# Patient Record
Sex: MALE | Race: WHITE | NOT HISPANIC OR LATINO | URBAN - METROPOLITAN AREA
[De-identification: names, ages, dates, MRNs, and addresses within clinical notes are randomized per-mention and may not be internally consistent; named-entity substitution may affect disease eponyms.]

---

## 2018-09-12 ENCOUNTER — EMERGENCY (EMERGENCY)
Facility: HOSPITAL | Age: 63
LOS: 1 days | Discharge: HOME | End: 2018-09-12
Attending: EMERGENCY MEDICINE
Payer: MEDICARE

## 2018-09-12 VITALS
WEIGHT: 179.9 LBS | HEART RATE: 110 BPM | RESPIRATION RATE: 18 BRPM | HEIGHT: 66 IN | SYSTOLIC BLOOD PRESSURE: 149 MMHG | OXYGEN SATURATION: 96 % | DIASTOLIC BLOOD PRESSURE: 81 MMHG | TEMPERATURE: 99 F

## 2018-09-12 LAB
ANION GAP SERPL CALC-SCNC: 14 MMOL/L — SIGNIFICANT CHANGE UP (ref 5–17)
BUN SERPL-MCNC: 18 MG/DL — SIGNIFICANT CHANGE UP (ref 8–20)
CALCIUM SERPL-MCNC: 9 MG/DL — SIGNIFICANT CHANGE UP (ref 8.6–10.2)
CHLORIDE SERPL-SCNC: 94 MMOL/L — LOW (ref 98–107)
CO2 SERPL-SCNC: 23 MMOL/L — SIGNIFICANT CHANGE UP (ref 22–29)
CREAT SERPL-MCNC: 0.88 MG/DL — SIGNIFICANT CHANGE UP (ref 0.5–1.3)
GLUCOSE SERPL-MCNC: 123 MG/DL — HIGH (ref 70–115)
HCT VFR BLD CALC: 41.9 % — LOW (ref 42–52)
HGB BLD-MCNC: 14.1 G/DL — SIGNIFICANT CHANGE UP (ref 14–18)
MCHC RBC-ENTMCNC: 31.4 PG — HIGH (ref 27–31)
MCHC RBC-ENTMCNC: 33.7 G/DL — SIGNIFICANT CHANGE UP (ref 32–36)
MCV RBC AUTO: 93.3 FL — SIGNIFICANT CHANGE UP (ref 80–94)
PLATELET # BLD AUTO: 393 K/UL — SIGNIFICANT CHANGE UP (ref 150–400)
POTASSIUM SERPL-MCNC: 4.4 MMOL/L — SIGNIFICANT CHANGE UP (ref 3.5–5.3)
POTASSIUM SERPL-SCNC: 4.4 MMOL/L — SIGNIFICANT CHANGE UP (ref 3.5–5.3)
RBC # BLD: 4.49 M/UL — LOW (ref 4.6–6.2)
RBC # FLD: 13 % — SIGNIFICANT CHANGE UP (ref 11–15.6)
SODIUM SERPL-SCNC: 131 MMOL/L — LOW (ref 135–145)
VALPROATE SERPL-MCNC: 26.4 UG/ML — LOW (ref 50–100)
WBC # BLD: 11.6 K/UL — HIGH (ref 4.8–10.8)
WBC # FLD AUTO: 11.6 K/UL — HIGH (ref 4.8–10.8)

## 2018-09-12 PROCEDURE — 99291 CRITICAL CARE FIRST HOUR: CPT

## 2018-09-12 RX ORDER — HALOPERIDOL DECANOATE 100 MG/ML
5 INJECTION INTRAMUSCULAR ONCE
Qty: 0 | Refills: 0 | Status: COMPLETED | OUTPATIENT
Start: 2018-09-12 | End: 2018-09-12

## 2018-09-12 RX ORDER — SODIUM CHLORIDE 9 MG/ML
3 INJECTION INTRAMUSCULAR; INTRAVENOUS; SUBCUTANEOUS ONCE
Qty: 0 | Refills: 0 | Status: COMPLETED | OUTPATIENT
Start: 2018-09-12 | End: 2018-09-12

## 2018-09-12 RX ADMIN — HALOPERIDOL DECANOATE 5 MILLIGRAM(S): 100 INJECTION INTRAMUSCULAR at 21:38

## 2018-09-12 RX ADMIN — SODIUM CHLORIDE 3 MILLILITER(S): 9 INJECTION INTRAMUSCULAR; INTRAVENOUS; SUBCUTANEOUS at 22:14

## 2018-09-12 RX ADMIN — Medication 2 MILLIGRAM(S): at 21:38
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
## 2018-09-12 NOTE — ED PROVIDER NOTE - CONSTITUTIONAL, MLM
normal... Well appearing, well nourished, awake, alert, oriented to person, place, time/situation and in no apparent distress. AGITATED EXTREME
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
## 2018-09-12 NOTE — ED PROVIDER NOTE - CRITICAL CARE INDICATION, MLM
patient was critically ill... Patient was critically ill with a high probability of imminent or life threatening deterioration. AGITATED PT WITH EMS AND PD STAT IV IV MED LABS MONITOR AND PULSE OX

## 2018-09-12 NOTE — ED PROVIDER NOTE - CRITICAL CARE PROVIDED
documentation/interpretation of diagnostic studies/consultation with other physicians/35 MINUTES/direct patient care (not related to procedure)
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
## 2018-09-12 NOTE — ED ADULT NURSE NOTE - INTERVENTIONS DEFINITIONS
Stretcher in lowest position, wheels locked, appropriate side rails in place/Non-slip footwear when patient is off stretcher/Physically safe environment: no spills, clutter or unnecessary equipment/Monitor for mental status changes and reorient to person, place, and time/Provide visual cue, wrist band, yellow gown, etc.

## 2018-09-12 NOTE — ED ADULT NURSE NOTE - OBJECTIVE STATEMENT
Pt found in yellow gown, sedated upon assessment.  Pt responds to painful stimuli.  Received phone call from facility saying they wanted pt tranported to [facility] for psych eval following aggressive behavior but swelling was noted to left ankle and EMS said pt would have to be cleared medically first.  Facility still wants psych eval.

## 2018-09-13 VITALS
RESPIRATION RATE: 18 BRPM | SYSTOLIC BLOOD PRESSURE: 130 MMHG | DIASTOLIC BLOOD PRESSURE: 78 MMHG | TEMPERATURE: 98 F | HEART RATE: 80 BPM | OXYGEN SATURATION: 97 %

## 2018-09-13 LAB
AMPHET UR-MCNC: NEGATIVE — SIGNIFICANT CHANGE UP
BARBITURATES UR SCN-MCNC: NEGATIVE — SIGNIFICANT CHANGE UP
BENZODIAZ UR-MCNC: NEGATIVE — SIGNIFICANT CHANGE UP
COCAINE METAB.OTHER UR-MCNC: NEGATIVE — SIGNIFICANT CHANGE UP
METHADONE UR-MCNC: NEGATIVE — SIGNIFICANT CHANGE UP
OPIATES UR-MCNC: NEGATIVE — SIGNIFICANT CHANGE UP
PCP SPEC-MCNC: SIGNIFICANT CHANGE UP
PCP UR-MCNC: NEGATIVE — SIGNIFICANT CHANGE UP
THC UR QL: NEGATIVE — SIGNIFICANT CHANGE UP

## 2018-09-13 PROCEDURE — 85027 COMPLETE CBC AUTOMATED: CPT

## 2018-09-13 PROCEDURE — 80164 ASSAY DIPROPYLACETIC ACD TOT: CPT

## 2018-09-13 PROCEDURE — 80048 BASIC METABOLIC PNL TOTAL CA: CPT

## 2018-09-13 PROCEDURE — 70450 CT HEAD/BRAIN W/O DYE: CPT | Mod: 26

## 2018-09-13 PROCEDURE — 96374 THER/PROPH/DIAG INJ IV PUSH: CPT

## 2018-09-13 PROCEDURE — 99285 EMERGENCY DEPT VISIT HI MDM: CPT | Mod: 25

## 2018-09-13 PROCEDURE — 36415 COLL VENOUS BLD VENIPUNCTURE: CPT

## 2018-09-13 PROCEDURE — 96375 TX/PRO/DX INJ NEW DRUG ADDON: CPT

## 2018-09-13 PROCEDURE — 70450 CT HEAD/BRAIN W/O DYE: CPT

## 2018-09-13 PROCEDURE — 80307 DRUG TEST PRSMV CHEM ANLYZR: CPT

## 2018-09-13 RX ORDER — SODIUM CHLORIDE 9 MG/ML
999 INJECTION INTRAMUSCULAR; INTRAVENOUS; SUBCUTANEOUS ONCE
Qty: 0 | Refills: 0 | Status: COMPLETED | OUTPATIENT
Start: 2018-09-13 | End: 2018-09-13

## 2018-09-13 RX ADMIN — SODIUM CHLORIDE 999 MILLILITER(S): 9 INJECTION INTRAMUSCULAR; INTRAVENOUS; SUBCUTANEOUS at 05:31

## 2018-09-13 NOTE — ED ADULT NURSE REASSESSMENT NOTE - NS ED NURSE REASSESS COMMENT FT1
Pt reassessed.  Pt lethargic from sedation.  currently responds to verbal stimuli.  Denies physical complaints will continue to monitor.

## 2019-04-04 ENCOUNTER — EMERGENCY (EMERGENCY)
Facility: HOSPITAL | Age: 64
LOS: 1 days | Discharge: OTHER INSTITUTION | End: 2019-04-04
Attending: EMERGENCY MEDICINE
Payer: MEDICARE

## 2019-04-04 VITALS
SYSTOLIC BLOOD PRESSURE: 147 MMHG | DIASTOLIC BLOOD PRESSURE: 86 MMHG | OXYGEN SATURATION: 98 % | RESPIRATION RATE: 18 BRPM | WEIGHT: 145.06 LBS | HEIGHT: 64 IN | HEART RATE: 85 BPM | TEMPERATURE: 99 F

## 2019-04-04 PROCEDURE — 99284 EMERGENCY DEPT VISIT MOD MDM: CPT | Mod: 25

## 2019-04-04 RX ORDER — OLANZAPINE 15 MG/1
10 TABLET, FILM COATED ORAL ONCE
Qty: 0 | Refills: 0 | Status: COMPLETED | OUTPATIENT
Start: 2019-04-04 | End: 2019-04-04

## 2019-04-04 RX ADMIN — OLANZAPINE 10 MILLIGRAM(S): 15 TABLET, FILM COATED ORAL at 22:23

## 2019-04-04 RX ADMIN — Medication 2 MILLIGRAM(S): at 23:11
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
## 2019-04-04 NOTE — ED ADULT NURSE NOTE - NSIMPLEMENTINTERV_GEN_ALL_ED
Implemented All Fall with Harm Risk Interventions:  Wheeler to call system. Call bell, personal items and telephone within reach. Instruct patient to call for assistance. Room bathroom lighting operational. Non-slip footwear when patient is off stretcher. Physically safe environment: no spills, clutter or unnecessary equipment. Stretcher in lowest position, wheels locked, appropriate side rails in place. Provide visual cue, wrist band, yellow gown, etc. Monitor gait and stability. Monitor for mental status changes and reorient to person, place, and time. Review medications for side effects contributing to fall risk. Reinforce activity limits and safety measures with patient and family. Provide visual clues: red socks.

## 2019-04-04 NOTE — ED PROVIDER NOTE - PHYSICAL EXAMINATION
Constitutional : Appears comfortably, talking in full sentences  Head :NC AT , no swelling  Eyes :eomi, no swelling  Mouth :mm moist,  Neck : supple, trachea in midline  Chest :Sky air entry, symm chest expansion, no distress  Heart :S1 S2 distant  Abdomen :abd soft, non tender  Musc/Skel :ext no swelling, no deformity, no spine tenderness, distal pulses present  Neuro  :AAO 3 no focal deficits Constitutional : Appears comfortably, talking in full sentences  Head :NC AT , no swelling  Eyes :eomi, no swelling  Mouth :mm moist,  Neck : supple, trachea in midline  Chest :Sky air entry, symm chest expansion, no distress  Heart :S1 S2 distant  Abdomen :abd soft, non tender  Musc/Skel :ext no swelling, no deformity, no spine tenderness, distal pulses present  Neuro  :AAO 3 no focal deficits    AAO*3, follows commands  motor sky upper and lower ext 5/5  sensory symm and intact  CN 2-12 grossly intact  no ataxia, no nystagmus  finger to nose, symm sky, no pronator drift Constitutional : Appears agitated, is talking in tangental, racing thoughts, difficulty concentrating on conversation,   Head :NC AT , no swelling  Eyes :eomi, no swelling  Mouth :mm moist,  Neck : supple, trachea in midline  Chest :Sky air entry, symm chest expansion, no distress  Heart :S1 S2 distant  Abdomen :abd soft, non tender  Musc/Skel :ext no swelling, no deformity, no spine tenderness, distal pulses present  LLE is larger than right (changes appear chronic)  Neuro  :AAO x 2 no focal deficits, cranial nerves grossly intact, Difficult to assess patient  Level of Consciousness: alert, does not follow commands, CONFUSED  Mood: MANIC, ANXIOUS,   Affect: EXAGGERATED,   Speech: LOUD. PRESSURED, , RAPID,   Risk of Harm: no verbalization of thoughts of harm, no EVIDENCE OF CUTTING, no VERBALIZING WISH TO HARM OTHERS,   Psychiatric Memory: short term memory questionable, SHORT TERM DEFICIT, LONG TERM intact   Behavior: has PSYCHOMOTOR AGITATION, decreased EYE CONTACT  Abnormal Movements: TREMOR, DYSTONIA,   Perception: unable to access for HALLUCINATIONS, DEREALIZATION, DEPERSONALIZATION, TIME DISTORTION

## 2019-04-04 NOTE — ED PROVIDER NOTE - UNABLE TO OBTAIN
HPI limited due to AMS Severe Illness/Injury ROS limited due to AMS HPI limited due to AMS and agitation ROS limited due to AMS and agitation
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
## 2019-04-04 NOTE — ED PROVIDER NOTE - OBJECTIVE STATEMENT
hx: EMS    62 y/o M BIB EMS, presenting with AMS. Patient has not been taking his medication and he is considered for DM according to EMS and out-patient papers. He comes from an out-patient center of [place]. He is talking gibberish and getting agitated when asked about his medication. Pt showed increased agitation at care facility and the crisis hotline was contacted. Patient complains that they took his personal property. hx: EMS    62 y/o M BIB EMS, presenting with AMS. Patient has not been taking his medication and he is considered for DM according to EMS and out-patient papers. He comes from an out-patient center of [place]. He is talking gibberish and getting agitated when asked about his medication. Pt showed increased agitation at care facility and the crisis hotline was contacted. Patient complains that they took his personal property. appears paranoid

## 2019-04-04 NOTE — ED ADULT NURSE NOTE - OBJECTIVE STATEMENT
received pt in yellow gown with belongings secured in . 62yo male sent in from out patient [facility] for not taking meds and aggressive behaviors. pt refusing any rn assessment, medicated as rxd for pt and staff safety. dr gueavra called to bedside for eval upon arrival. pt confused, mumbling, aggressive and un-redirectable. resp spontaneous, even and unlabored

## 2019-04-04 NOTE — ED PROVIDER NOTE - CLINICAL SUMMARY MEDICAL DECISION MAKING FREE TEXT BOX
64 y/o M BIB EMS, with hx of schizophrenia, brought in from outpatient psych facility brought in for acute agitation.  Plan to check labs, EKG, sedation and psych consult needed.

## 2019-04-05 ENCOUNTER — INPATIENT (INPATIENT)
Facility: HOSPITAL | Age: 64
LOS: 75 days | Discharge: PSYCHIATRIC OR SUBSTANCE USE FACILITY | DRG: 885 | End: 2019-06-20
Attending: PSYCHIATRY & NEUROLOGY | Admitting: PSYCHIATRY & NEUROLOGY
Payer: MEDICARE

## 2019-04-05 VITALS
OXYGEN SATURATION: 98 % | HEART RATE: 83 BPM | DIASTOLIC BLOOD PRESSURE: 85 MMHG | TEMPERATURE: 98 F | SYSTOLIC BLOOD PRESSURE: 130 MMHG | RESPIRATION RATE: 16 BRPM

## 2019-04-05 DIAGNOSIS — F20.9 SCHIZOPHRENIA, UNSPECIFIED: ICD-10-CM

## 2019-04-05 LAB
ALBUMIN SERPL ELPH-MCNC: 3.9 G/DL — SIGNIFICANT CHANGE UP (ref 3.3–5.2)
ALP SERPL-CCNC: 82 U/L — SIGNIFICANT CHANGE UP (ref 40–120)
ALT FLD-CCNC: 35 U/L — SIGNIFICANT CHANGE UP
ANION GAP SERPL CALC-SCNC: 12 MMOL/L — SIGNIFICANT CHANGE UP (ref 5–17)
AST SERPL-CCNC: 30 U/L — SIGNIFICANT CHANGE UP
BASOPHILS # BLD AUTO: 0 K/UL — SIGNIFICANT CHANGE UP (ref 0–0.2)
BASOPHILS NFR BLD AUTO: 0.4 % — SIGNIFICANT CHANGE UP (ref 0–2)
BILIRUB SERPL-MCNC: 0.4 MG/DL — SIGNIFICANT CHANGE UP (ref 0.4–2)
BUN SERPL-MCNC: 15 MG/DL — SIGNIFICANT CHANGE UP (ref 8–20)
CALCIUM SERPL-MCNC: 9.3 MG/DL — SIGNIFICANT CHANGE UP (ref 8.6–10.2)
CHLORIDE SERPL-SCNC: 100 MMOL/L — SIGNIFICANT CHANGE UP (ref 98–107)
CO2 SERPL-SCNC: 22 MMOL/L — SIGNIFICANT CHANGE UP (ref 22–29)
CREAT SERPL-MCNC: 0.46 MG/DL — LOW (ref 0.5–1.3)
EOSINOPHIL # BLD AUTO: 0.1 K/UL — SIGNIFICANT CHANGE UP (ref 0–0.5)
EOSINOPHIL NFR BLD AUTO: 0.4 % — SIGNIFICANT CHANGE UP (ref 0–6)
ETHANOL SERPL-MCNC: <10 MG/DL — SIGNIFICANT CHANGE UP
GLUCOSE SERPL-MCNC: 128 MG/DL — HIGH (ref 70–115)
HCT VFR BLD CALC: 46.1 % — SIGNIFICANT CHANGE UP (ref 42–52)
HGB BLD-MCNC: 15.7 G/DL — SIGNIFICANT CHANGE UP (ref 14–18)
LYMPHOCYTES # BLD AUTO: 1.2 K/UL — SIGNIFICANT CHANGE UP (ref 1–4.8)
LYMPHOCYTES # BLD AUTO: 8.8 % — LOW (ref 20–55)
MAGNESIUM SERPL-MCNC: 1.8 MG/DL — SIGNIFICANT CHANGE UP (ref 1.6–2.6)
MCHC RBC-ENTMCNC: 31.7 PG — HIGH (ref 27–31)
MCHC RBC-ENTMCNC: 34.1 G/DL — SIGNIFICANT CHANGE UP (ref 32–36)
MCV RBC AUTO: 93.1 FL — SIGNIFICANT CHANGE UP (ref 80–94)
MONOCYTES # BLD AUTO: 0.7 K/UL — SIGNIFICANT CHANGE UP (ref 0–0.8)
MONOCYTES NFR BLD AUTO: 5 % — SIGNIFICANT CHANGE UP (ref 3–10)
NEUTROPHILS # BLD AUTO: 11.7 K/UL — HIGH (ref 1.8–8)
NEUTROPHILS NFR BLD AUTO: 85.2 % — HIGH (ref 37–73)
PLAT MORPH BLD: NORMAL — SIGNIFICANT CHANGE UP
PLATELET # BLD AUTO: 263 K/UL — SIGNIFICANT CHANGE UP (ref 150–400)
POTASSIUM SERPL-MCNC: 4.6 MMOL/L — SIGNIFICANT CHANGE UP (ref 3.5–5.3)
POTASSIUM SERPL-SCNC: 4.6 MMOL/L — SIGNIFICANT CHANGE UP (ref 3.5–5.3)
PROT SERPL-MCNC: 6.4 G/DL — LOW (ref 6.6–8.7)
RBC # BLD: 4.95 M/UL — SIGNIFICANT CHANGE UP (ref 4.6–6.2)
RBC # FLD: 12.7 % — SIGNIFICANT CHANGE UP (ref 11–15.6)
RBC BLD AUTO: NORMAL — SIGNIFICANT CHANGE UP
SODIUM SERPL-SCNC: 134 MMOL/L — LOW (ref 135–145)
TSH SERPL-MCNC: 0.8 UIU/ML — SIGNIFICANT CHANGE UP (ref 0.27–4.2)
WBC # BLD: 13.7 K/UL — HIGH (ref 4.8–10.8)
WBC # FLD AUTO: 13.7 K/UL — HIGH (ref 4.8–10.8)

## 2019-04-05 PROCEDURE — 99233 SBSQ HOSP IP/OBS HIGH 50: CPT

## 2019-04-05 PROCEDURE — 96374 THER/PROPH/DIAG INJ IV PUSH: CPT

## 2019-04-05 PROCEDURE — 82962 GLUCOSE BLOOD TEST: CPT

## 2019-04-05 PROCEDURE — 80307 DRUG TEST PRSMV CHEM ANLYZR: CPT

## 2019-04-05 PROCEDURE — 99284 EMERGENCY DEPT VISIT MOD MDM: CPT | Mod: 25

## 2019-04-05 PROCEDURE — 96372 THER/PROPH/DIAG INJ SC/IM: CPT | Mod: XU

## 2019-04-05 PROCEDURE — 93010 ELECTROCARDIOGRAM REPORT: CPT

## 2019-04-05 PROCEDURE — 96376 TX/PRO/DX INJ SAME DRUG ADON: CPT

## 2019-04-05 PROCEDURE — 96361 HYDRATE IV INFUSION ADD-ON: CPT

## 2019-04-05 PROCEDURE — 36415 COLL VENOUS BLD VENIPUNCTURE: CPT

## 2019-04-05 PROCEDURE — 83735 ASSAY OF MAGNESIUM: CPT

## 2019-04-05 PROCEDURE — 93005 ELECTROCARDIOGRAM TRACING: CPT

## 2019-04-05 PROCEDURE — 85027 COMPLETE CBC AUTOMATED: CPT

## 2019-04-05 PROCEDURE — 90792 PSYCH DIAG EVAL W/MED SRVCS: CPT | Mod: GT

## 2019-04-05 PROCEDURE — 80053 COMPREHEN METABOLIC PANEL: CPT

## 2019-04-05 PROCEDURE — 84443 ASSAY THYROID STIM HORMONE: CPT

## 2019-04-05 RX ORDER — OLANZAPINE 15 MG/1
5 TABLET, FILM COATED ORAL EVERY 12 HOURS
Qty: 0 | Refills: 0 | Status: DISCONTINUED | OUTPATIENT
Start: 2019-04-05 | End: 2019-04-09

## 2019-04-05 RX ORDER — HALOPERIDOL DECANOATE 100 MG/ML
5 INJECTION INTRAMUSCULAR EVERY 6 HOURS
Qty: 0 | Refills: 0 | Status: COMPLETED | OUTPATIENT
Start: 2019-04-05 | End: 2020-03-03

## 2019-04-05 RX ORDER — BENZTROPINE MESYLATE 1 MG
0.5 TABLET ORAL
Qty: 0 | Refills: 0 | Status: DISCONTINUED | OUTPATIENT
Start: 2019-04-05 | End: 2019-04-06

## 2019-04-05 RX ORDER — QUETIAPINE FUMARATE 200 MG/1
50 TABLET, FILM COATED ORAL AT BEDTIME
Qty: 0 | Refills: 0 | Status: DISCONTINUED | OUTPATIENT
Start: 2019-04-05 | End: 2019-04-06

## 2019-04-05 RX ORDER — HALOPERIDOL DECANOATE 100 MG/ML
5 INJECTION INTRAMUSCULAR EVERY 6 HOURS
Qty: 0 | Refills: 0 | Status: DISCONTINUED | OUTPATIENT
Start: 2019-04-05 | End: 2019-06-20

## 2019-04-05 RX ORDER — SODIUM CHLORIDE 9 MG/ML
999 INJECTION INTRAMUSCULAR; INTRAVENOUS; SUBCUTANEOUS ONCE
Qty: 0 | Refills: 0 | Status: COMPLETED | OUTPATIENT
Start: 2019-04-05 | End: 2019-04-05

## 2019-04-05 RX ADMIN — QUETIAPINE FUMARATE 50 MILLIGRAM(S): 200 TABLET, FILM COATED ORAL at 21:05

## 2019-04-05 RX ADMIN — SODIUM CHLORIDE 249.75 MILLILITER(S): 9 INJECTION INTRAMUSCULAR; INTRAVENOUS; SUBCUTANEOUS at 07:53

## 2019-04-05 RX ADMIN — Medication 2 MILLIGRAM(S): at 06:45

## 2019-04-05 RX ADMIN — Medication 2 MILLIGRAM(S): at 21:05

## 2019-04-05 RX ADMIN — Medication 0.5 MILLIGRAM(S): at 21:05

## 2019-04-05 RX ADMIN — Medication 2 MILLIGRAM(S): at 10:02

## 2019-04-05 RX ADMIN — Medication 2 MILLIGRAM(S): at 21:25

## 2019-04-05 RX ADMIN — SODIUM CHLORIDE 999 MILLILITER(S): 9 INJECTION INTRAMUSCULAR; INTRAVENOUS; SUBCUTANEOUS at 08:30

## 2019-04-05 RX ADMIN — HALOPERIDOL DECANOATE 5 MILLIGRAM(S): 100 INJECTION INTRAMUSCULAR at 21:05

## 2019-04-05 NOTE — PROGRESS NOTE BEHAVIORAL HEALTH - NSBHADMITIPBHPROVFT_PSY_A_CORE
Dr. Moreira called [facility]  forensic ACT team ([phone]) and they report patient is on the forensic ACT team due to the ACT team being full, and the forensic ACT team taking some of the ACT team's patients.  They deny patient has a forensic hx.  Dr. Moreira spoke to Paula Dr. Moreira spoke with [facility] forensic ACT team

## 2019-04-05 NOTE — ED BEHAVIORAL HEALTH ASSESSMENT NOTE - RISK ASSESSMENT
Acute Suicide Risk  (  ) High   (   ) Moderate   (  ) Low   (x  ) Unable to determine   Rationale __patient does not participate in assessment. Some concern due to report of agitation but currently somnolent.________    Elevated Chronic Risk   ( x ) Yes ___________  Details ____hx of schizophrenia_______  (  ) No   ___________    Safety Plan   Details: ____hold in ED.

## 2019-04-05 NOTE — ED ADULT NURSE REASSESSMENT NOTE - NS ED NURSE REASSESS COMMENT FT1
Pt continues on  1 to 1 due to behaviour issues, acting out, calling out, aggressive outbursts, redirection did not work, code gray was called and medication was adm as per order. Pt constantly observed safety measures in place.

## 2019-04-05 NOTE — ED ADULT NURSE REASSESSMENT NOTE - NS ED NURSE REASSESS COMMENT FT1
Pt wondering shields agitated unable to reorient  assisted to chair by md guevara, rn attempted to place patient in wheel chair sudden unsteady gait noted  and outburst of agitation swinging at staff. pt placed on 1:1 for safety security called to bedside

## 2019-04-05 NOTE — ED BEHAVIORAL HEALTH ASSESSMENT NOTE - OTHER
ED staff and Collateral residence staff peers older than stated age tele somnolent patient unable to cooperate unable to comment

## 2019-04-05 NOTE — CHART NOTE - NSCHARTNOTEFT_GEN_A_CORE
Called for code grey as patient is agitated, reported to be violent & spitting over staff, seen at the bedside, lying in bed with security staff around, just received 2 mg Ativan PO & 5 mg of Haldol PO. Order for Ativan 2 mg IM Q 6h PRN for agitation put earlier by psychiatrist was activated, and patient was given 2 mg Ativan IM X1 dose with good response.
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
## 2019-04-05 NOTE — PROGRESS NOTE BEHAVIORAL HEALTH - NSBHADDHXMEDFT_PSY_A_CORE
patient has HTN     Is on metoprolol and lisinopril    Dr. Roberson aware and will follow up with patient.

## 2019-04-05 NOTE — PROGRESS NOTE BEHAVIORAL HEALTH - NSBHFUPINTERVALHXFT_PSY_A_CORE
Met with and evaluated patient. Chart reviewed and case discussed with staff.  Patient is mumbling, and is disorganized with very unsteady gait.  He is irritable and easily frustrated.  His speech is disorganized and he is unable to say whether he would be harmful to self or to others. Constant Observation begun for safety of patient and others.   Unable to assess for AH or VH, but patient has poor eye contact and is flagrantly psychotic. Patient is able to make his basic needs known, eg told staff he needed to walk to the bathroom.  Discussed case briefly with Dr. Roberson who reports he will see patient 4/6

## 2019-04-05 NOTE — PROGRESS NOTE BEHAVIORAL HEALTH - OTHER
older than stated age somewhat cooperative with staff in tenuous control very slowed and unsteady, staff assists with ambulation mumbling poor dentition declines to answer does not reply when asked about suicide and harm to others difficult to assess unable to comment unable to assess
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
## 2019-04-05 NOTE — ED BEHAVIORAL HEALTH NOTE - BEHAVIORAL HEALTH NOTE
Social Work Note: as per MD patient would benefit from inpatient psychiatric treatment. Clinicals were sent to [facility] for review and patient has been accepted at this time. Confirmed with [facility] and accepting MD Dr. Moreira. Nurse to nurse report completed with nurse at [facility]. [facility] called for transport. Patient does not require insurance authorization as he has medicare/medicaid.

## 2019-04-05 NOTE — ED BEHAVIORAL HEALTH ASSESSMENT NOTE - SUMMARY
Patient is a 64 y/o M with reported hx of schizophrenia sent in from [facility] for report of agitation. Patient is a 62 y/o M with reported hx of schizophrenia sent in from [facility] for report of agitation.    presented with agitation per chart, was medicated IM. now unable to participate in Eval. requires reassessment when able to maintain alertness.

## 2019-04-05 NOTE — ED ADULT NURSE REASSESSMENT NOTE - NS ED NURSE REASSESS COMMENT FT1
pt increased agitation, attempting to get OOB with unsteady gait. multiple attempts to redirect. dr guevara called to bedside. 1:1 initiated for pts safety as pt awaiting [facility] eval.

## 2019-04-05 NOTE — PROGRESS NOTE BEHAVIORAL HEALTH - NSBHFUPVIOLRISKFACT_PSY_A_CORE
Lack of insight into violence risk/need for treatment/Irritability/Affective dysregulation/Impulsivity/Noncompliance with treatment

## 2019-04-05 NOTE — ED BEHAVIORAL HEALTH NOTE - BEHAVIORAL HEALTH NOTE
Consult requested by EM Attending Dr. Moreno at 0:38 Telepsychiatry attempted to consult at 1:15 and again at 1:38 but unable to initiate due to inability to contact ED staff. Also, patient was not in a private room with a DX device. ED staff educated to notify Telepsychiatry once private room available.
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
## 2019-04-05 NOTE — ED BEHAVIORAL HEALTH ASSESSMENT NOTE - PSYCHIATRIC ISSUES AND PLAN (INCLUDE STANDING AND PRN MEDICATION)
was already medicated with IM antispychotic by ED, would not add his home seroquel at this time as he is quite sedated and 10mg zyprexa is a greater amount of antipsychotic than his home Rx of 100mg seroquel.

## 2019-04-06 LAB
CHOLEST SERPL-MCNC: 188 MG/DL — SIGNIFICANT CHANGE UP (ref 10–199)
HBA1C BLD-MCNC: 5.7 % — HIGH (ref 4–5.6)
HDLC SERPL-MCNC: 40 MG/DL — SIGNIFICANT CHANGE UP
LIPID PNL WITH DIRECT LDL SERPL: 122 MG/DL — HIGH
TOTAL CHOLESTEROL/HDL RATIO MEASUREMENT: 4.7 RATIO — SIGNIFICANT CHANGE UP (ref 3.4–9.6)
TRIGL SERPL-MCNC: 131 MG/DL — SIGNIFICANT CHANGE UP (ref 10–149)

## 2019-04-06 PROCEDURE — 99231 SBSQ HOSP IP/OBS SF/LOW 25: CPT

## 2019-04-06 RX ORDER — HALOPERIDOL DECANOATE 100 MG/ML
1 INJECTION INTRAMUSCULAR
Qty: 0 | Refills: 0 | Status: DISCONTINUED | OUTPATIENT
Start: 2019-04-06 | End: 2019-04-07

## 2019-04-06 RX ADMIN — HALOPERIDOL DECANOATE 5 MILLIGRAM(S): 100 INJECTION INTRAMUSCULAR at 09:48

## 2019-04-06 RX ADMIN — HALOPERIDOL DECANOATE 1 MILLIGRAM(S): 100 INJECTION INTRAMUSCULAR at 20:52

## 2019-04-06 RX ADMIN — Medication 0.5 MILLIGRAM(S): at 08:55

## 2019-04-06 RX ADMIN — Medication 2 MILLIGRAM(S): at 09:48

## 2019-04-06 NOTE — PROGRESS NOTE BEHAVIORAL HEALTH - NSBHFUPINTERVALHXFT_PSY_A_CORE
Met with and evaluated patient. Chart reviewed and case discussed with staff.  Patient is mumbling, and is disorganized with very unsteady gait.  In bed on 1:1 for safety and disorganization. As per staff/Nursing, he took his breakfast, in the day room, still incoherent, delirious, and mumbling to self. Cogentin discontinued. Haldol low dose started.

## 2019-04-07 LAB — T PALLIDUM AB TITR SER: NEGATIVE — SIGNIFICANT CHANGE UP

## 2019-04-07 PROCEDURE — 99231 SBSQ HOSP IP/OBS SF/LOW 25: CPT

## 2019-04-07 RX ORDER — RISPERIDONE 4 MG/1
0.5 TABLET ORAL
Qty: 0 | Refills: 0 | Status: DISCONTINUED | OUTPATIENT
Start: 2019-04-07 | End: 2019-04-08

## 2019-04-07 RX ADMIN — RISPERIDONE 0.5 MILLIGRAM(S): 4 TABLET ORAL at 21:40

## 2019-04-07 RX ADMIN — Medication 2 MILLIGRAM(S): at 08:50

## 2019-04-07 RX ADMIN — Medication 2 MILLIGRAM(S): at 21:40

## 2019-04-07 RX ADMIN — HALOPERIDOL DECANOATE 5 MILLIGRAM(S): 100 INJECTION INTRAMUSCULAR at 21:40

## 2019-04-07 NOTE — PHYSICAL THERAPY INITIAL EVALUATION ADULT - LEVEL OF INDEPENDENCE: GAIT, REHAB EVAL
unable to perform/Pt. refused to ambulate. Nursing staff says pt. ambulates independently occasionally unstable.

## 2019-04-07 NOTE — PHYSICAL THERAPY INITIAL EVALUATION ADULT - GENERAL OBSERVATIONS, REHAB EVAL
Pt. received in community room asleep on chair. PT unable to understand what pt. was saying. RN reports pt. lives in a group home prior to admission. Did not use any assistive devices prior to admission.

## 2019-04-07 NOTE — PROGRESS NOTE BEHAVIORAL HEALTH - NSBHFUPINTERVALHXFT_PSY_A_CORE
Met with and evaluated patient. Chart reviewed and case discussed with staff.  Patient remains disorganized, less mumbling today AM, refused haldol 1 mg as he does not take. Switched to Risperdal 0.5 mg BID. To f/U in AM tomorrow.

## 2019-04-08 LAB
ANION GAP SERPL CALC-SCNC: 10 MMOL/L — SIGNIFICANT CHANGE UP (ref 5–17)
BUN SERPL-MCNC: 21 MG/DL — SIGNIFICANT CHANGE UP (ref 7–23)
CALCIUM SERPL-MCNC: 9.1 MG/DL — SIGNIFICANT CHANGE UP (ref 8.4–10.5)
CHLORIDE SERPL-SCNC: 104 MMOL/L — SIGNIFICANT CHANGE UP (ref 96–108)
CO2 SERPL-SCNC: 25 MMOL/L — SIGNIFICANT CHANGE UP (ref 22–31)
CREAT SERPL-MCNC: 0.65 MG/DL — SIGNIFICANT CHANGE UP (ref 0.5–1.3)
GLUCOSE SERPL-MCNC: 111 MG/DL — HIGH (ref 70–99)
HCT VFR BLD CALC: 48.1 % — SIGNIFICANT CHANGE UP (ref 39–50)
HGB BLD-MCNC: 16.1 G/DL — SIGNIFICANT CHANGE UP (ref 13–17)
MCHC RBC-ENTMCNC: 31.6 PG — SIGNIFICANT CHANGE UP (ref 27–34)
MCHC RBC-ENTMCNC: 33.5 GM/DL — SIGNIFICANT CHANGE UP (ref 32–36)
MCV RBC AUTO: 94.5 FL — SIGNIFICANT CHANGE UP (ref 80–100)
NRBC # BLD: 0 /100 WBCS — SIGNIFICANT CHANGE UP (ref 0–0)
PLATELET # BLD AUTO: 269 K/UL — SIGNIFICANT CHANGE UP (ref 150–400)
POTASSIUM SERPL-MCNC: 4.5 MMOL/L — SIGNIFICANT CHANGE UP (ref 3.5–5.3)
POTASSIUM SERPL-SCNC: 4.5 MMOL/L — SIGNIFICANT CHANGE UP (ref 3.5–5.3)
RBC # BLD: 5.09 M/UL — SIGNIFICANT CHANGE UP (ref 4.2–5.8)
RBC # FLD: 11.7 % — SIGNIFICANT CHANGE UP (ref 10.3–14.5)
SODIUM SERPL-SCNC: 139 MMOL/L — SIGNIFICANT CHANGE UP (ref 135–145)
WBC # BLD: 8.06 K/UL — SIGNIFICANT CHANGE UP (ref 3.8–10.5)
WBC # FLD AUTO: 8.06 K/UL — SIGNIFICANT CHANGE UP (ref 3.8–10.5)

## 2019-04-08 PROCEDURE — 99232 SBSQ HOSP IP/OBS MODERATE 35: CPT

## 2019-04-08 RX ORDER — RISPERIDONE 4 MG/1
1 TABLET ORAL
Qty: 0 | Refills: 0 | Status: DISCONTINUED | OUTPATIENT
Start: 2019-04-08 | End: 2019-04-12

## 2019-04-08 RX ADMIN — Medication 2 MILLIGRAM(S): at 09:55

## 2019-04-08 RX ADMIN — RISPERIDONE 0.5 MILLIGRAM(S): 4 TABLET ORAL at 09:07

## 2019-04-08 RX ADMIN — RISPERIDONE 1 MILLIGRAM(S): 4 TABLET ORAL at 22:24

## 2019-04-08 NOTE — PROGRESS NOTE BEHAVIORAL HEALTH - OTHER
older than stated age somewhat cooperative with staff, uncooperative at times in tenuous control very slowed and unsteady, staff assists with ambulation mumbling,  but also speaking loudly  at times poor dentition "fine" neutral, gets angry at times more organized, minimal disorganization denies any AH or VH

## 2019-04-08 NOTE — PROGRESS NOTE BEHAVIORAL HEALTH - NSBHFUPINTERVALHXFT_PSY_A_CORE
Met with and evaluated patient. Chart reviewed and case discussed in tx team meeting.   Patient remains disorganized, less mumbling today, speaking in a loud voice at times.  Patient had arrived at the hospital with dirty pants, and the pants have a broken zipper which patient reports he wants fixed.  Staff got pants for patient which were clean and neat, patient threw pants at the staff and stated he only wants his pants he is wearing.  Patient is angry and paranoid.  Patient is masturbating in his room at times. Denies any SI or HI.  Denies any allergy to Haldol. No Rx SE  or sx TD/EPS are noted or reported.  Increase Risperdal to 1mg bid.  Staff continues to try to obtain urine specimen, but patient is resistant to cooperating with this.  Refused to cooperate with PT consult despite staff encouragement

## 2019-04-09 PROCEDURE — 99232 SBSQ HOSP IP/OBS MODERATE 35: CPT

## 2019-04-09 RX ADMIN — RISPERIDONE 1 MILLIGRAM(S): 4 TABLET ORAL at 20:30

## 2019-04-09 RX ADMIN — RISPERIDONE 1 MILLIGRAM(S): 4 TABLET ORAL at 09:16

## 2019-04-09 NOTE — PROGRESS NOTE BEHAVIORAL HEALTH - OTHER
older than stated age poor to limited more cooperative with staff, uncooperative at times in tenuous control more steady, staff assists with ambulation at times poor dentition "ok" neutral more organized, minimal disorganization denies any AH or VH

## 2019-04-09 NOTE — PROGRESS NOTE BEHAVIORAL HEALTH - NSBHFUPINTERVALHXFT_PSY_A_CORE
Met with and evaluated patient. Chart reviewed and case discussed in tx team meeting.  No significant interval events are reported.   Patient is less disorganized,  speaking in a normal voice at times. .  Patient is less paranoid. , but continues delusional Patient discussed Bj Rock's heart surgery, which he saw on the news, then reports that Bj Rock killed Olaf Wall due to an argument. Improved behavioral control today, and less irritable.  Denies any SI or HI.  Again denies  any allergy to Haldol. No Rx SE  or sx TD/EPS are noted or reported.  tolerating increase in Risperdal well.  Increase to 1mg qam and 2mg hs.

## 2019-04-10 PROCEDURE — 99232 SBSQ HOSP IP/OBS MODERATE 35: CPT

## 2019-04-10 RX ORDER — METOPROLOL TARTRATE 50 MG
25 TABLET ORAL DAILY
Qty: 0 | Refills: 0 | Status: DISCONTINUED | OUTPATIENT
Start: 2019-04-10 | End: 2019-05-11

## 2019-04-10 RX ORDER — PALIPERIDONE 1.5 MG/1
234 TABLET, EXTENDED RELEASE ORAL ONCE
Qty: 0 | Refills: 0 | Status: COMPLETED | OUTPATIENT
Start: 2019-04-11 | End: 2019-04-11

## 2019-04-10 RX ORDER — PALIPERIDONE 1.5 MG/1
156 TABLET, EXTENDED RELEASE ORAL ONCE
Qty: 0 | Refills: 0 | Status: COMPLETED | OUTPATIENT
Start: 2019-04-16 | End: 2019-04-16

## 2019-04-10 RX ADMIN — Medication 25 MILLIGRAM(S): at 13:43

## 2019-04-10 RX ADMIN — RISPERIDONE 1 MILLIGRAM(S): 4 TABLET ORAL at 09:14

## 2019-04-10 RX ADMIN — HALOPERIDOL DECANOATE 5 MILLIGRAM(S): 100 INJECTION INTRAMUSCULAR at 23:00

## 2019-04-10 RX ADMIN — RISPERIDONE 1 MILLIGRAM(S): 4 TABLET ORAL at 21:10

## 2019-04-10 RX ADMIN — Medication 2 MILLIGRAM(S): at 23:22

## 2019-04-10 NOTE — PROGRESS NOTE BEHAVIORAL HEALTH - NSBHFUPINTERVALHXFT_PSY_A_CORE
Met with and evaluated patient. Chart reviewed and case discussed in tx team meeting.  No significant interval events are reported except that patient has been in good behavioral control, and constant obs has been DC.  Patient placed on Enhanced Supervision.   Patient is less disorganized,  speaking in a normal voice at times. .. Improved behavioral control today, and less irritable.  Denies any SI or HI.  Again denies  any allergy to Haldol. No Rx SE  or sx TD/EPS are noted or reported.  tolerating increase in Risperdal well.  Increase to 1mg qam and 2mg hs.  To begin Invega Sustenna  to maintain Rx compliance after DC.

## 2019-04-10 NOTE — PROGRESS NOTE BEHAVIORAL HEALTH - OTHER
older than stated age more cooperative with staff, improved response to direction/limits in fair control poor dentition "fine" more organized, minimal disorganization, concrete denies any AH or VH poor to limited

## 2019-04-11 PROCEDURE — 99232 SBSQ HOSP IP/OBS MODERATE 35: CPT

## 2019-04-11 PROCEDURE — 12345: CPT | Mod: NC

## 2019-04-11 RX ORDER — HALOPERIDOL DECANOATE 100 MG/ML
5 INJECTION INTRAMUSCULAR ONCE
Qty: 0 | Refills: 0 | Status: COMPLETED | OUTPATIENT
Start: 2019-04-11 | End: 2019-04-11

## 2019-04-11 RX ADMIN — RISPERIDONE 1 MILLIGRAM(S): 4 TABLET ORAL at 08:45

## 2019-04-11 RX ADMIN — Medication 2 MILLIGRAM(S): at 04:08

## 2019-04-11 RX ADMIN — PALIPERIDONE 234 MILLIGRAM(S): 1.5 TABLET, EXTENDED RELEASE ORAL at 15:00

## 2019-04-11 RX ADMIN — HALOPERIDOL DECANOATE 5 MILLIGRAM(S): 100 INJECTION INTRAMUSCULAR at 04:08

## 2019-04-11 RX ADMIN — RISPERIDONE 1 MILLIGRAM(S): 4 TABLET ORAL at 21:06

## 2019-04-11 RX ADMIN — Medication 25 MILLIGRAM(S): at 08:45

## 2019-04-11 NOTE — PROGRESS NOTE BEHAVIORAL HEALTH - NSBHFUPINTERVALHXFT_PSY_A_CORE
Met with and evaluated patient. Chart reviewed and case discussed in tx team meeting.  No significant interval events are reported. patient has been in good behavioral control and more verbal.   Enhanced Supervision continues to monitor ADL and behavior.   Patient is less disorganized,  speaking in a normal voice and is  less irritable.  Denies any SI or HI. . No Rx SE  or sx TD/EPS are noted or reported.    Invega Sustenna given today.  Patient tolerating it well.

## 2019-04-11 NOTE — PROGRESS NOTE BEHAVIORAL HEALTH - OTHER
more cooperative with staff, improved response to direction/limits in fair control poor dentition "good" more organized, minimal disorganization, concrete denies any AH or VH older than stated age poor to limited

## 2019-04-12 PROCEDURE — 99233 SBSQ HOSP IP/OBS HIGH 50: CPT

## 2019-04-12 RX ORDER — RISPERIDONE 4 MG/1
2 TABLET ORAL
Qty: 0 | Refills: 0 | Status: DISCONTINUED | OUTPATIENT
Start: 2019-04-12 | End: 2019-06-20

## 2019-04-12 RX ORDER — BENZTROPINE MESYLATE 1 MG
1 TABLET ORAL
Qty: 0 | Refills: 0 | Status: DISCONTINUED | OUTPATIENT
Start: 2019-04-12 | End: 2019-05-06

## 2019-04-12 RX ORDER — HALOPERIDOL DECANOATE 100 MG/ML
5 INJECTION INTRAMUSCULAR ONCE
Qty: 0 | Refills: 0 | Status: COMPLETED | OUTPATIENT
Start: 2019-04-12 | End: 2019-04-12

## 2019-04-12 RX ADMIN — HALOPERIDOL DECANOATE 5 MILLIGRAM(S): 100 INJECTION INTRAMUSCULAR at 22:41

## 2019-04-12 RX ADMIN — Medication 25 MILLIGRAM(S): at 08:58

## 2019-04-12 RX ADMIN — Medication 2 MILLIGRAM(S): at 22:41

## 2019-04-12 RX ADMIN — RISPERIDONE 2 MILLIGRAM(S): 4 TABLET ORAL at 22:42

## 2019-04-12 RX ADMIN — RISPERIDONE 1 MILLIGRAM(S): 4 TABLET ORAL at 08:58

## 2019-04-12 RX ADMIN — Medication 2 MILLIGRAM(S): at 10:43

## 2019-04-12 RX ADMIN — HALOPERIDOL DECANOATE 5 MILLIGRAM(S): 100 INJECTION INTRAMUSCULAR at 10:42

## 2019-04-12 RX ADMIN — HALOPERIDOL DECANOATE 5 MILLIGRAM(S): 100 INJECTION INTRAMUSCULAR at 10:45

## 2019-04-12 RX ADMIN — HALOPERIDOL DECANOATE 5 MILLIGRAM(S): 100 INJECTION INTRAMUSCULAR at 14:44

## 2019-04-12 RX ADMIN — Medication 2 MILLIGRAM(S): at 14:44

## 2019-04-12 RX ADMIN — Medication 2 MILLIGRAM(S): at 10:45

## 2019-04-12 NOTE — PROGRESS NOTE BEHAVIORAL HEALTH - NSBHFUPINTERVALHXFT_PSY_A_CORE
Met with and evaluated patient. Chart reviewed and case discussed in tx team meeting.  No significant interval events are reported except patient became upset that his clothes were in the washing machine, and he did not have access to them, so he threw laundry bin at the door to the laundry room, and smashed the glass and broke the lock on the door.  Patient was yelling and screaming and threatening staff.  Jayson Owen called.  Patient escorted to his room, and given Haldol 5mg, and Ativan 2mg IM.  Patient had been offered po Rx, but threw pills on the floor. Constant Observation reinstated for safety of patient and others.  Needed manual hold to receive IM, but was calm after injection.     Patient is more disorganized,  and is  more irritable.  Denies any SI or HI. . No Rx SE  or sx TD/EPS are noted or reported.  Tolerating Rx well, and is calmer at this time. Met with and evaluated patient. Chart reviewed and case discussed in tx team meeting.  No significant interval events are reported except patient became upset that his clothes were in the washing machine, and he did not have access to them, so he threw laundry bin at the door to the laundry room, and smashed the glass and broke the lock on the door.  Patient was yelling and screaming and threatening staff.  Jayson Owen called.  Patient escorted to his room, and given Haldol 5mg, and Ativan 2mg IM.  Patient had been offered po Rx, but threw pills on the floor. Constant Observation reinstated for safety of patient and others.  Needed manual hold to receive IM, but was calm after injection.     Patient is more disorganized,  and is  more irritable.  Denies any SI or HI. . No Rx SE  or sx TD/EPS are noted or reported.  Tolerating Rx well, and is calmer at this time.  Increase Risperdal to 2mg bid.  for Invega Sustenna 4/16 Met with and evaluated patient. Chart reviewed and case discussed in tx team meeting.  No significant interval events are reported except patient became upset that his clothes were in the washing machine, and he did not have access to them, so he threw laundry bin at the door to the laundry room, and smashed the glass and broke the lock on the door.  Patient was yelling and screaming and threatening staff.  Jayson Owen called.  Patient escorted to his room, and given Haldol 5mg, and Ativan 2mg IM.  Patient had been offered po Rx, but threw pills on the floor. Constant Observation reinstated for safety of patient and others.  Needed manual hold to receive IM, but was calm after injection.     Patient is more disorganized,  and is  more irritable.  Denies any SI or HI. . No Rx SE  or sx TD/EPS are noted or reported.  Tolerating Rx well, and is calmer at this time.  Increase Risperdal to 2mg bid.  for Invega Sustenna 4/16 04/12/2019: Patient was seen today AM, he received PRN IM meds for stability as he was aggressive and broke the glass of the laundry room as he was fixated on getting his clothes from the washing machine. He later was re-directed to his room. He is up now, speech is understanding, but remains disorganized, disheveled, malodorous, and needs re-direction and was started again on 1:1 for disorganized behavior. Meds were re-adjusted for stability.

## 2019-04-12 NOTE — PROGRESS NOTE BEHAVIORAL HEALTH - OTHER
older than stated age combative and hostile at times, cooperative at times poor dentition labile and angry , concrete denies any AH or VH

## 2019-04-13 DIAGNOSIS — F20.5 RESIDUAL SCHIZOPHRENIA: ICD-10-CM

## 2019-04-13 RX ORDER — NICOTINE POLACRILEX 2 MG
1 GUM BUCCAL EVERY 4 HOURS
Qty: 0 | Refills: 0 | Status: DISCONTINUED | OUTPATIENT
Start: 2019-04-13 | End: 2019-06-20

## 2019-04-13 RX ADMIN — RISPERIDONE 2 MILLIGRAM(S): 4 TABLET ORAL at 21:10

## 2019-04-13 RX ADMIN — Medication 1 EACH: at 12:55

## 2019-04-13 RX ADMIN — Medication 2 MILLIGRAM(S): at 08:35

## 2019-04-13 RX ADMIN — Medication 2 MILLIGRAM(S): at 23:59

## 2019-04-13 RX ADMIN — RISPERIDONE 2 MILLIGRAM(S): 4 TABLET ORAL at 08:35

## 2019-04-13 RX ADMIN — Medication 25 MILLIGRAM(S): at 08:35

## 2019-04-13 NOTE — PROGRESS NOTE BEHAVIORAL HEALTH - NSBHFUPINTERVALHXFT_PSY_A_CORE
Pt noted to be walking in an out of room, trying to put a flower in a cup of soap. Denied acute issues, including SI/HI. Was hard to understand due to TD and poor dentition but cooperative with writer asking which high school writer went to, using [place] and [place] interchangeably in his answer. Pt noted to be walking in an out of room, trying to put a flower in a cup of soap. Denied acute issues, including SI/HI. Was hard to understand due to TD and poor dentition but cooperative with writer asking which high school writer went to, using [place] and [place] interchangeably in his answer. noted to be volatile, raises voice quickly when his request to go outside for a walk is denied. as per PCA no events since yesterday's pt slept well.

## 2019-04-14 LAB
APPEARANCE UR: CLEAR — SIGNIFICANT CHANGE UP
BILIRUB UR-MCNC: NEGATIVE — SIGNIFICANT CHANGE UP
COLOR SPEC: YELLOW — SIGNIFICANT CHANGE UP
DIFF PNL FLD: ABNORMAL
GLUCOSE UR QL: NEGATIVE MG/DL — SIGNIFICANT CHANGE UP
KETONES UR-MCNC: NEGATIVE — SIGNIFICANT CHANGE UP
LEUKOCYTE ESTERASE UR-ACNC: NEGATIVE — SIGNIFICANT CHANGE UP
NITRITE UR-MCNC: NEGATIVE — SIGNIFICANT CHANGE UP
PH UR: 6.5 — SIGNIFICANT CHANGE UP (ref 5–8)
PROT UR-MCNC: NEGATIVE MG/DL — SIGNIFICANT CHANGE UP
RBC CASTS # UR COMP ASSIST: SIGNIFICANT CHANGE UP /HPF (ref 0–4)
SP GR SPEC: 1.01 — SIGNIFICANT CHANGE UP (ref 1.01–1.02)
UROBILINOGEN FLD QL: NEGATIVE MG/DL — SIGNIFICANT CHANGE UP

## 2019-04-14 RX ORDER — TRAZODONE HCL 50 MG
100 TABLET ORAL AT BEDTIME
Qty: 0 | Refills: 0 | Status: DISCONTINUED | OUTPATIENT
Start: 2019-04-14 | End: 2019-04-26

## 2019-04-14 RX ADMIN — Medication 25 MILLIGRAM(S): at 09:02

## 2019-04-14 RX ADMIN — Medication 100 MILLIGRAM(S): at 20:20

## 2019-04-14 RX ADMIN — RISPERIDONE 2 MILLIGRAM(S): 4 TABLET ORAL at 20:20

## 2019-04-14 RX ADMIN — HALOPERIDOL DECANOATE 5 MILLIGRAM(S): 100 INJECTION INTRAMUSCULAR at 05:29

## 2019-04-14 RX ADMIN — HALOPERIDOL DECANOATE 5 MILLIGRAM(S): 100 INJECTION INTRAMUSCULAR at 20:20

## 2019-04-14 RX ADMIN — Medication 2 MILLIGRAM(S): at 09:02

## 2019-04-14 RX ADMIN — RISPERIDONE 2 MILLIGRAM(S): 4 TABLET ORAL at 09:02

## 2019-04-14 NOTE — PROGRESS NOTE BEHAVIORAL HEALTH - NSBHFUPINTERVALHXFT_PSY_A_CORE
Patient seen, evaluated and chart reviewed. Patient is a 62 y/o M with reported hx of schizophrenia sent in from [facility] for report of agitation.  Pt noted to be disorganized at baseline, having difficulty to focuse on chores at hand, losing his shirt and things in the shirt's pockets. Denied acute issues, including SI/HI. Was hard to understand due to TD and poor dentition. As per PCA no events since yesterday's pt slept well.

## 2019-04-15 PROCEDURE — 99232 SBSQ HOSP IP/OBS MODERATE 35: CPT

## 2019-04-15 RX ADMIN — HALOPERIDOL DECANOATE 5 MILLIGRAM(S): 100 INJECTION INTRAMUSCULAR at 08:17

## 2019-04-15 RX ADMIN — RISPERIDONE 2 MILLIGRAM(S): 4 TABLET ORAL at 22:33

## 2019-04-15 RX ADMIN — Medication 25 MILLIGRAM(S): at 09:06

## 2019-04-15 RX ADMIN — RISPERIDONE 2 MILLIGRAM(S): 4 TABLET ORAL at 09:06

## 2019-04-15 RX ADMIN — Medication 2 MILLIGRAM(S): at 08:18

## 2019-04-15 RX ADMIN — Medication 100 MILLIGRAM(S): at 22:34

## 2019-04-15 NOTE — PROGRESS NOTE BEHAVIORAL HEALTH - OTHER
older than stated age needs staff assist and encouragement tenuous poor dentition, "ok" annoyed concrete denies any AH or VH

## 2019-04-15 NOTE — PROGRESS NOTE BEHAVIORAL HEALTH - NSBHFUPINTERVALHXFT_PSY_A_CORE
Patient seen, evaluated and chart reviewed. Case discussed in tx team meeting. No significant interval events are reported.  Patient continues on constant observation and does have periods of irritable behavior, and needed prn Haldol and Ativan.  He is minimally verbal today, with poor eye contact, and paranoid thoughts.  Behavior continues in tenuous control. Denies any SI or HI, AH or VH.  No Rx SE or sx TD/EPS are noted or reported.

## 2019-04-16 PROCEDURE — 99232 SBSQ HOSP IP/OBS MODERATE 35: CPT

## 2019-04-16 RX ADMIN — HALOPERIDOL DECANOATE 5 MILLIGRAM(S): 100 INJECTION INTRAMUSCULAR at 07:45

## 2019-04-16 RX ADMIN — RISPERIDONE 2 MILLIGRAM(S): 4 TABLET ORAL at 20:24

## 2019-04-16 RX ADMIN — Medication 100 MILLIGRAM(S): at 20:24

## 2019-04-16 RX ADMIN — Medication 2 MILLIGRAM(S): at 07:45

## 2019-04-16 RX ADMIN — Medication 25 MILLIGRAM(S): at 09:40

## 2019-04-16 RX ADMIN — PALIPERIDONE 156 MILLIGRAM(S): 1.5 TABLET, EXTENDED RELEASE ORAL at 15:35

## 2019-04-16 RX ADMIN — HALOPERIDOL DECANOATE 5 MILLIGRAM(S): 100 INJECTION INTRAMUSCULAR at 20:24

## 2019-04-16 RX ADMIN — Medication 2 MILLIGRAM(S): at 21:30

## 2019-04-16 RX ADMIN — RISPERIDONE 2 MILLIGRAM(S): 4 TABLET ORAL at 09:40

## 2019-04-16 NOTE — PROGRESS NOTE BEHAVIORAL HEALTH - NSBHFUPINTERVALHXFT_PSY_A_CORE
Patient seen, evaluated and chart reviewed. Case discussed in tx team meeting. No significant interval events are reported.  Patient was in hallway reading unit schedule and reported he wanted to go to a group.  Went into day area, and sat on the side of the group.  Patient continues on constant observation for safety due to angry, irritable mood and psychotic sx. He continues to be very irritable at times and needed prn Haldol and Ativan.  He is minimally verbal today, with poor eye contact, and paranoid thoughts. He has concerns that others are against him, and that he is here to fix the roof, but reports he will not fix anything today.  Behavior continues in tenuous control. Denies any SI or HI, AH or VH.  No Rx SE or sx TD/EPS are noted or reported.

## 2019-04-16 NOTE — PROGRESS NOTE BEHAVIORAL HEALTH - OTHER
older than stated age needs staff assist and encouragement tenuous loud at times poor dentition, "good" annoyed concrete denies any AH or VH

## 2019-04-17 PROCEDURE — 99232 SBSQ HOSP IP/OBS MODERATE 35: CPT

## 2019-04-17 RX ADMIN — Medication 2 MILLIGRAM(S): at 09:20

## 2019-04-17 RX ADMIN — Medication 25 MILLIGRAM(S): at 09:21

## 2019-04-17 RX ADMIN — RISPERIDONE 2 MILLIGRAM(S): 4 TABLET ORAL at 21:30

## 2019-04-17 RX ADMIN — Medication 2 MILLIGRAM(S): at 16:38

## 2019-04-17 RX ADMIN — Medication 100 MILLIGRAM(S): at 21:30

## 2019-04-17 RX ADMIN — HALOPERIDOL DECANOATE 5 MILLIGRAM(S): 100 INJECTION INTRAMUSCULAR at 09:21

## 2019-04-17 RX ADMIN — RISPERIDONE 2 MILLIGRAM(S): 4 TABLET ORAL at 09:21

## 2019-04-17 NOTE — PROGRESS NOTE BEHAVIORAL HEALTH - NSBHFUPINTERVALHXFT_PSY_A_CORE
Patient seen, evaluated and chart reviewed. Case discussed in tx team meeting. No significant interval events are reported.  Patient has been riding the exercise bike, and reports he likes this.  Patient continues on constant observation for safety due to angry, irritable mood and psychotic sx. He continues to be irritable at times and needed prn Haldol and Ativan, but was calmer today.   He is more verbal today, with poor eye contact, and paranoid thoughts. He has concerns that others are against him, and that he is not sure why.   Behavior continues in tenuous control. Denies any SI or HI, AH or VH.  No Rx SE or sx TD/EPS are noted or reported.

## 2019-04-17 NOTE — PROGRESS NOTE BEHAVIORAL HEALTH - OTHER
denies any AH or VH older than stated age needs staff assist and encouragement tenuous poor dentition, "good" annoyed at times concrete

## 2019-04-18 PROCEDURE — 99231 SBSQ HOSP IP/OBS SF/LOW 25: CPT

## 2019-04-18 RX ORDER — DOCUSATE SODIUM 100 MG
100 CAPSULE ORAL THREE TIMES A DAY
Qty: 0 | Refills: 0 | Status: DISCONTINUED | OUTPATIENT
Start: 2019-04-18 | End: 2019-04-19

## 2019-04-18 RX ORDER — MAGNESIUM HYDROXIDE 400 MG/1
30 TABLET, CHEWABLE ORAL DAILY
Qty: 0 | Refills: 0 | Status: DISCONTINUED | OUTPATIENT
Start: 2019-04-18 | End: 2019-06-20

## 2019-04-18 RX ADMIN — RISPERIDONE 2 MILLIGRAM(S): 4 TABLET ORAL at 21:13

## 2019-04-18 RX ADMIN — Medication 1 MILLIGRAM(S): at 13:56

## 2019-04-18 RX ADMIN — Medication 100 MILLIGRAM(S): at 21:13

## 2019-04-18 RX ADMIN — Medication 25 MILLIGRAM(S): at 08:49

## 2019-04-18 RX ADMIN — Medication 2 MILLIGRAM(S): at 13:55

## 2019-04-18 RX ADMIN — RISPERIDONE 2 MILLIGRAM(S): 4 TABLET ORAL at 08:49

## 2019-04-18 RX ADMIN — MAGNESIUM HYDROXIDE 30 MILLILITER(S): 400 TABLET, CHEWABLE ORAL at 10:52

## 2019-04-18 RX ADMIN — HALOPERIDOL DECANOATE 5 MILLIGRAM(S): 100 INJECTION INTRAMUSCULAR at 13:56

## 2019-04-18 NOTE — PROGRESS NOTE BEHAVIORAL HEALTH - NSBHFUPINTERVALHXFT_PSY_A_CORE
Patient seen, evaluated and chart reviewed. Case discussed in tx team meeting.  Patient continues on constant observation for safety due to angry, irritable mood and psychotic sx. He continues to be irritable at times and needed prn Haldol and Ativan, and is drowsy now.

## 2019-04-18 NOTE — PROGRESS NOTE BEHAVIORAL HEALTH - OTHER
older than stated age needs staff assist and encouragement tenuous - - - poor dentition, "good" annoyed at times concrete denies any AH or VH

## 2019-04-19 PROCEDURE — 99231 SBSQ HOSP IP/OBS SF/LOW 25: CPT

## 2019-04-19 RX ORDER — DOCUSATE SODIUM 100 MG
100 CAPSULE ORAL DAILY
Qty: 0 | Refills: 0 | Status: DISCONTINUED | OUTPATIENT
Start: 2019-04-19 | End: 2019-04-19

## 2019-04-19 RX ORDER — POLYETHYLENE GLYCOL 3350 17 G/17G
17 POWDER, FOR SOLUTION ORAL DAILY
Qty: 0 | Refills: 0 | Status: DISCONTINUED | OUTPATIENT
Start: 2019-04-19 | End: 2019-06-20

## 2019-04-19 RX ORDER — DOCUSATE SODIUM 100 MG
100 CAPSULE ORAL ONCE
Qty: 0 | Refills: 0 | Status: COMPLETED | OUTPATIENT
Start: 2019-04-19 | End: 2019-04-19

## 2019-04-19 RX ORDER — DOCUSATE SODIUM 100 MG
100 CAPSULE ORAL THREE TIMES A DAY
Qty: 0 | Refills: 0 | Status: DISCONTINUED | OUTPATIENT
Start: 2019-04-19 | End: 2019-04-21

## 2019-04-19 RX ADMIN — RISPERIDONE 2 MILLIGRAM(S): 4 TABLET ORAL at 08:37

## 2019-04-19 RX ADMIN — Medication 2 MILLIGRAM(S): at 21:17

## 2019-04-19 RX ADMIN — Medication 1 EACH: at 20:28

## 2019-04-19 RX ADMIN — RISPERIDONE 2 MILLIGRAM(S): 4 TABLET ORAL at 20:28

## 2019-04-19 RX ADMIN — HALOPERIDOL DECANOATE 5 MILLIGRAM(S): 100 INJECTION INTRAMUSCULAR at 10:52

## 2019-04-19 RX ADMIN — HALOPERIDOL DECANOATE 5 MILLIGRAM(S): 100 INJECTION INTRAMUSCULAR at 21:17

## 2019-04-19 RX ADMIN — Medication 2 MILLIGRAM(S): at 10:52

## 2019-04-19 RX ADMIN — Medication 100 MILLIGRAM(S): at 20:28

## 2019-04-19 RX ADMIN — Medication 25 MILLIGRAM(S): at 08:37

## 2019-04-19 RX ADMIN — Medication 100 MILLIGRAM(S): at 11:15

## 2019-04-19 RX ADMIN — Medication 100 MILLIGRAM(S): at 20:56

## 2019-04-19 RX ADMIN — Medication 100 MILLIGRAM(S): at 13:05

## 2019-04-19 NOTE — PROGRESS NOTE BEHAVIORAL HEALTH - OTHER
older than stated age needs staff assist and encouragement tenuous poor dentition, "good" annoyed at times concrete denies any AH or VH

## 2019-04-19 NOTE — PROGRESS NOTE BEHAVIORAL HEALTH - NSBHFUPINTERVALHXFT_PSY_A_CORE
Patient seen, evaluated and chart reviewed. Case discussed in tx team meeting.  Patient continues on constant observation for safety due to angry, irritable mood and psychotic sx. He continues to be irritable at times and needed prn Haldol and Ativan. He will continue on constant observation while awake, but not while he is sleeping since he has not been an acute risk to himself or others but needs redirection because of disorganized behavior.

## 2019-04-20 PROCEDURE — 99231 SBSQ HOSP IP/OBS SF/LOW 25: CPT

## 2019-04-20 RX ORDER — CLONAZEPAM 1 MG
0.25 TABLET ORAL
Qty: 0 | Refills: 0 | Status: DISCONTINUED | OUTPATIENT
Start: 2019-04-20 | End: 2019-04-22

## 2019-04-20 RX ADMIN — Medication 100 MILLIGRAM(S): at 09:50

## 2019-04-20 RX ADMIN — Medication 25 MILLIGRAM(S): at 09:50

## 2019-04-20 RX ADMIN — Medication 100 MILLIGRAM(S): at 20:39

## 2019-04-20 RX ADMIN — RISPERIDONE 2 MILLIGRAM(S): 4 TABLET ORAL at 09:50

## 2019-04-20 RX ADMIN — Medication 0.25 MILLIGRAM(S): at 20:48

## 2019-04-20 RX ADMIN — RISPERIDONE 2 MILLIGRAM(S): 4 TABLET ORAL at 20:40

## 2019-04-20 NOTE — PROGRESS NOTE BEHAVIORAL HEALTH - NSBHFUPINTERVALHXFT_PSY_A_CORE
Patient seen, evaluated and chart reviewed.   Patient continues on constant observation (while awake) for safety due to angry, irritable mood and psychotic sx. He continues to be irritable at times and needed prn Haldol and Ativan. He will continue on constant observation while awake, but not while he is sleeping since he has not been an acute risk to himself or others but needs redirection because of disorganized behavior.   PT does appear restless and may benefit from a small dose of Clonazepam for possible akathisia.

## 2019-04-20 NOTE — PROGRESS NOTE BEHAVIORAL HEALTH - OTHER
needs staff assist and encouragement tenuous poor dentition, "good" annoyed at times concrete denies any AH or VH older than stated age

## 2019-04-21 PROCEDURE — 99231 SBSQ HOSP IP/OBS SF/LOW 25: CPT

## 2019-04-21 RX ADMIN — Medication 100 MILLIGRAM(S): at 00:13

## 2019-04-21 RX ADMIN — Medication 0.25 MILLIGRAM(S): at 08:18

## 2019-04-21 RX ADMIN — RISPERIDONE 2 MILLIGRAM(S): 4 TABLET ORAL at 08:22

## 2019-04-21 RX ADMIN — Medication 25 MILLIGRAM(S): at 08:22

## 2019-04-21 RX ADMIN — Medication 100 MILLIGRAM(S): at 08:22

## 2019-04-21 RX ADMIN — HALOPERIDOL DECANOATE 5 MILLIGRAM(S): 100 INJECTION INTRAMUSCULAR at 00:07

## 2019-04-21 RX ADMIN — RISPERIDONE 2 MILLIGRAM(S): 4 TABLET ORAL at 22:11

## 2019-04-21 RX ADMIN — Medication 0.25 MILLIGRAM(S): at 22:11

## 2019-04-21 RX ADMIN — Medication 2 MILLIGRAM(S): at 00:06

## 2019-04-21 RX ADMIN — Medication 100 MILLIGRAM(S): at 22:11

## 2019-04-21 NOTE — PROGRESS NOTE BEHAVIORAL HEALTH - NSBHFUPINTERVALHXFT_PSY_A_CORE
Patient seen, evaluated and chart reviewed.   Patient continues on constant observation (while awake) for safety due to angry, irritable mood and psychotic sx. He continues to be irritable at times and needed prn Haldol and Ativan. He will continue on constant observation while awake, but not while he is sleeping since he has not been an acute risk to himself or others but needs redirection because of disorganized behavior.   PT was started on a small dose of Clonazepam for restlessness secondary to antipsychotics.

## 2019-04-22 PROCEDURE — 99232 SBSQ HOSP IP/OBS MODERATE 35: CPT

## 2019-04-22 RX ORDER — CLONAZEPAM 1 MG
1 TABLET ORAL AT BEDTIME
Qty: 0 | Refills: 0 | Status: DISCONTINUED | OUTPATIENT
Start: 2019-04-22 | End: 2019-04-26

## 2019-04-22 RX ORDER — CLONAZEPAM 1 MG
0.25 TABLET ORAL DAILY
Qty: 0 | Refills: 0 | Status: DISCONTINUED | OUTPATIENT
Start: 2019-04-22 | End: 2019-04-26

## 2019-04-22 RX ADMIN — Medication 100 MILLIGRAM(S): at 20:27

## 2019-04-22 RX ADMIN — Medication 25 MILLIGRAM(S): at 08:39

## 2019-04-22 RX ADMIN — HALOPERIDOL DECANOATE 5 MILLIGRAM(S): 100 INJECTION INTRAMUSCULAR at 20:30

## 2019-04-22 RX ADMIN — HALOPERIDOL DECANOATE 5 MILLIGRAM(S): 100 INJECTION INTRAMUSCULAR at 02:42

## 2019-04-22 RX ADMIN — RISPERIDONE 2 MILLIGRAM(S): 4 TABLET ORAL at 08:39

## 2019-04-22 RX ADMIN — Medication 2 MILLIGRAM(S): at 21:23

## 2019-04-22 RX ADMIN — Medication 0.25 MILLIGRAM(S): at 08:39

## 2019-04-22 RX ADMIN — Medication 2 MILLIGRAM(S): at 02:42

## 2019-04-22 NOTE — PROGRESS NOTE BEHAVIORAL HEALTH - OTHER
older than stated age needs staff assist and encouragement tenuous poor dentition, annoyed at times concrete denies any AH or VH

## 2019-04-22 NOTE — PROGRESS NOTE BEHAVIORAL HEALTH - NSBHFUPINTERVALHXFT_PSY_A_CORE
Patient seen, evaluated and chart reviewed.  Case discussed in tx team meeting.   Patient continues on constant observation, now while asleep or awake, as night staff reports patient is more irritable at night,  for safety due to angry, irritable mood and psychotic sx. He continues to be irritable at times and needed prn Haldol and Ativan.   Patient continues disorganized and delusional.   He denies any AH, but is preoccupied at times. Klonopin dose increased to 0.25mg qam, and 1mg hs.  No Rx SE or sx TD/EPS are noted or reported.  Patient denies any SI or HI.

## 2019-04-23 PROCEDURE — 99232 SBSQ HOSP IP/OBS MODERATE 35: CPT

## 2019-04-23 RX ORDER — PALIPERIDONE 1.5 MG/1
156 TABLET, EXTENDED RELEASE ORAL ONCE
Qty: 0 | Refills: 0 | Status: COMPLETED | OUTPATIENT
Start: 2019-05-14 | End: 2019-05-14

## 2019-04-23 RX ADMIN — HALOPERIDOL DECANOATE 5 MILLIGRAM(S): 100 INJECTION INTRAMUSCULAR at 14:56

## 2019-04-23 RX ADMIN — Medication 1 MILLIGRAM(S): at 20:11

## 2019-04-23 RX ADMIN — Medication 2 MILLIGRAM(S): at 06:49

## 2019-04-23 RX ADMIN — Medication 2 MILLIGRAM(S): at 00:41

## 2019-04-23 RX ADMIN — RISPERIDONE 2 MILLIGRAM(S): 4 TABLET ORAL at 20:11

## 2019-04-23 RX ADMIN — Medication 2 MILLIGRAM(S): at 14:55

## 2019-04-23 NOTE — PROGRESS NOTE BEHAVIORAL HEALTH - NSBHFUPINTERVALHXFT_PSY_A_CORE
Patient seen, evaluated and chart reviewed.  Case discussed in tx team meeting.   Patient continues on constant observation,, for safety due to angry, irritable mood and psychotic sx. He continues to be irritable and angry at times and needed prn Haldol and Ativan.   Patient continues disorganized and delusional.  Today, he believes the police are responsible for his hospitalizations, and that the police are picking on him.  He denies any AH, but is preoccupied at times No Rx SE or sx TD/EPS are noted or reported.  Patient denies any SI or HI. Patient is verbal, even laughed a little, appropriately today.  Plan is to discharge patient back to [facility]

## 2019-04-23 NOTE — PROGRESS NOTE BEHAVIORAL HEALTH - OTHER
annoyed at times concrete denies any AH or VH older than stated age needs staff assist and encouragement refuses to let staff wash his clothing which is spotted and dirty tenuous poor dentition,

## 2019-04-24 PROCEDURE — 99232 SBSQ HOSP IP/OBS MODERATE 35: CPT

## 2019-04-24 RX ADMIN — Medication 2 MILLIGRAM(S): at 09:31

## 2019-04-24 RX ADMIN — Medication 1 MILLIGRAM(S): at 20:46

## 2019-04-24 RX ADMIN — Medication 100 MILLIGRAM(S): at 20:46

## 2019-04-24 RX ADMIN — RISPERIDONE 2 MILLIGRAM(S): 4 TABLET ORAL at 20:46

## 2019-04-24 RX ADMIN — Medication 25 MILLIGRAM(S): at 09:32

## 2019-04-24 RX ADMIN — HALOPERIDOL DECANOATE 5 MILLIGRAM(S): 100 INJECTION INTRAMUSCULAR at 09:32

## 2019-04-24 RX ADMIN — Medication 0.25 MILLIGRAM(S): at 09:32

## 2019-04-24 RX ADMIN — HALOPERIDOL DECANOATE 5 MILLIGRAM(S): 100 INJECTION INTRAMUSCULAR at 20:46

## 2019-04-24 RX ADMIN — RISPERIDONE 2 MILLIGRAM(S): 4 TABLET ORAL at 09:32

## 2019-04-24 NOTE — PROGRESS NOTE BEHAVIORAL HEALTH - OTHER
older than stated age needs staff assist and encouragement refuses to let staff wash his clothing which is spotted and dirty tenuous very loud at times poor dentition, annoyed at times concrete denies any AH or VH, but preoccupied

## 2019-04-24 NOTE — PROGRESS NOTE BEHAVIORAL HEALTH - NSBHFUPINTERVALHXFT_PSY_A_CORE
Patient seen, evaluated and chart reviewed.  Case discussed in tx team meeting.   Patient continues on constant observation,, for safety due to angry, irritable mood and psychotic sx. He continues to be irritable, very loud  and angry at times and needed prn Haldol and Ativan.   Patient continues disorganized and delusional.  Today, he believes that his former psychiatrist knows he is here and will come and get him and take him home.  He denies any AH, but is preoccupied at times.  Patient did shower today, but refuses to let staff wash his clothes.  No Rx SE or sx TD/EPS are noted or reported.  Patient denies any SI or HI. Patient is hyperverbal and angry today. .  Plan is to discharge patient back to [facility]

## 2019-04-25 PROCEDURE — 99233 SBSQ HOSP IP/OBS HIGH 50: CPT

## 2019-04-25 RX ORDER — HALOPERIDOL DECANOATE 100 MG/ML
5 INJECTION INTRAMUSCULAR ONCE
Qty: 0 | Refills: 0 | Status: COMPLETED | OUTPATIENT
Start: 2019-04-25 | End: 2019-04-25

## 2019-04-25 RX ORDER — HALOPERIDOL DECANOATE 100 MG/ML
5 INJECTION INTRAMUSCULAR ONCE
Qty: 0 | Refills: 0 | Status: DISCONTINUED | OUTPATIENT
Start: 2019-04-25 | End: 2019-04-25

## 2019-04-25 RX ORDER — DIPHENHYDRAMINE HCL 50 MG
50 CAPSULE ORAL ONCE
Qty: 0 | Refills: 0 | Status: COMPLETED | OUTPATIENT
Start: 2019-04-25 | End: 2019-04-25

## 2019-04-25 RX ADMIN — HALOPERIDOL DECANOATE 5 MILLIGRAM(S): 100 INJECTION INTRAMUSCULAR at 11:00

## 2019-04-25 RX ADMIN — Medication 100 MILLIGRAM(S): at 20:54

## 2019-04-25 RX ADMIN — Medication 25 MILLIGRAM(S): at 09:02

## 2019-04-25 RX ADMIN — RISPERIDONE 2 MILLIGRAM(S): 4 TABLET ORAL at 09:02

## 2019-04-25 RX ADMIN — Medication 2 MILLIGRAM(S): at 11:00

## 2019-04-25 RX ADMIN — Medication 1 MILLIGRAM(S): at 20:54

## 2019-04-25 RX ADMIN — Medication 0.25 MILLIGRAM(S): at 09:01

## 2019-04-25 RX ADMIN — Medication 50 MILLIGRAM(S): at 12:28

## 2019-04-25 RX ADMIN — RISPERIDONE 2 MILLIGRAM(S): 4 TABLET ORAL at 20:54

## 2019-04-25 NOTE — PROGRESS NOTE BEHAVIORAL HEALTH - OTHER
angry concrete denies any AH or VH, but preoccupied older than stated age needs staff assist and encouragement refuses to let staff wash his clothing which is spotted and dirty threatening staff at times very loud at times poor dentition,

## 2019-04-25 NOTE — PROGRESS NOTE BEHAVIORAL HEALTH - NSBHFUPINTERVALHXFT_PSY_A_CORE
Patient seen, evaluated and chart reviewed.  Case discussed in tx team meeting.  No significant interval events are reported except that patient needed IM Haldol and Ativan this am due to agitated behavior and threatening staff.  Patient then needed Benadryl IM approx 2 hours later due to EPS of stiffness and drooling. Patient was cooperative with all injections.  Patient is calmer at this time.   Patient continues on constant observation,, for safety due to angry, irritable mood and psychotic sx. He continues to be irritable, very loud  and angry at times and threatening staff at times.    Patient continues disorganized and delusional.  Today, he believes he can call a cab and go to his home in [facility], and that he has money waiting there for him.   He denies any AH, but is preoccupied at times.   No Rx SE or sx TD/EPS are noted or reported except some stiffness and drooling after Haldol injection today.   Patient denies any SI or HI. Patient is hyperverbal and angry today. .  Plan is to discharge patient back to [facility]

## 2019-04-26 PROCEDURE — 99232 SBSQ HOSP IP/OBS MODERATE 35: CPT

## 2019-04-26 RX ORDER — CLONAZEPAM 1 MG
0.25 TABLET ORAL
Qty: 0 | Refills: 0 | Status: DISCONTINUED | OUTPATIENT
Start: 2019-04-26 | End: 2019-05-02

## 2019-04-26 RX ORDER — TRAZODONE HCL 50 MG
50 TABLET ORAL
Qty: 0 | Refills: 0 | Status: DISCONTINUED | OUTPATIENT
Start: 2019-04-26 | End: 2019-06-20

## 2019-04-26 RX ORDER — CLONAZEPAM 1 MG
1 TABLET ORAL AT BEDTIME
Qty: 0 | Refills: 0 | Status: DISCONTINUED | OUTPATIENT
Start: 2019-04-26 | End: 2019-05-02

## 2019-04-26 RX ORDER — TRAZODONE HCL 50 MG
50 TABLET ORAL AT BEDTIME
Qty: 0 | Refills: 0 | Status: DISCONTINUED | OUTPATIENT
Start: 2019-04-26 | End: 2019-06-20

## 2019-04-26 RX ADMIN — Medication 25 MILLIGRAM(S): at 09:36

## 2019-04-26 RX ADMIN — RISPERIDONE 2 MILLIGRAM(S): 4 TABLET ORAL at 23:48

## 2019-04-26 RX ADMIN — Medication 50 MILLIGRAM(S): at 17:36

## 2019-04-26 RX ADMIN — Medication 2 MILLIGRAM(S): at 13:54

## 2019-04-26 RX ADMIN — Medication 1 MILLIGRAM(S): at 23:48

## 2019-04-26 RX ADMIN — Medication 0.25 MILLIGRAM(S): at 09:35

## 2019-04-26 RX ADMIN — RISPERIDONE 2 MILLIGRAM(S): 4 TABLET ORAL at 09:36

## 2019-04-26 NOTE — PROGRESS NOTE BEHAVIORAL HEALTH - OTHER
older than stated age needs staff assist and encouragement refuses to let staff wash his clothing which is spotted and dirty threatening staff at times very loud at times poor dentition, angry concrete denies any AH or VH, but preoccupied

## 2019-04-26 NOTE — PROGRESS NOTE BEHAVIORAL HEALTH - NSBHFUPINTERVALHXFT_PSY_A_CORE
Patient seen, evaluated and chart reviewed.  Case discussed in tx team meeting.  No significant interval events are reported except that patient continues to need IM Haldol and Ativan this am due to agitated behavior and threatening staff.   Patient continues on constant observation,, for safety due to angry, irritable mood and psychotic sx. He continues to be irritable, very loud  and angry at times and threatening staff at times.    Patient continues disorganized and delusional.  Today, he believes he can call a cab and go to his home in [facility].  Pt has no SI/HI but continues to have disorganized thoughts and behaviors.

## 2019-04-27 PROCEDURE — 99232 SBSQ HOSP IP/OBS MODERATE 35: CPT

## 2019-04-27 RX ADMIN — RISPERIDONE 2 MILLIGRAM(S): 4 TABLET ORAL at 20:28

## 2019-04-27 RX ADMIN — Medication 50 MILLIGRAM(S): at 20:28

## 2019-04-27 RX ADMIN — RISPERIDONE 2 MILLIGRAM(S): 4 TABLET ORAL at 08:35

## 2019-04-27 RX ADMIN — Medication 2 MILLIGRAM(S): at 08:35

## 2019-04-27 RX ADMIN — Medication 1 MILLIGRAM(S): at 20:28

## 2019-04-27 RX ADMIN — Medication 50 MILLIGRAM(S): at 17:27

## 2019-04-27 RX ADMIN — Medication 0.25 MILLIGRAM(S): at 14:45

## 2019-04-27 RX ADMIN — HALOPERIDOL DECANOATE 5 MILLIGRAM(S): 100 INJECTION INTRAMUSCULAR at 10:59

## 2019-04-27 RX ADMIN — Medication 25 MILLIGRAM(S): at 08:35

## 2019-04-27 NOTE — PROGRESS NOTE BEHAVIORAL HEALTH - NSBHFUPINTERVALHXFT_PSY_A_CORE
Patient seen, evaluated and chart reviewed.  Case discussed in tx team meeting.  No significant interval events are reported except that patient continues to need IM Haldol and Ativan this am due to agitated behavior and threatening staff.   Patient continues on constant observation,, for safety due to angry, irritable mood and psychotic sx. He continues to be irritable, very loud  and angry at times and threatening staff at times.  Patient continues disorganized and delusional.  Pt has no SI/HI but continues to have disorganized thoughts and behaviors.

## 2019-04-28 PROCEDURE — 99231 SBSQ HOSP IP/OBS SF/LOW 25: CPT

## 2019-04-28 RX ADMIN — Medication 50 MILLIGRAM(S): at 17:08

## 2019-04-28 RX ADMIN — Medication 0.25 MILLIGRAM(S): at 08:12

## 2019-04-28 RX ADMIN — HALOPERIDOL DECANOATE 5 MILLIGRAM(S): 100 INJECTION INTRAMUSCULAR at 10:28

## 2019-04-28 RX ADMIN — Medication 2 MILLIGRAM(S): at 10:30

## 2019-04-28 RX ADMIN — RISPERIDONE 2 MILLIGRAM(S): 4 TABLET ORAL at 19:58

## 2019-04-28 RX ADMIN — Medication 1 MILLIGRAM(S): at 19:58

## 2019-04-28 RX ADMIN — Medication 0.25 MILLIGRAM(S): at 15:15

## 2019-04-28 RX ADMIN — RISPERIDONE 2 MILLIGRAM(S): 4 TABLET ORAL at 08:10

## 2019-04-28 RX ADMIN — Medication 25 MILLIGRAM(S): at 08:10

## 2019-04-28 RX ADMIN — Medication 50 MILLIGRAM(S): at 19:58

## 2019-04-28 NOTE — PROGRESS NOTE BEHAVIORAL HEALTH - NSBHFUPINTERVALHXFT_PSY_A_CORE
Patient seen, evaluated and chart reviewed.  Case discussed with the nursing staff. No significant interval events are reported except that patient continues to need IM Haldol and Ativan this am due to agitated behavior and threatening staff.   Patient continues on constant observation, for safety due to angry, irritable mood and psychotic sx. He continues to be irritable, very loud  and angry at times and threatening staff at times.  Pt has no SI/HI but continues to have disorganized thoughts and behaviors.

## 2019-04-29 PROCEDURE — 99233 SBSQ HOSP IP/OBS HIGH 50: CPT

## 2019-04-29 RX ORDER — HALOPERIDOL DECANOATE 100 MG/ML
5 INJECTION INTRAMUSCULAR ONCE
Qty: 0 | Refills: 0 | Status: COMPLETED | OUTPATIENT
Start: 2019-04-29 | End: 2019-04-29

## 2019-04-29 RX ADMIN — Medication 50 MILLIGRAM(S): at 20:53

## 2019-04-29 RX ADMIN — Medication 1 MILLIGRAM(S): at 20:52

## 2019-04-29 RX ADMIN — RISPERIDONE 2 MILLIGRAM(S): 4 TABLET ORAL at 20:53

## 2019-04-29 RX ADMIN — HALOPERIDOL DECANOATE 5 MILLIGRAM(S): 100 INJECTION INTRAMUSCULAR at 09:23

## 2019-04-29 RX ADMIN — Medication 2 MILLIGRAM(S): at 09:23

## 2019-04-29 RX ADMIN — Medication 50 MILLIGRAM(S): at 17:28

## 2019-04-29 NOTE — PROGRESS NOTE BEHAVIORAL HEALTH - NSBHFUPINTERVALHXFT_PSY_A_CORE
Patient seen, evaluated and chart reviewed.  Case discussed in tx team review. . No significant interval events are reported except that patient continues to need IM Haldol and Ativan this am due to agitated behavior and threatening staff. Patient refused po Rx and was not able to respond well to staff support and redirection.   Patient is calmer this pm, but continues paranoid and loud at times.   Patient continues on constant observation, for safety due to angry, irritable mood and psychotic sx. He continues to be irritable, very loud  and angry at times and threatening staff at times.  Pt has no SI/HI but continues to have disorganized thoughts and behaviors.  No Rx SE or sx TD/EPS are noted or reported.

## 2019-04-30 PROCEDURE — 99232 SBSQ HOSP IP/OBS MODERATE 35: CPT

## 2019-04-30 RX ADMIN — HALOPERIDOL DECANOATE 5 MILLIGRAM(S): 100 INJECTION INTRAMUSCULAR at 08:44

## 2019-04-30 RX ADMIN — Medication 0.25 MILLIGRAM(S): at 08:44

## 2019-04-30 RX ADMIN — Medication 50 MILLIGRAM(S): at 17:05

## 2019-04-30 RX ADMIN — Medication 25 MILLIGRAM(S): at 08:44

## 2019-04-30 RX ADMIN — RISPERIDONE 2 MILLIGRAM(S): 4 TABLET ORAL at 22:26

## 2019-04-30 RX ADMIN — Medication 0.25 MILLIGRAM(S): at 13:06

## 2019-04-30 RX ADMIN — Medication 50 MILLIGRAM(S): at 22:26

## 2019-04-30 RX ADMIN — Medication 2 MILLIGRAM(S): at 01:26

## 2019-04-30 RX ADMIN — Medication 1 MILLIGRAM(S): at 22:25

## 2019-04-30 RX ADMIN — HALOPERIDOL DECANOATE 5 MILLIGRAM(S): 100 INJECTION INTRAMUSCULAR at 22:26

## 2019-04-30 RX ADMIN — HALOPERIDOL DECANOATE 5 MILLIGRAM(S): 100 INJECTION INTRAMUSCULAR at 01:25

## 2019-04-30 RX ADMIN — RISPERIDONE 2 MILLIGRAM(S): 4 TABLET ORAL at 08:44

## 2019-04-30 RX ADMIN — Medication 2 MILLIGRAM(S): at 08:44

## 2019-04-30 NOTE — PROGRESS NOTE BEHAVIORAL HEALTH - OTHER
older than stated age needs staff assist and encouragement tenuous loud at times poor dentition, concrete denies any AH or VH, but preoccupied

## 2019-04-30 NOTE — PROGRESS NOTE BEHAVIORAL HEALTH - NSBHFUPINTERVALHXFT_PSY_A_CORE
Patient seen, evaluated and chart reviewed.  Case discussed in tx team review. . No significant interval events are reported except that patient continues to need prn po Haldol and Ativan  due to episodes of verbal agitation.   Patient is somewhat calmer , but continues paranoid and loud at times.  Delusional thoughts continue with patient reporting that others are out to get him, and he needs them to stay away.   Patient continues on constant observation, for safety due to angry, irritable mood and psychotic sx. He continues to be irritable, very loud  and angry at times but did not threaten staff today  Pt has no SI/HI but continues to have disorganized thoughts and behaviors.  No Rx SE or sx TD/EPS are noted or reported.

## 2019-05-01 PROCEDURE — 99232 SBSQ HOSP IP/OBS MODERATE 35: CPT

## 2019-05-01 RX ADMIN — Medication 50 MILLIGRAM(S): at 18:48

## 2019-05-01 RX ADMIN — RISPERIDONE 2 MILLIGRAM(S): 4 TABLET ORAL at 08:52

## 2019-05-01 RX ADMIN — Medication 0.25 MILLIGRAM(S): at 08:52

## 2019-05-01 RX ADMIN — Medication 0.25 MILLIGRAM(S): at 14:16

## 2019-05-01 RX ADMIN — Medication 25 MILLIGRAM(S): at 08:52

## 2019-05-01 NOTE — PROGRESS NOTE BEHAVIORAL HEALTH - OTHER
poor dentition, concrete denies any AH or VH, but preoccupied older than stated age needs staff assist and encouragement tenuous

## 2019-05-01 NOTE — PROGRESS NOTE BEHAVIORAL HEALTH - NSBHFUPINTERVALHXFT_PSY_A_CORE
Patient seen, evaluated and chart reviewed.  Case discussed in tx team review. . No significant interval events are reported except that patient continues to need prn po Haldol and Ativan  due to episodes of verbal agitation.   Patient is somewhat calmer , but continues paranoid and loud at times.  Patient was sitting on his bed and masturbating today.  Delusional thoughts continue with patient reporting that others are out to get him, and he needs them to stay away, and needs a weekend pass to escape from them.   Patient continues on constant observation, for safety due to angry, irritable mood and psychotic sx. He continues to be irritable,  and angry at times but did not threaten staff today  Pt has no SI/HI but continues to have disorganized thoughts and behaviors.  Denies any AH or VH.  No Rx SE or sx TD/EPS are noted or reported.  Due to patient's ongoing psychosis, irritability, verbal agitation and very slowed response to Rx, and poor ADL, application for patient to return to [facility] is being completed.  [facility] , Caden Berger to be made aware.

## 2019-05-02 PROCEDURE — 99232 SBSQ HOSP IP/OBS MODERATE 35: CPT

## 2019-05-02 RX ORDER — CLONAZEPAM 1 MG
0.25 TABLET ORAL
Qty: 0 | Refills: 0 | Status: DISCONTINUED | OUTPATIENT
Start: 2019-05-02 | End: 2019-05-08

## 2019-05-02 RX ORDER — CLONAZEPAM 1 MG
1 TABLET ORAL AT BEDTIME
Qty: 0 | Refills: 0 | Status: DISCONTINUED | OUTPATIENT
Start: 2019-05-02 | End: 2019-05-08

## 2019-05-02 RX ORDER — ATROPINE SULFATE 1 %
1 DROPS OPHTHALMIC (EYE) EVERY 4 HOURS
Qty: 0 | Refills: 0 | Status: DISCONTINUED | OUTPATIENT
Start: 2019-05-02 | End: 2019-06-20

## 2019-05-02 RX ADMIN — Medication 0.25 MILLIGRAM(S): at 13:21

## 2019-05-02 RX ADMIN — Medication 1 DROP(S): at 13:22

## 2019-05-02 RX ADMIN — Medication 50 MILLIGRAM(S): at 21:50

## 2019-05-02 RX ADMIN — Medication 25 MILLIGRAM(S): at 08:09

## 2019-05-02 RX ADMIN — RISPERIDONE 2 MILLIGRAM(S): 4 TABLET ORAL at 01:28

## 2019-05-02 RX ADMIN — Medication 1 MILLIGRAM(S): at 01:27

## 2019-05-02 RX ADMIN — Medication 0.25 MILLIGRAM(S): at 08:09

## 2019-05-02 RX ADMIN — RISPERIDONE 2 MILLIGRAM(S): 4 TABLET ORAL at 08:09

## 2019-05-02 RX ADMIN — Medication 1 MILLIGRAM(S): at 20:33

## 2019-05-02 RX ADMIN — RISPERIDONE 2 MILLIGRAM(S): 4 TABLET ORAL at 20:33

## 2019-05-02 RX ADMIN — Medication 50 MILLIGRAM(S): at 17:14

## 2019-05-02 NOTE — PROGRESS NOTE BEHAVIORAL HEALTH - OTHER
poor dentition, concrete denies any AH or VH, but preoccupied at times older than stated age needs staff assist and encouragement more cooperative today limited tenuous

## 2019-05-02 NOTE — PROGRESS NOTE BEHAVIORAL HEALTH - NSBHFUPINTERVALHXFT_PSY_A_CORE
Patient seen, evaluated and chart reviewed.  Case discussed in tx team review. . No significant interval events are reported.  Patient has not needed prn Haldol and Ativan  for  episodes of verbal agitation.   Patient is somewhat calmer , but continues paranoid.  Patient is able to play simple board games with staff.   Delusional thoughts continue with patient reporting that others are out to get him, but patient is less focused on this.   Patient continues on constant observation, for safety due to reccent angry, irritable mood and current  psychotic sx. He is less irritable,  and more cooperative and responsive to staff direction.   Pt has no SI/HI but continues to have disorganized thoughts and behaviors.  Denies any AH or VH.  No Rx SE or sx TD/EPS are noted or reported except excessive drooling.  Atropine drops SL ordered

## 2019-05-03 PROCEDURE — 99232 SBSQ HOSP IP/OBS MODERATE 35: CPT

## 2019-05-03 RX ADMIN — Medication 0.25 MILLIGRAM(S): at 08:01

## 2019-05-03 RX ADMIN — Medication 1 MILLIGRAM(S): at 20:13

## 2019-05-03 RX ADMIN — Medication 1 DROP(S): at 11:08

## 2019-05-03 RX ADMIN — Medication 25 MILLIGRAM(S): at 08:00

## 2019-05-03 RX ADMIN — Medication 0.25 MILLIGRAM(S): at 13:37

## 2019-05-03 RX ADMIN — RISPERIDONE 2 MILLIGRAM(S): 4 TABLET ORAL at 08:00

## 2019-05-03 RX ADMIN — RISPERIDONE 2 MILLIGRAM(S): 4 TABLET ORAL at 20:13

## 2019-05-03 RX ADMIN — Medication 50 MILLIGRAM(S): at 20:13

## 2019-05-03 RX ADMIN — Medication 50 MILLIGRAM(S): at 17:30

## 2019-05-03 RX ADMIN — HALOPERIDOL DECANOATE 5 MILLIGRAM(S): 100 INJECTION INTRAMUSCULAR at 08:01

## 2019-05-03 RX ADMIN — Medication 2 MILLIGRAM(S): at 12:34

## 2019-05-03 NOTE — PROGRESS NOTE BEHAVIORAL HEALTH - NSBHFUPINTERVALHXFT_PSY_A_CORE
Patient seen, evaluated and chart reviewed.  Case discussed in tx team review. . No significant interval events are reported.  Patient needed prn Haldol and Ativan  for  episode of verbal agitation., but is stable this afternoon.   Patient is somewhat calmer , but continues paranoid.   Delusional thoughts continue with patient reporting that others are looking for him,  and accusing staff of being against him, and cursing at staff at times,  but patient is less focused on this.   Patient continues on constant observation, for safety due to recent angry, irritable mood and current  psychotic sx. He is less irritable,  and more cooperative and responsive to staff direction.   Pt has no SI/HI but continues to have disorganized thoughts and behaviors.  Denies any AH or VH.  No Rx SE or sx TD/EPS are noted or reported except excessive drooling. Atropine gtts are helping to decrease drooling.  Staff to meet with DWAYNE Franco  on 5/10 to begin proceedings for patient to be transferred to [facility]

## 2019-05-03 NOTE — PROGRESS NOTE BEHAVIORAL HEALTH - OTHER
older than stated age needs staff assist and encouragement more cooperative today limited tenuous, verbal agitation at times poor dentition, irritable at times concrete denies any AH or VH, but preoccupied at times

## 2019-05-04 PROCEDURE — 99231 SBSQ HOSP IP/OBS SF/LOW 25: CPT

## 2019-05-04 RX ADMIN — RISPERIDONE 2 MILLIGRAM(S): 4 TABLET ORAL at 20:47

## 2019-05-04 RX ADMIN — Medication 1 MILLIGRAM(S): at 20:47

## 2019-05-04 RX ADMIN — Medication 0.25 MILLIGRAM(S): at 08:53

## 2019-05-04 RX ADMIN — Medication 50 MILLIGRAM(S): at 18:32

## 2019-05-04 RX ADMIN — Medication 25 MILLIGRAM(S): at 08:52

## 2019-05-04 RX ADMIN — Medication 1 EACH: at 22:00

## 2019-05-04 RX ADMIN — RISPERIDONE 2 MILLIGRAM(S): 4 TABLET ORAL at 08:52

## 2019-05-04 RX ADMIN — Medication 0.25 MILLIGRAM(S): at 14:11

## 2019-05-04 NOTE — PROGRESS NOTE BEHAVIORAL HEALTH - NSBHFUPINTERVALHXFT_PSY_A_CORE
Same clinical presentation as per chart/staff; patient has needed both PO and IM PRNs in the last 24 hrs. This morning he took his medications and was in deep sleep when Writer went to see him. He continues to need CO 1;1 for unpredictable, aggressive behavior.

## 2019-05-04 NOTE — PROGRESS NOTE BEHAVIORAL HEALTH - OTHER
n/a - sleeping older than stated age sleeping tenuous, verbal agitation at times as per staff and chart deferred at this time

## 2019-05-04 NOTE — PROGRESS NOTE BEHAVIORAL HEALTH - ORIENTATION OTHER
unable to assess
n/a - sleeping
unable to assess
pt says he is in a hospital and knows his name
unable to assess
pt says he is in a hospital and knows his name
pt says he is in a hospital and knows his name
unable to assess
pt says he is in a hospital and knows his name
unable to assess
pt says he is in a hospital and knows his name
unable to assess
pt says he is in a hospital and knows his name, not oriented to day or date today
pt says he is in a hospital and knows his name
unable to assess
unable to assess
pt says he is in a hospital and knows his name

## 2019-05-05 PROCEDURE — 99231 SBSQ HOSP IP/OBS SF/LOW 25: CPT

## 2019-05-05 RX ADMIN — Medication 0.25 MILLIGRAM(S): at 15:32

## 2019-05-05 RX ADMIN — RISPERIDONE 2 MILLIGRAM(S): 4 TABLET ORAL at 20:16

## 2019-05-05 RX ADMIN — RISPERIDONE 2 MILLIGRAM(S): 4 TABLET ORAL at 08:51

## 2019-05-05 RX ADMIN — Medication 25 MILLIGRAM(S): at 08:51

## 2019-05-05 RX ADMIN — Medication 1 MILLIGRAM(S): at 20:16

## 2019-05-05 RX ADMIN — Medication 1 DROP(S): at 11:51

## 2019-05-05 RX ADMIN — Medication 50 MILLIGRAM(S): at 18:02

## 2019-05-05 RX ADMIN — Medication 0.25 MILLIGRAM(S): at 08:52

## 2019-05-05 NOTE — PROGRESS NOTE BEHAVIORAL HEALTH - NSBHFUPINTERVALHXFT_PSY_A_CORE
Pt was seen, chart reviewed. He is talking to himself and wondering though the hallways.  He continues to need CO 1;1 for unpredictable, aggressive behavior.

## 2019-05-06 VITALS — HEART RATE: 95 BPM | SYSTOLIC BLOOD PRESSURE: 147 MMHG | RESPIRATION RATE: 19 BRPM | DIASTOLIC BLOOD PRESSURE: 94 MMHG

## 2019-05-06 PROCEDURE — 99232 SBSQ HOSP IP/OBS MODERATE 35: CPT

## 2019-05-06 RX ORDER — BENZTROPINE MESYLATE 1 MG
1 TABLET ORAL
Qty: 0 | Refills: 0 | Status: DISCONTINUED | OUTPATIENT
Start: 2019-05-06 | End: 2019-06-20

## 2019-05-06 RX ORDER — BENZTROPINE MESYLATE 1 MG
1 TABLET ORAL ONCE
Qty: 0 | Refills: 0 | Status: COMPLETED | OUTPATIENT
Start: 2019-05-06 | End: 2019-05-06

## 2019-05-06 RX ADMIN — Medication 1 MILLIGRAM(S): at 20:13

## 2019-05-06 RX ADMIN — Medication 1 MILLIGRAM(S): at 12:22

## 2019-05-06 RX ADMIN — RISPERIDONE 2 MILLIGRAM(S): 4 TABLET ORAL at 20:13

## 2019-05-06 RX ADMIN — Medication 0.25 MILLIGRAM(S): at 13:41

## 2019-05-06 RX ADMIN — Medication 50 MILLIGRAM(S): at 20:13

## 2019-05-06 RX ADMIN — Medication 25 MILLIGRAM(S): at 08:50

## 2019-05-06 RX ADMIN — Medication 0.25 MILLIGRAM(S): at 08:51

## 2019-05-06 RX ADMIN — RISPERIDONE 2 MILLIGRAM(S): 4 TABLET ORAL at 08:50

## 2019-05-06 NOTE — PROGRESS NOTE BEHAVIORAL HEALTH - NSBHFUPINTERVALHXFT_PSY_A_CORE
Pt was seen and evaluated.  Chart reviewed and case discussed in tx team meeting.  No significant interval events are reported, except patient had been reported to have improved behavioral control, and Constant Obs was DC.  Patient then began wandering in the shields, hyperverbal, with disorganized speech, threatening staff.  Constant Obs resumed while awake.  Patient continues to have inconsistent behaviors, and psychosis.  Patient denies any SI, HI or AH or VH.  No Rx SE are noted or reported except bilateral hand tremors.  No cogwheeling or vermiform tongue movements are noted. Cogentin changed to 1mg bid, from prn.

## 2019-05-06 NOTE — PROGRESS NOTE BEHAVIORAL HEALTH - OTHER
older than stated age had haircut and shave tenuous, verbal agitation at times as per staff and chart poor dentition

## 2019-05-07 PROCEDURE — 99232 SBSQ HOSP IP/OBS MODERATE 35: CPT

## 2019-05-07 RX ADMIN — Medication 0.25 MILLIGRAM(S): at 08:16

## 2019-05-07 RX ADMIN — Medication 50 MILLIGRAM(S): at 17:30

## 2019-05-07 RX ADMIN — RISPERIDONE 2 MILLIGRAM(S): 4 TABLET ORAL at 20:28

## 2019-05-07 RX ADMIN — Medication 1 MILLIGRAM(S): at 20:28

## 2019-05-07 RX ADMIN — Medication 1 DROP(S): at 11:37

## 2019-05-07 RX ADMIN — Medication 1 MILLIGRAM(S): at 08:16

## 2019-05-07 RX ADMIN — Medication 25 MILLIGRAM(S): at 08:16

## 2019-05-07 RX ADMIN — Medication 0.25 MILLIGRAM(S): at 13:33

## 2019-05-07 RX ADMIN — RISPERIDONE 2 MILLIGRAM(S): 4 TABLET ORAL at 08:16

## 2019-05-07 NOTE — PROGRESS NOTE BEHAVIORAL HEALTH - NSBHFUPINTERVALHXFT_PSY_A_CORE
Pt was seen and evaluated.  Chart reviewed and case discussed in tx team meeting.  No significant interval events are reported, except patient had episodes of verbal agitation during the night, so constant obs resumed while awake and asleep.   Patient continues to have inconsistent behaviors, and psychosis.  Patient denies any SI, HI or AH or VH.  No Rx SE are noted or reported except bilateral hand tremors. Patient reports "I always had my hands moving.  It is nothing" Patient is hyperverbal, much of what he verbalizes is disorganized and psychotic.

## 2019-05-08 LAB
ALBUMIN SERPL ELPH-MCNC: 3.2 G/DL — LOW (ref 3.3–5)
ALP SERPL-CCNC: 82 U/L — SIGNIFICANT CHANGE UP (ref 30–120)
ALT FLD-CCNC: 66 U/L DA — HIGH (ref 10–60)
ANION GAP SERPL CALC-SCNC: 6 MMOL/L — SIGNIFICANT CHANGE UP (ref 5–17)
AST SERPL-CCNC: 30 U/L — SIGNIFICANT CHANGE UP (ref 10–40)
BILIRUB SERPL-MCNC: 0.5 MG/DL — SIGNIFICANT CHANGE UP (ref 0.2–1.2)
BUN SERPL-MCNC: 22 MG/DL — SIGNIFICANT CHANGE UP (ref 7–23)
CALCIUM SERPL-MCNC: 9 MG/DL — SIGNIFICANT CHANGE UP (ref 8.4–10.5)
CHLORIDE SERPL-SCNC: 101 MMOL/L — SIGNIFICANT CHANGE UP (ref 96–108)
CO2 SERPL-SCNC: 27 MMOL/L — SIGNIFICANT CHANGE UP (ref 22–31)
CREAT SERPL-MCNC: 0.63 MG/DL — SIGNIFICANT CHANGE UP (ref 0.5–1.3)
GLUCOSE SERPL-MCNC: 111 MG/DL — HIGH (ref 70–99)
HCT VFR BLD CALC: 43.5 % — SIGNIFICANT CHANGE UP (ref 39–50)
HGB BLD-MCNC: 15 G/DL — SIGNIFICANT CHANGE UP (ref 13–17)
MCHC RBC-ENTMCNC: 32.3 PG — SIGNIFICANT CHANGE UP (ref 27–34)
MCHC RBC-ENTMCNC: 34.5 GM/DL — SIGNIFICANT CHANGE UP (ref 32–36)
MCV RBC AUTO: 93.8 FL — SIGNIFICANT CHANGE UP (ref 80–100)
NRBC # BLD: 0 /100 WBCS — SIGNIFICANT CHANGE UP (ref 0–0)
PLATELET # BLD AUTO: 291 K/UL — SIGNIFICANT CHANGE UP (ref 150–400)
POTASSIUM SERPL-MCNC: 4.6 MMOL/L — SIGNIFICANT CHANGE UP (ref 3.5–5.3)
POTASSIUM SERPL-SCNC: 4.6 MMOL/L — SIGNIFICANT CHANGE UP (ref 3.5–5.3)
PROT SERPL-MCNC: 6.2 G/DL — SIGNIFICANT CHANGE UP (ref 6–8.3)
RBC # BLD: 4.64 M/UL — SIGNIFICANT CHANGE UP (ref 4.2–5.8)
RBC # FLD: 11.9 % — SIGNIFICANT CHANGE UP (ref 10.3–14.5)
SODIUM SERPL-SCNC: 134 MMOL/L — LOW (ref 135–145)
WBC # BLD: 7.26 K/UL — SIGNIFICANT CHANGE UP (ref 3.8–10.5)
WBC # FLD AUTO: 7.26 K/UL — SIGNIFICANT CHANGE UP (ref 3.8–10.5)

## 2019-05-08 PROCEDURE — 99232 SBSQ HOSP IP/OBS MODERATE 35: CPT

## 2019-05-08 RX ORDER — CLONAZEPAM 1 MG
1 TABLET ORAL AT BEDTIME
Qty: 0 | Refills: 0 | Status: DISCONTINUED | OUTPATIENT
Start: 2019-05-08 | End: 2019-05-14

## 2019-05-08 RX ORDER — CLONAZEPAM 1 MG
0.25 TABLET ORAL
Qty: 0 | Refills: 0 | Status: DISCONTINUED | OUTPATIENT
Start: 2019-05-08 | End: 2019-05-14

## 2019-05-08 RX ADMIN — RISPERIDONE 2 MILLIGRAM(S): 4 TABLET ORAL at 08:25

## 2019-05-08 RX ADMIN — Medication 0.25 MILLIGRAM(S): at 08:25

## 2019-05-08 RX ADMIN — Medication 50 MILLIGRAM(S): at 22:16

## 2019-05-08 RX ADMIN — Medication 1 MILLIGRAM(S): at 08:25

## 2019-05-08 RX ADMIN — Medication 1 MILLIGRAM(S): at 20:44

## 2019-05-08 RX ADMIN — RISPERIDONE 2 MILLIGRAM(S): 4 TABLET ORAL at 20:45

## 2019-05-08 RX ADMIN — Medication 50 MILLIGRAM(S): at 17:23

## 2019-05-08 RX ADMIN — Medication 25 MILLIGRAM(S): at 08:25

## 2019-05-08 RX ADMIN — Medication 0.25 MILLIGRAM(S): at 13:14

## 2019-05-08 NOTE — PROGRESS NOTE BEHAVIORAL HEALTH - OTHER
older than stated age had haircut and shave was done tenuous, verbal agitation at times hand tremors less on cogentin poor dentition, hyperverbal at times

## 2019-05-08 NOTE — PROGRESS NOTE BEHAVIORAL HEALTH - NSBHFUPINTERVALHXFT_PSY_A_CORE
Pt was seen and evaluated.  Chart reviewed and case discussed in tx team meeting.  No significant interval events are reported, except patient continues with episodes of verbal agitation, some threatening of staff.   Patient continues to have inconsistent behaviors, labile mood  and psychosis.  Patient expresses delusional thoughts re: the staff being against him" Patient denies any SI, HI or AH or VH.  No Rx SE are noted or reported except occasional hand tremors." Patient is hyperverbal, much of what he verbalizes is disorganized and psychotic. Na of 5/8 noted to be 134,  discussed with , Dr. Roberson.  I and O begun to monitor for drinking too much water.  BMP ordered for 5/9

## 2019-05-09 LAB
ANION GAP SERPL CALC-SCNC: 5 MMOL/L — SIGNIFICANT CHANGE UP (ref 5–17)
BUN SERPL-MCNC: 23 MG/DL — SIGNIFICANT CHANGE UP (ref 7–23)
CALCIUM SERPL-MCNC: 9.3 MG/DL — SIGNIFICANT CHANGE UP (ref 8.4–10.5)
CHLORIDE SERPL-SCNC: 101 MMOL/L — SIGNIFICANT CHANGE UP (ref 96–108)
CO2 SERPL-SCNC: 28 MMOL/L — SIGNIFICANT CHANGE UP (ref 22–31)
CREAT SERPL-MCNC: 0.81 MG/DL — SIGNIFICANT CHANGE UP (ref 0.5–1.3)
GLUCOSE SERPL-MCNC: 150 MG/DL — HIGH (ref 70–99)
POTASSIUM SERPL-MCNC: 4.4 MMOL/L — SIGNIFICANT CHANGE UP (ref 3.5–5.3)
POTASSIUM SERPL-SCNC: 4.4 MMOL/L — SIGNIFICANT CHANGE UP (ref 3.5–5.3)
SODIUM SERPL-SCNC: 134 MMOL/L — LOW (ref 135–145)

## 2019-05-09 PROCEDURE — 99232 SBSQ HOSP IP/OBS MODERATE 35: CPT

## 2019-05-09 RX ADMIN — Medication 1 MILLIGRAM(S): at 21:40

## 2019-05-09 RX ADMIN — Medication 50 MILLIGRAM(S): at 21:42

## 2019-05-09 RX ADMIN — Medication 1 DROP(S): at 17:41

## 2019-05-09 RX ADMIN — Medication 0.25 MILLIGRAM(S): at 14:06

## 2019-05-09 RX ADMIN — Medication 0.25 MILLIGRAM(S): at 08:33

## 2019-05-09 RX ADMIN — Medication 50 MILLIGRAM(S): at 17:41

## 2019-05-09 RX ADMIN — RISPERIDONE 2 MILLIGRAM(S): 4 TABLET ORAL at 21:41

## 2019-05-09 RX ADMIN — RISPERIDONE 2 MILLIGRAM(S): 4 TABLET ORAL at 08:33

## 2019-05-09 RX ADMIN — Medication 25 MILLIGRAM(S): at 08:33

## 2019-05-09 RX ADMIN — Medication 1 MILLIGRAM(S): at 08:33

## 2019-05-09 NOTE — PROGRESS NOTE BEHAVIORAL HEALTH - NSBHFUPINTERVALHXFT_PSY_A_CORE
Pt was seen and evaluated.  Chart reviewed and case discussed in tx team meeting.  No significant interval events are reported, except patient continues with occasiional episodes of verbal agitation.   Patient continues to have inconsistent behaviors, labile mood  and psychosis.  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  No Rx SE are noted or reported except occasional hand tremors." Patient is hyperverbal, much of what he verbalizes is disorganized and psychotic. Na of 5/8 noted to be 134, again today, again  discussed with , Dr. Roberson.  will continue to monitor for drinking too much water  BMP ordered for 5/10.  Patient reports he is eating and sleeping well Pt was seen and evaluated.  Chart reviewed and case discussed in tx team meeting.  No significant interval events are reported, except patient continues with occasional episodes of verbal agitation, and is masturbating in his room today.  Patient continues to have inconsistent behaviors, labile mood  and psychosis.  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  Patient denies any SI, HI or AH or VH.  No Rx SE are noted or reported except occasional hand tremors." Patient is hyperverbal, much of what he verbalizes is disorganized and psychotic. Na of 5/8 noted to be 134, again today, again  discussed with , Dr. Roberson.  will continue to monitor for drinking too much water  BMP ordered for 5/10.  Patient reports he is eating and sleeping well

## 2019-05-09 NOTE — PROGRESS NOTE BEHAVIORAL HEALTH - OTHER
older than stated age had haircut and shave was done tenuous, verbal agitation at times no hand tremors noted today poor dentition, hyperverbal at times

## 2019-05-10 LAB
ANION GAP SERPL CALC-SCNC: 6 MMOL/L — SIGNIFICANT CHANGE UP (ref 5–17)
BUN SERPL-MCNC: 23 MG/DL — SIGNIFICANT CHANGE UP (ref 7–23)
CALCIUM SERPL-MCNC: 9.1 MG/DL — SIGNIFICANT CHANGE UP (ref 8.4–10.5)
CHLORIDE SERPL-SCNC: 102 MMOL/L — SIGNIFICANT CHANGE UP (ref 96–108)
CO2 SERPL-SCNC: 28 MMOL/L — SIGNIFICANT CHANGE UP (ref 22–31)
CREAT SERPL-MCNC: 0.75 MG/DL — SIGNIFICANT CHANGE UP (ref 0.5–1.3)
GLUCOSE SERPL-MCNC: 110 MG/DL — HIGH (ref 70–99)
POTASSIUM SERPL-MCNC: 4.4 MMOL/L — SIGNIFICANT CHANGE UP (ref 3.5–5.3)
POTASSIUM SERPL-SCNC: 4.4 MMOL/L — SIGNIFICANT CHANGE UP (ref 3.5–5.3)
SODIUM SERPL-SCNC: 136 MMOL/L — SIGNIFICANT CHANGE UP (ref 135–145)

## 2019-05-10 PROCEDURE — 99233 SBSQ HOSP IP/OBS HIGH 50: CPT

## 2019-05-10 RX ADMIN — Medication 50 MILLIGRAM(S): at 17:35

## 2019-05-10 RX ADMIN — HALOPERIDOL DECANOATE 5 MILLIGRAM(S): 100 INJECTION INTRAMUSCULAR at 16:53

## 2019-05-10 RX ADMIN — Medication 1 MILLIGRAM(S): at 08:54

## 2019-05-10 RX ADMIN — Medication 25 MILLIGRAM(S): at 08:54

## 2019-05-10 RX ADMIN — Medication 50 MILLIGRAM(S): at 20:46

## 2019-05-10 RX ADMIN — Medication 0.25 MILLIGRAM(S): at 13:44

## 2019-05-10 RX ADMIN — RISPERIDONE 2 MILLIGRAM(S): 4 TABLET ORAL at 20:46

## 2019-05-10 RX ADMIN — Medication 1 MILLIGRAM(S): at 20:46

## 2019-05-10 RX ADMIN — Medication 2 MILLIGRAM(S): at 16:53

## 2019-05-10 RX ADMIN — Medication 0.25 MILLIGRAM(S): at 08:54

## 2019-05-10 RX ADMIN — RISPERIDONE 2 MILLIGRAM(S): 4 TABLET ORAL at 08:54

## 2019-05-10 NOTE — PROGRESS NOTE BEHAVIORAL HEALTH - OTHER
keeps eyes closed while talking tenuous, verbal agitation at times poor dentition, hyperverbal at times older than stated age had haircut and shave was done

## 2019-05-10 NOTE — PROGRESS NOTE BEHAVIORAL HEALTH - NSBHFUPINTERVALHXFT_PSY_A_CORE
Pt was seen, evaluated and chart reviewed.  Administrative hearing took place today with the patient, Dr. Moreiar, April JosephineIsmael, patient's sister and the undersigned. Patient presents as unpredictable, psychotic and agitated. He shows no insight into his problem or options for treatment. The current plan is to transfer patient to long-term hospital, as patient continues to be symptomatic, despite the current treatment. Patient reports he is eating and sleeping well but is unable to care for self.

## 2019-05-10 NOTE — PROGRESS NOTE BEHAVIORAL HEALTH - OTHER
older than stated age had haircut and shave was done tenuous, verbal agitation at times poor dentition, hyperverbal at times

## 2019-05-10 NOTE — PROGRESS NOTE BEHAVIORAL HEALTH - NSBHFUPINTERVALHXFT_PSY_A_CORE
Pt was seen and evaluated.  Chart reviewed and case discussed in tx team meeting.  No significant interval events are reported, except patient continues with occasional episodes of verbal agitation, and  masturbating in his room.   Patient continues to have inconsistent behaviors, labile mood  and psychosis.  Patient expresses delusional thoughts re: paranoid about people on the unit, and reports his daughter lives with him in the [facility] residence and that she is sending a cab to get him.   Patient denies any SI, HI or AH or VH.  No Rx SE are noted or reported.  No hand tremors are noted today.  Patient is hyperverbal, much of what he verbalizes is disorganized and psychotic. Na of 5/10 is 136.  Staff reports patient is not over drinking  water  Patient reports he is eating and sleeping well. Pt was seen and evaluated.  Chart reviewed and case discussed in tx team meeting.  No significant interval events are reported, except patient continues with occasional episodes of verbal agitation, and  masturbating in his room.   Patient continues to have inconsistent behaviors, labile mood  and psychosis.  Patient expresses delusional thoughts re: paranoid about people on the unit, and reports his daughter lives with him in the [facility] residence and that she is sending a cab to get him.   Patient denies any SI, HI or AH or VH.  No Rx SE are noted or reported.  No hand tremors are noted today.  Patient is hyperverbal, much of what he verbalizes is disorganized and psychotic. Na of 5/10 is 136.  Staff reports patient is not over drinking  water  Patient reports he is eating and sleeping well.  Met with patient, his sister, Dr. Landon, Dr Moreira and Caden lewis.  Patient was hyperverbal, disorganized and psychotic.

## 2019-05-11 DIAGNOSIS — I10 ESSENTIAL (PRIMARY) HYPERTENSION: ICD-10-CM

## 2019-05-11 PROCEDURE — 12345: CPT | Mod: NC

## 2019-05-11 PROCEDURE — 99231 SBSQ HOSP IP/OBS SF/LOW 25: CPT

## 2019-05-11 RX ORDER — METOPROLOL TARTRATE 50 MG
50 TABLET ORAL DAILY
Refills: 0 | Status: DISCONTINUED | OUTPATIENT
Start: 2019-05-11 | End: 2019-06-20

## 2019-05-11 RX ADMIN — RISPERIDONE 2 MILLIGRAM(S): 4 TABLET ORAL at 22:05

## 2019-05-11 RX ADMIN — RISPERIDONE 2 MILLIGRAM(S): 4 TABLET ORAL at 08:54

## 2019-05-11 RX ADMIN — Medication 0.25 MILLIGRAM(S): at 14:22

## 2019-05-11 RX ADMIN — Medication 2 MILLIGRAM(S): at 23:04

## 2019-05-11 RX ADMIN — Medication 0.25 MILLIGRAM(S): at 08:54

## 2019-05-11 RX ADMIN — Medication 25 MILLIGRAM(S): at 08:54

## 2019-05-11 RX ADMIN — Medication 1 MILLIGRAM(S): at 08:54

## 2019-05-11 RX ADMIN — Medication 1 MILLIGRAM(S): at 22:09

## 2019-05-11 RX ADMIN — Medication 1 MILLIGRAM(S): at 22:05

## 2019-05-11 RX ADMIN — Medication 50 MILLIGRAM(S): at 22:05

## 2019-05-11 NOTE — PROGRESS NOTE ADULT - PROBLEM SELECTOR PLAN 1
Will increase patient on metoprolol TO 50 MG Daily with holding parameters.  Monitor blood pressure per routine.  Patient is encouraged to watch his salt intake.

## 2019-05-11 NOTE — PROGRESS NOTE ADULT - ASSESSMENT
63-year-old male with history of schizoaffective disorder and hypertension who is admitted for stabilization of his mood.  Patient is being seen for elevated blood pressure and intermittent tachycardia.

## 2019-05-11 NOTE — PROGRESS NOTE ADULT - SUBJECTIVE AND OBJECTIVE BOX
Patient is a 63y old  Male who presents with a chief complaint of Agitation.  HPI: 63-year-old male with history of for schizoaffective disorder and hypertension, who was admitted secondary to agitation and impulse control.  Patient is being followed for medical management.    INTERVAL HPI/OVERNIGHT EVENTS:  Chart reviewed, notes reviewed.   Patient seen and examined.    I was asked to see the patient as patient has been running elevated blood pressure and heart rate.  Patient has been on metoprolol without much improvement.  Patient denies any chest pain or chest pressure.  He denies any nausea or vomiting.  Denies any orthopnea or PND.  Denies any fevers or chills.    REVIEW OF SYSTEMS:  Review system is as per HPI, rest is negative.    Allergies    Mellaril (Unknown)  penicillin (Unknown)  Stelazine (Unknown)    Intolerances    MEDICATIONS  (STANDING):  benztropine 1 milliGRAM(s) Oral two times a day  clonazePAM  Tablet 0.25 milliGRAM(s) Oral <User Schedule>  clonazePAM  Tablet 1 milliGRAM(s) Oral at bedtime  metoprolol succinate ER 50 milliGRAM(s) Oral daily  risperiDONE   Tablet 2 milliGRAM(s) Oral two times a day  traZODone 50 milliGRAM(s) Oral <User Schedule>    MEDICATIONS  (PRN):  atropine 1% Ophthalmic Solution for SubLingual Use 1 Drop(s) SubLingual every 4 hours PRN excessive drooling  guaiFENesin   Syrup  (Sugar-Free) 100 milliGRAM(s) Oral every 6 hours PRN Cough  haloperidol     Tablet 5 milliGRAM(s) Oral every 6 hours PRN agitation  haloperidol    Injectable 5 milliGRAM(s) IntraMuscular every 6 hours PRN agitation  LORazepam     Tablet 2 milliGRAM(s) Oral every 6 hours PRN Agitation  LORazepam   Injectable 2 milliGRAM(s) IntraMuscular every 6 hours PRN Agitation  magnesium hydroxide Suspension 30 milliLiter(s) Oral daily PRN Constipation  nicotine - Inhaler 1 Each Inhalation every 4 hours PRN nicotine cravings  polyethylene glycol 3350 17 Gram(s) Oral daily PRN Constipation  traZODone 50 milliGRAM(s) Oral at bedtime PRN insomnia    Vital Signs Last 24 Hrs  T(C): --  T(F): --  HR: 98 (11 May 2019 16:16) (98 - 98)  BP: 159/85 (11 May 2019 16:16) (159/85 - 159/85)  BP(mean): --  RR: 19 (11 May 2019 16:16) (19 - 19)  SpO2: --    PHYSICAL EXAM:  GENERAL: NAD, well-groomed, well-developed  HEAD:  Atraumatic, Normocephalic  EYES: EOMI, PERRLA, conjunctiva and sclera clear  ENMT: Moist mucous membranes, No lesions  NECK: Supple,  CHEST/LUNG: Clear to auscultation bilaterally; No rales, rhonchi, wheezing, or rubs  HEART: Regular rate and rhythm; No murmurs, rubs, or gallops  ABDOMEN: Soft, Nontender, Nondistended; Bowel sounds present  EXTREMITIES:  2+ Peripheral Pulses, No clubbing, cyanosis, or edema  MS: No joint swelling or deformity.   LYMPH: No lymphadenopathy noted  SKIN: No rashes or lesions  NERVOUS SYSTEM: No focal deficit is noted.  PSYCH:  Awake and alert..    LABS:     10 May 2019 08:19    136    |  102    |  23     ----------------------------<  110    4.4     |  28     |  0.75     Ca    9.1        10 May 2019 08:19      RADIOLOGY TEST: (IMAGES REVIEWED BY ME)    Imaging Personally Reviewed:  [ ] YES        HEALTH ISSUES - PROBLEM Dx:  Residual schizophrenia

## 2019-05-11 NOTE — PROGRESS NOTE BEHAVIORAL HEALTH - NSBHFUPINTERVALHXFT_PSY_A_CORE
Pt was seen, evaluated and chart reviewed.  Patient seen, and on 1:1 for disorganized behavior, and endorsing that he lives [facility] and [place] not have to go [place] [facility]. He was upset when he was mentioned that we need to send him to [facility] for further treatment. Yesterday writer spoke in front of Dr. Landon, HERMINIA Chen and patient's sister, including the patient that he needs to go to [facility] for continued treatment as things are not improving and his sister also feels the same that he was so good in the past but for last 1-2 years he was not able to come back to recovery, and has stopped taking all his meds before being hospitalized at [facility]. He is also verbally abusive, cursing people and staffs etc. he attributes his tremors to have instincts, notable Rt. hand Tremors.

## 2019-05-12 PROCEDURE — 99231 SBSQ HOSP IP/OBS SF/LOW 25: CPT

## 2019-05-12 RX ADMIN — Medication 1 MILLIGRAM(S): at 20:55

## 2019-05-12 RX ADMIN — Medication 1 MILLIGRAM(S): at 08:47

## 2019-05-12 RX ADMIN — RISPERIDONE 2 MILLIGRAM(S): 4 TABLET ORAL at 08:47

## 2019-05-12 RX ADMIN — RISPERIDONE 2 MILLIGRAM(S): 4 TABLET ORAL at 20:55

## 2019-05-12 RX ADMIN — Medication 0.25 MILLIGRAM(S): at 08:47

## 2019-05-12 RX ADMIN — Medication 0.25 MILLIGRAM(S): at 13:23

## 2019-05-12 NOTE — PROGRESS NOTE BEHAVIORAL HEALTH - NSBHFUPINTERVALHXFT_PSY_A_CORE
Pt was seen, evaluated and chart reviewed.  Patient in bed, naked and was sleeping. No acute issues, but remains disorganized, with inappropriate behavior, and verbal abuse towards staffs. Did not masturbate for few days as per 1:1 . Compliant with meds, and continues to need 1:1 for disorganization. 1:1 renewed.

## 2019-05-13 PROCEDURE — 99232 SBSQ HOSP IP/OBS MODERATE 35: CPT

## 2019-05-13 RX ADMIN — Medication 1 MILLIGRAM(S): at 11:30

## 2019-05-13 RX ADMIN — Medication 50 MILLIGRAM(S): at 18:25

## 2019-05-13 RX ADMIN — RISPERIDONE 2 MILLIGRAM(S): 4 TABLET ORAL at 20:21

## 2019-05-13 RX ADMIN — Medication 0.25 MILLIGRAM(S): at 14:57

## 2019-05-13 RX ADMIN — Medication 0.25 MILLIGRAM(S): at 11:30

## 2019-05-13 RX ADMIN — Medication 1 MILLIGRAM(S): at 20:21

## 2019-05-13 RX ADMIN — RISPERIDONE 2 MILLIGRAM(S): 4 TABLET ORAL at 11:30

## 2019-05-13 RX ADMIN — Medication 50 MILLIGRAM(S): at 11:29

## 2019-05-13 NOTE — PROGRESS NOTE BEHAVIORAL HEALTH - NSBHFUPINTERVALHXFT_PSY_A_CORE
Pt was seen, evaluated and chart reviewed.  Chart reviewed and case discussed in tx team meeting.  No significant interval events are reported except patient in bed, naked.  When asked to put on clothing, he demanded this writer's white lab coat. . Patient remains disorganized, with inappropriate behavior, and verbal abuse towards staffs.  Patient is labile, angry and threatening at times, pleasant at other times. Reports he wants to return [facility] because his daughter lives there (Patient has not children per his sister)  Compliant with meds, and continues to need 1:1 for disorganization, verbal agitation.  Denies any SI, HI, AH or VH.  No Rx SE or sx TD/EPS are noted or reported.

## 2019-05-13 NOTE — PROGRESS NOTE BEHAVIORAL HEALTH - OTHER
older than stated age had haircut and shave hostile and uncooperative at times tenuous, verbal agitation at times poor dentition, hyperverbal at times

## 2019-05-14 LAB
ANION GAP SERPL CALC-SCNC: 6 MMOL/L — SIGNIFICANT CHANGE UP (ref 5–17)
BUN SERPL-MCNC: 29 MG/DL — HIGH (ref 7–23)
CALCIUM SERPL-MCNC: 8.8 MG/DL — SIGNIFICANT CHANGE UP (ref 8.4–10.5)
CHLORIDE SERPL-SCNC: 101 MMOL/L — SIGNIFICANT CHANGE UP (ref 96–108)
CO2 SERPL-SCNC: 28 MMOL/L — SIGNIFICANT CHANGE UP (ref 22–31)
CREAT SERPL-MCNC: 0.79 MG/DL — SIGNIFICANT CHANGE UP (ref 0.5–1.3)
GLUCOSE SERPL-MCNC: 186 MG/DL — HIGH (ref 70–99)
POTASSIUM SERPL-MCNC: 4.5 MMOL/L — SIGNIFICANT CHANGE UP (ref 3.5–5.3)
POTASSIUM SERPL-SCNC: 4.5 MMOL/L — SIGNIFICANT CHANGE UP (ref 3.5–5.3)
SODIUM SERPL-SCNC: 135 MMOL/L — SIGNIFICANT CHANGE UP (ref 135–145)

## 2019-05-14 PROCEDURE — 99232 SBSQ HOSP IP/OBS MODERATE 35: CPT

## 2019-05-14 RX ORDER — CLONAZEPAM 1 MG
0.25 TABLET ORAL
Refills: 0 | Status: DISCONTINUED | OUTPATIENT
Start: 2019-05-14 | End: 2019-05-20

## 2019-05-14 RX ORDER — CLONAZEPAM 1 MG
1 TABLET ORAL AT BEDTIME
Refills: 0 | Status: DISCONTINUED | OUTPATIENT
Start: 2019-05-14 | End: 2019-05-20

## 2019-05-14 RX ORDER — PALIPERIDONE 1.5 MG/1
156 TABLET, EXTENDED RELEASE ORAL ONCE
Refills: 0 | Status: COMPLETED | OUTPATIENT
Start: 2019-06-11 | End: 2019-06-11

## 2019-05-14 RX ADMIN — RISPERIDONE 2 MILLIGRAM(S): 4 TABLET ORAL at 20:38

## 2019-05-14 RX ADMIN — Medication 0.25 MILLIGRAM(S): at 09:38

## 2019-05-14 RX ADMIN — Medication 50 MILLIGRAM(S): at 20:37

## 2019-05-14 RX ADMIN — Medication 1 MILLIGRAM(S): at 09:38

## 2019-05-14 RX ADMIN — Medication 50 MILLIGRAM(S): at 17:35

## 2019-05-14 RX ADMIN — RISPERIDONE 2 MILLIGRAM(S): 4 TABLET ORAL at 09:38

## 2019-05-14 RX ADMIN — Medication 0.25 MILLIGRAM(S): at 13:13

## 2019-05-14 RX ADMIN — PALIPERIDONE 156 MILLIGRAM(S): 1.5 TABLET, EXTENDED RELEASE ORAL at 18:19

## 2019-05-14 RX ADMIN — Medication 1 MILLIGRAM(S): at 20:37

## 2019-05-14 RX ADMIN — Medication 1 MILLIGRAM(S): at 20:38

## 2019-05-14 RX ADMIN — Medication 50 MILLIGRAM(S): at 09:38

## 2019-05-14 NOTE — PROGRESS NOTE BEHAVIORAL HEALTH - OTHER
poor dentition, hyperverbal at times older than stated age had haircut and shave tenuous, verbal agitation at times

## 2019-05-14 NOTE — PROGRESS NOTE BEHAVIORAL HEALTH - NSBHFUPINTERVALHXFT_PSY_A_CORE
Pt was seen, evaluated and chart reviewed.  Chart reviewed and case discussed in tx team meeting.  No significant interval events are reported except patient in bed, naked at times, but wears clothing when leaving his room.  . Patient remains disorganized, with inappropriate behavior, but calmer today.   Patient is labile, angry  at times, pleasant at other times. Continues to report that he is a .   Compliant with meds, and continues to need 1:1 for direction and limits. Constant obs changed to while awake only, as patient's behavior is somewhat improved today. .  Denies any SI, HI, AH or VH.  No Rx SE or sx TD/EPS are noted or reported.

## 2019-05-15 LAB — GLUCOSE SERPL-MCNC: 172 MG/DL — HIGH (ref 70–99)

## 2019-05-15 PROCEDURE — 99232 SBSQ HOSP IP/OBS MODERATE 35: CPT

## 2019-05-15 RX ADMIN — Medication 50 MILLIGRAM(S): at 08:12

## 2019-05-15 RX ADMIN — Medication 1 MILLIGRAM(S): at 20:36

## 2019-05-15 RX ADMIN — Medication 0.25 MILLIGRAM(S): at 08:12

## 2019-05-15 RX ADMIN — RISPERIDONE 2 MILLIGRAM(S): 4 TABLET ORAL at 08:12

## 2019-05-15 RX ADMIN — RISPERIDONE 2 MILLIGRAM(S): 4 TABLET ORAL at 20:35

## 2019-05-15 RX ADMIN — Medication 50 MILLIGRAM(S): at 17:45

## 2019-05-15 RX ADMIN — Medication 1 MILLIGRAM(S): at 08:12

## 2019-05-15 RX ADMIN — Medication 0.25 MILLIGRAM(S): at 16:18

## 2019-05-15 NOTE — PROGRESS NOTE BEHAVIORAL HEALTH - OTHER
tenuous, verbal agitation at times poor dentition, hyperverbal at times "alright" older than stated age had haircut and shave

## 2019-05-15 NOTE — PROGRESS NOTE BEHAVIORAL HEALTH - NSBHFUPINTERVALHXFT_PSY_A_CORE
Pt was seen, evaluated and chart reviewed.  Chart reviewed and case discussed in tx team meeting.  No significant interval events are reported except patient in bed, naked at times, but wears clothing when leaving his room.  Patient's birthday was yesterday, he reports he enjoyed it, then made sexual remarks.  . Patient remains disorganized, with inappropriate behavior, but calmer today.   Patient is labile, angry  at times, pleasant at other times. Continues to report that he is a .   Compliant with meds, and continues to need 1:1 for direction and limits. Constant obs changed to while awake only, as patient's behavior is somewhat improved. .  Denies any SI, HI, AH or VH.  No Rx SE or sx TD/EPS are noted or reported. Patient's blood glucose on 5/1446=182 (? ate a lot of cake his sister brought in?)   Repeat blood glucose ordered

## 2019-05-16 PROCEDURE — 99233 SBSQ HOSP IP/OBS HIGH 50: CPT

## 2019-05-16 RX ORDER — HALOPERIDOL DECANOATE 100 MG/ML
5 INJECTION INTRAMUSCULAR ONCE
Refills: 0 | Status: COMPLETED | OUTPATIENT
Start: 2019-05-16 | End: 2019-05-16

## 2019-05-16 RX ADMIN — RISPERIDONE 2 MILLIGRAM(S): 4 TABLET ORAL at 20:22

## 2019-05-16 RX ADMIN — Medication 0.25 MILLIGRAM(S): at 08:20

## 2019-05-16 RX ADMIN — Medication 2 MILLIGRAM(S): at 14:11

## 2019-05-16 RX ADMIN — Medication 1 MILLIGRAM(S): at 08:20

## 2019-05-16 RX ADMIN — RISPERIDONE 2 MILLIGRAM(S): 4 TABLET ORAL at 08:20

## 2019-05-16 RX ADMIN — HALOPERIDOL DECANOATE 5 MILLIGRAM(S): 100 INJECTION INTRAMUSCULAR at 14:11

## 2019-05-16 RX ADMIN — Medication 0.25 MILLIGRAM(S): at 13:10

## 2019-05-16 RX ADMIN — Medication 50 MILLIGRAM(S): at 08:20

## 2019-05-16 RX ADMIN — Medication 1 MILLIGRAM(S): at 20:22

## 2019-05-16 RX ADMIN — Medication 50 MILLIGRAM(S): at 17:32

## 2019-05-16 NOTE — PROGRESS NOTE BEHAVIORAL HEALTH - NSBHFUPINTERVALHXFT_PSY_A_CORE
Pt was seen, evaluated and chart reviewed.  Chart reviewed and case discussed in tx team meeting.  No significant interval events are reported except patient is more verbally agitated today, threatening staff and was not able to respond to verbal support/redirection and refused po prn Haldol and Ativan, so was given Haldol 5mg and Ativan 2mg IM. Patient was cooperative with injection.   . Patient remains disorganized, with inappropriate behavior, and episodes of verbal agitation.   Patient is labile, angry  at times, pleasant at other times. Continues to report that he is a  and now reports he has a cab waiting to take him to [employer].    Compliant with meds, and continues to need 1:1 for direction and limits.. .  Denies any SI, HI, AH or VH.  No Rx SE or sx TD/EPS are noted or reported. Patient's blood glucose on 5/1480=118 (? ate a lot of cake his sister brought in?)  Blood glucose 5/1669=379.  Dr. Roberson saw patient and we discussed case, including patient's psychosis and difficulty complying with dietary restrictions

## 2019-05-16 NOTE — PROGRESS NOTE BEHAVIORAL HEALTH - OTHER
older than stated age had haircut and shave tenuous, verbal agitation at times poor dentition, hyperverbal at times

## 2019-05-17 PROCEDURE — 99232 SBSQ HOSP IP/OBS MODERATE 35: CPT

## 2019-05-17 RX ADMIN — Medication 2 MILLIGRAM(S): at 12:11

## 2019-05-17 RX ADMIN — HALOPERIDOL DECANOATE 5 MILLIGRAM(S): 100 INJECTION INTRAMUSCULAR at 12:12

## 2019-05-17 RX ADMIN — Medication 1 MILLIGRAM(S): at 23:54

## 2019-05-17 RX ADMIN — Medication 1 MILLIGRAM(S): at 23:53

## 2019-05-17 RX ADMIN — RISPERIDONE 2 MILLIGRAM(S): 4 TABLET ORAL at 08:45

## 2019-05-17 RX ADMIN — Medication 50 MILLIGRAM(S): at 18:20

## 2019-05-17 RX ADMIN — RISPERIDONE 2 MILLIGRAM(S): 4 TABLET ORAL at 23:54

## 2019-05-17 RX ADMIN — Medication 1 MILLIGRAM(S): at 08:44

## 2019-05-17 RX ADMIN — Medication 50 MILLIGRAM(S): at 08:45

## 2019-05-17 RX ADMIN — Medication 0.25 MILLIGRAM(S): at 08:45

## 2019-05-17 RX ADMIN — Medication 0.25 MILLIGRAM(S): at 15:11

## 2019-05-17 NOTE — PROGRESS NOTE BEHAVIORAL HEALTH - OTHER
had haircut and shave tenuous, verbal agitation at times poor dentition, hyperverbal at times older than stated age

## 2019-05-17 NOTE — PROGRESS NOTE BEHAVIORAL HEALTH - NSBHFUPINTERVALHXFT_PSY_A_CORE
Pt was seen, evaluated and chart reviewed.  Chart reviewed and case discussed in tx team meeting.  No significant interval events are reported except patient is more verbally agitated today, but is more able to respond to verbal support and limits. .   . Patient remains disorganized, with inappropriate behavior, and episodes of verbal agitation.   Patient is labile, angry  at times, pleasant at other times.  Patient is paranoid today, and feels others are out to get him, and he needs a ride home.  Compliant with meds, and continues to need 1:1 for direction and limits. Also ordered enhanced supervision for nights. Patient is permitted fresh air walk with 2:1 staff bid due to his repeated requests to be able to walk outside.  .  Denies any SI, HI, AH or VH.  No Rx SE or sx TD/EPS are noted or reported.

## 2019-05-18 PROCEDURE — 99232 SBSQ HOSP IP/OBS MODERATE 35: CPT

## 2019-05-18 RX ADMIN — Medication 0.25 MILLIGRAM(S): at 13:09

## 2019-05-18 RX ADMIN — RISPERIDONE 2 MILLIGRAM(S): 4 TABLET ORAL at 21:32

## 2019-05-18 RX ADMIN — Medication 50 MILLIGRAM(S): at 18:05

## 2019-05-18 RX ADMIN — Medication 50 MILLIGRAM(S): at 08:11

## 2019-05-18 RX ADMIN — RISPERIDONE 2 MILLIGRAM(S): 4 TABLET ORAL at 08:11

## 2019-05-18 RX ADMIN — Medication 1 MILLIGRAM(S): at 21:33

## 2019-05-18 RX ADMIN — Medication 1 MILLIGRAM(S): at 21:32

## 2019-05-18 RX ADMIN — Medication 1 MILLIGRAM(S): at 08:12

## 2019-05-18 RX ADMIN — Medication 0.25 MILLIGRAM(S): at 08:12

## 2019-05-18 NOTE — PROGRESS NOTE BEHAVIORAL HEALTH - NSBHFUPINTERVALHXFT_PSY_A_CORE
Pt was seen, evaluated and chart reviewed.  Chart reviewed. Patient remains disorganized, with inappropriate behavior, and episodes of verbal agitation.   Patient is labile, angry  at times, pleasant at other times.  Patient is paranoid today, and feels others are out to get him, and he needs a ride home.  Compliant with meds, and continues to need 1:1 for direction and limits. Also ordered enhanced supervision for nights. Patient is permitted fresh air walk with 2:1 staff bid due to his repeated requests to be able to walk outside.  .  Denies any SI, HI, AH or VH.  No Rx SE or sx TD/EPS are noted or reported.

## 2019-05-19 PROCEDURE — 99232 SBSQ HOSP IP/OBS MODERATE 35: CPT

## 2019-05-19 RX ORDER — SOD,AMMONIUM,POTASSIUM LACTATE
1 CREAM (GRAM) TOPICAL
Refills: 0 | Status: DISCONTINUED | OUTPATIENT
Start: 2019-05-19 | End: 2019-06-20

## 2019-05-19 RX ADMIN — Medication 0.25 MILLIGRAM(S): at 08:19

## 2019-05-19 RX ADMIN — Medication 1 MILLIGRAM(S): at 08:19

## 2019-05-19 RX ADMIN — Medication 50 MILLIGRAM(S): at 17:16

## 2019-05-19 RX ADMIN — RISPERIDONE 2 MILLIGRAM(S): 4 TABLET ORAL at 08:20

## 2019-05-19 RX ADMIN — Medication 50 MILLIGRAM(S): at 08:20

## 2019-05-19 RX ADMIN — Medication 0.25 MILLIGRAM(S): at 13:52

## 2019-05-20 PROCEDURE — 99232 SBSQ HOSP IP/OBS MODERATE 35: CPT

## 2019-05-20 RX ORDER — CLONAZEPAM 1 MG
0.5 TABLET ORAL DAILY
Refills: 0 | Status: DISCONTINUED | OUTPATIENT
Start: 2019-05-20 | End: 2019-05-24

## 2019-05-20 RX ORDER — CLONAZEPAM 1 MG
0.25 TABLET ORAL
Refills: 0 | Status: DISCONTINUED | OUTPATIENT
Start: 2019-05-20 | End: 2019-05-20

## 2019-05-20 RX ORDER — CLONAZEPAM 1 MG
1 TABLET ORAL AT BEDTIME
Refills: 0 | Status: DISCONTINUED | OUTPATIENT
Start: 2019-05-20 | End: 2019-05-24

## 2019-05-20 RX ADMIN — Medication 0.25 MILLIGRAM(S): at 09:50

## 2019-05-20 RX ADMIN — RISPERIDONE 2 MILLIGRAM(S): 4 TABLET ORAL at 21:13

## 2019-05-20 RX ADMIN — Medication 1 MILLIGRAM(S): at 09:47

## 2019-05-20 RX ADMIN — Medication 50 MILLIGRAM(S): at 17:20

## 2019-05-20 RX ADMIN — Medication 0.25 MILLIGRAM(S): at 13:41

## 2019-05-20 RX ADMIN — RISPERIDONE 2 MILLIGRAM(S): 4 TABLET ORAL at 09:47

## 2019-05-20 RX ADMIN — Medication 1 MILLIGRAM(S): at 03:51

## 2019-05-20 RX ADMIN — RISPERIDONE 2 MILLIGRAM(S): 4 TABLET ORAL at 03:52

## 2019-05-20 RX ADMIN — Medication 1 MILLIGRAM(S): at 21:13

## 2019-05-20 RX ADMIN — Medication 50 MILLIGRAM(S): at 09:47

## 2019-05-20 RX ADMIN — Medication 1 MILLIGRAM(S): at 03:50

## 2019-05-20 NOTE — PROGRESS NOTE BEHAVIORAL HEALTH - NSBHFUPINTERVALHXFT_PSY_A_CORE
Pt was seen, evaluated and chart reviewed.  Case discussed in tx team meeting.  Patient is hyperverbal now, and remains disorganized, with inappropriate behavior, and episodes of verbal agitation.   Patient is labile, angry  at times, pleasant at other times.  Patient is paranoid today, and reports he feels chicken salad is helping him, and that no one who is after him could ever eat his chicken salad.   Compliant with meds, and continues to need 1:1 for direction and limits and safety  Also ordered enhanced supervision for nights. Patient is permitted fresh air walk with 2:1 staff bid due to his repeated requests to be able to walk outside.  .  Denies any SI, HI, AH or VH.  No Rx SE or sx TD/EPS are noted or reported.

## 2019-05-21 PROCEDURE — 99232 SBSQ HOSP IP/OBS MODERATE 35: CPT

## 2019-05-21 RX ORDER — NYSTATIN/TRIAMCINOLONE ACET
1 OINTMENT (GRAM) TOPICAL
Refills: 0 | Status: COMPLETED | OUTPATIENT
Start: 2019-05-21 | End: 2019-05-24

## 2019-05-21 RX ORDER — DIPHENHYDRAMINE HCL 50 MG
25 CAPSULE ORAL ONCE
Refills: 0 | Status: COMPLETED | OUTPATIENT
Start: 2019-05-21 | End: 2019-05-21

## 2019-05-21 RX ADMIN — RISPERIDONE 2 MILLIGRAM(S): 4 TABLET ORAL at 20:17

## 2019-05-21 RX ADMIN — Medication 1 MILLIGRAM(S): at 20:17

## 2019-05-21 RX ADMIN — Medication 50 MILLIGRAM(S): at 08:56

## 2019-05-21 RX ADMIN — Medication 0.5 MILLIGRAM(S): at 09:00

## 2019-05-21 RX ADMIN — Medication 1 MILLIGRAM(S): at 09:00

## 2019-05-21 RX ADMIN — RISPERIDONE 2 MILLIGRAM(S): 4 TABLET ORAL at 08:56

## 2019-05-21 RX ADMIN — Medication 50 MILLIGRAM(S): at 18:07

## 2019-05-21 NOTE — PROGRESS NOTE BEHAVIORAL HEALTH - NSBHFUPINTERVALHXFT_PSY_A_CORE
Pt was seen, evaluated and chart reviewed.  Case discussed in tx team meeting. No significant interval events are reported except patient noted to have rash on face.  Seen by Dr. 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   Patient is labile, angry  at times, pleasant at other times.  Patient continues paranoid today, and reports that others in the past caused his orange juice allergy.  Compliant with meds, and continues to need 1:1 for direction and limits and safety  Also ordered enhanced supervision for nights. Fresh air walk discontinued due to staff feeling uncomfortable taking patient off the unit due to lability.   .  Denies any SI, HI, AH or VH.  No Rx SE or sx TD/EPS are noted or reported.

## 2019-05-22 LAB — GLUCOSE BLDC GLUCOMTR-MCNC: 120 MG/DL — HIGH (ref 70–99)

## 2019-05-22 PROCEDURE — 99232 SBSQ HOSP IP/OBS MODERATE 35: CPT

## 2019-05-22 RX ADMIN — Medication 1 APPLICATION(S): at 08:56

## 2019-05-22 RX ADMIN — Medication 1 MILLIGRAM(S): at 22:04

## 2019-05-22 RX ADMIN — RISPERIDONE 2 MILLIGRAM(S): 4 TABLET ORAL at 22:04

## 2019-05-22 RX ADMIN — Medication 50 MILLIGRAM(S): at 08:56

## 2019-05-22 RX ADMIN — Medication 50 MILLIGRAM(S): at 17:44

## 2019-05-22 RX ADMIN — Medication 0.5 MILLIGRAM(S): at 08:58

## 2019-05-22 RX ADMIN — RISPERIDONE 2 MILLIGRAM(S): 4 TABLET ORAL at 08:56

## 2019-05-22 RX ADMIN — HALOPERIDOL DECANOATE 5 MILLIGRAM(S): 100 INJECTION INTRAMUSCULAR at 14:25

## 2019-05-22 RX ADMIN — Medication 2 MILLIGRAM(S): at 14:25

## 2019-05-22 RX ADMIN — Medication 1 MILLIGRAM(S): at 08:56

## 2019-05-22 NOTE — PROGRESS NOTE BEHAVIORAL HEALTH - OTHER
older than stated age hostile verbal behavior at times tenuous, verbal agitation at times poor dentition, hyperverbal at times

## 2019-05-22 NOTE — PROGRESS NOTE BEHAVIORAL HEALTH - NSBHFUPINTERVALHXFT_PSY_A_CORE
Pt was seen, evaluated and chart reviewed.  Case discussed in tx team meeting. No significant interval events are reported.  Patient is hyperverbal at times, , and remains disorganized, with inappropriate behavior, and episodes of verbal agitation. Patient had extended period of verbal agitation today, and had prn Ativan and Haldol with some relief noted.   Patient is labile, angry  at times, pleasant at other times.  Patient continues paranoid today, and reports that the police will amarjit his daughter ( patient does not have a daughter, per his sister, and patient lives in [facility] housing)  Compliant with meds, and continues to need 1:1 for direction and limits and safety  Also ordered enhanced supervision for nights.   .  Denies any SI, HI, AH or VH.  No Rx SE or sx TD/EPS are noted or reported.

## 2019-05-23 LAB — GLUCOSE BLDC GLUCOMTR-MCNC: 105 MG/DL — HIGH (ref 70–99)

## 2019-05-23 PROCEDURE — 99232 SBSQ HOSP IP/OBS MODERATE 35: CPT

## 2019-05-23 RX ADMIN — Medication 50 MILLIGRAM(S): at 22:19

## 2019-05-23 RX ADMIN — RISPERIDONE 2 MILLIGRAM(S): 4 TABLET ORAL at 22:19

## 2019-05-23 RX ADMIN — Medication 0.5 MILLIGRAM(S): at 09:06

## 2019-05-23 RX ADMIN — Medication 50 MILLIGRAM(S): at 09:06

## 2019-05-23 RX ADMIN — Medication 1 APPLICATION(S): at 09:06

## 2019-05-23 RX ADMIN — Medication 1 APPLICATION(S): at 22:20

## 2019-05-23 RX ADMIN — Medication 1 MILLIGRAM(S): at 09:06

## 2019-05-23 RX ADMIN — Medication 1 APPLICATION(S): at 09:07

## 2019-05-23 RX ADMIN — RISPERIDONE 2 MILLIGRAM(S): 4 TABLET ORAL at 09:06

## 2019-05-23 RX ADMIN — Medication 1 MILLIGRAM(S): at 22:18

## 2019-05-23 NOTE — PROGRESS NOTE BEHAVIORAL HEALTH - NSBHFUPINTERVALHXFT_PSY_A_CORE
Pt was seen, evaluated and chart reviewed.  Case discussed in tx team meeting. No significant interval events are reported, except that socrates has been in improved behavioral control for several days, so Constant Observation is DC.  Patient placed on Enhanced Supervision.   Patient is hyperverbal at times, , and remains disorganized, with inappropriate behavior, but has had less  episodes of verbal agitation.    Patient is less labile, continues angry  at times, pleasant at other times.  Patient continues paranoid today, and reports that the police will be angry and arrest him if he does not have mayonnaise for their sandwiches.   Compliant with meds, and redirection.    .  Denies any SI, HI, AH or VH.  No Rx SE or sx TD/EPS are noted or reported.

## 2019-05-24 PROCEDURE — 99232 SBSQ HOSP IP/OBS MODERATE 35: CPT

## 2019-05-24 RX ORDER — CLONAZEPAM 1 MG
1 TABLET ORAL AT BEDTIME
Refills: 0 | Status: DISCONTINUED | OUTPATIENT
Start: 2019-05-24 | End: 2019-05-30

## 2019-05-24 RX ORDER — CLONAZEPAM 1 MG
0.5 TABLET ORAL DAILY
Refills: 0 | Status: DISCONTINUED | OUTPATIENT
Start: 2019-05-24 | End: 2019-05-30

## 2019-05-24 RX ADMIN — Medication 50 MILLIGRAM(S): at 21:02

## 2019-05-24 RX ADMIN — Medication 1 MILLIGRAM(S): at 21:01

## 2019-05-24 RX ADMIN — RISPERIDONE 2 MILLIGRAM(S): 4 TABLET ORAL at 21:02

## 2019-05-24 RX ADMIN — Medication 1 APPLICATION(S): at 08:57

## 2019-05-24 RX ADMIN — Medication 50 MILLIGRAM(S): at 08:54

## 2019-05-24 RX ADMIN — Medication 1 APPLICATION(S): at 08:56

## 2019-05-24 RX ADMIN — Medication 1 MILLIGRAM(S): at 08:54

## 2019-05-24 RX ADMIN — Medication 0.5 MILLIGRAM(S): at 08:54

## 2019-05-24 RX ADMIN — RISPERIDONE 2 MILLIGRAM(S): 4 TABLET ORAL at 08:54

## 2019-05-24 NOTE — PROGRESS NOTE BEHAVIORAL HEALTH - OTHER
older than stated age irritable verbal behavior at times less tenuous poor dentition, hyperverbal at times irritable at times, calm at other times

## 2019-05-24 NOTE — PROGRESS NOTE BEHAVIORAL HEALTH - NSBHFUPINTERVALHXFT_PSY_A_CORE
Pt was seen, evaluated and chart reviewed.  Case discussed in tx team meeting. No significant interval events are reported, except that patient has been in improved behavioral control for several days, and can have fresh air walks with staff as tolerated. .  Patient placed on Enhanced Supervision.   Patient is hyperverbal at times, , and remains disorganized, with hx of inappropriate behavior, but has had less  episodes of verbal agitation.    Patient is less labile, continues angry  at times, pleasant at other times.  Patient continues paranoid today, and reports that torpedoes are on their way here, but that we will all be safe.  Patient reports he wants to go back to his [facility].    Compliant with meds, and redirection.    .  Denies any SI, HI, AH or VH.  No Rx SE or sx TD/EPS are noted or reported.

## 2019-05-25 RX ADMIN — Medication 50 MILLIGRAM(S): at 08:17

## 2019-05-25 RX ADMIN — Medication 1 MILLIGRAM(S): at 08:17

## 2019-05-25 RX ADMIN — Medication 1 MILLIGRAM(S): at 21:56

## 2019-05-25 RX ADMIN — RISPERIDONE 2 MILLIGRAM(S): 4 TABLET ORAL at 08:17

## 2019-05-25 RX ADMIN — Medication 2 MILLIGRAM(S): at 07:45

## 2019-05-25 RX ADMIN — Medication 1 APPLICATION(S): at 08:18

## 2019-05-25 RX ADMIN — Medication 1 MILLIGRAM(S): at 21:55

## 2019-05-25 RX ADMIN — Medication 50 MILLIGRAM(S): at 21:55

## 2019-05-25 RX ADMIN — RISPERIDONE 2 MILLIGRAM(S): 4 TABLET ORAL at 21:55

## 2019-05-26 RX ADMIN — Medication 50 MILLIGRAM(S): at 08:51

## 2019-05-26 RX ADMIN — Medication 0.5 MILLIGRAM(S): at 08:51

## 2019-05-26 RX ADMIN — Medication 1 MILLIGRAM(S): at 21:13

## 2019-05-26 RX ADMIN — RISPERIDONE 2 MILLIGRAM(S): 4 TABLET ORAL at 21:13

## 2019-05-26 RX ADMIN — Medication 1 MILLIGRAM(S): at 08:51

## 2019-05-26 RX ADMIN — RISPERIDONE 2 MILLIGRAM(S): 4 TABLET ORAL at 08:51

## 2019-05-26 RX ADMIN — Medication 50 MILLIGRAM(S): at 21:13

## 2019-05-27 RX ADMIN — RISPERIDONE 2 MILLIGRAM(S): 4 TABLET ORAL at 08:53

## 2019-05-27 RX ADMIN — Medication 0.5 MILLIGRAM(S): at 08:53

## 2019-05-27 RX ADMIN — Medication 1 MILLIGRAM(S): at 21:20

## 2019-05-27 RX ADMIN — Medication 1 APPLICATION(S): at 08:53

## 2019-05-27 RX ADMIN — Medication 1 MILLIGRAM(S): at 08:53

## 2019-05-27 RX ADMIN — Medication 50 MILLIGRAM(S): at 21:20

## 2019-05-27 RX ADMIN — RISPERIDONE 2 MILLIGRAM(S): 4 TABLET ORAL at 21:20

## 2019-05-27 RX ADMIN — Medication 50 MILLIGRAM(S): at 08:53

## 2019-05-28 PROCEDURE — 99232 SBSQ HOSP IP/OBS MODERATE 35: CPT

## 2019-05-28 RX ADMIN — RISPERIDONE 2 MILLIGRAM(S): 4 TABLET ORAL at 09:09

## 2019-05-28 RX ADMIN — Medication 50 MILLIGRAM(S): at 09:09

## 2019-05-28 RX ADMIN — Medication 50 MILLIGRAM(S): at 20:13

## 2019-05-28 RX ADMIN — Medication 1 MILLIGRAM(S): at 20:13

## 2019-05-28 RX ADMIN — Medication 1 MILLIGRAM(S): at 09:09

## 2019-05-28 RX ADMIN — RISPERIDONE 2 MILLIGRAM(S): 4 TABLET ORAL at 20:13

## 2019-05-28 RX ADMIN — Medication 50 MILLIGRAM(S): at 02:21

## 2019-05-28 RX ADMIN — Medication 0.5 MILLIGRAM(S): at 09:09

## 2019-05-28 NOTE — PROGRESS NOTE BEHAVIORAL HEALTH - OTHER
older than stated age irritable verbal behavior at times tenuous poor dentition, hyperverbal at times irritable at times, calm at other times

## 2019-05-28 NOTE — PROGRESS NOTE BEHAVIORAL HEALTH - NSBHFUPINTERVALHXFT_PSY_A_CORE
Pt was seen, evaluated and chart reviewed.  Case discussed in tx team meeting. No significant interval events are reported, except that patient has been in improved behavioral control for several days, and can have fresh air walks with staff as tolerated. .  Patient placed on Enhanced Supervision.   Patient is hyperverbal at times, , and remains disorganized, with hx of inappropriate behavior, but has had less  episodes of verbal agitation.  Patient was verbally agitiated after learning he would not be returning to [facility], but maintained behavioral control  Patient is less labile, continues angry  at times, pleasant at other times.  Patient continues paranoid today, and reports that he controls all electricity, but others may try to take it from him.    Compliant with meds, and redirection.    .  Denies any SI, HI, AH or VH.  No Rx SE or sx TD/EPS are noted or reported.  [facility] staff member in meeting reports he feels patient is more appropriate for [facility], and discussed patient's case at length.

## 2019-05-29 PROCEDURE — 99232 SBSQ HOSP IP/OBS MODERATE 35: CPT

## 2019-05-29 RX ADMIN — Medication 1 MILLIGRAM(S): at 20:37

## 2019-05-29 RX ADMIN — Medication 0.5 MILLIGRAM(S): at 08:56

## 2019-05-29 RX ADMIN — RISPERIDONE 2 MILLIGRAM(S): 4 TABLET ORAL at 08:57

## 2019-05-29 RX ADMIN — Medication 50 MILLIGRAM(S): at 20:37

## 2019-05-29 RX ADMIN — Medication 2 MILLIGRAM(S): at 11:43

## 2019-05-29 RX ADMIN — Medication 1 MILLIGRAM(S): at 08:57

## 2019-05-29 RX ADMIN — Medication 50 MILLIGRAM(S): at 08:57

## 2019-05-29 RX ADMIN — HALOPERIDOL DECANOATE 5 MILLIGRAM(S): 100 INJECTION INTRAMUSCULAR at 11:44

## 2019-05-29 RX ADMIN — RISPERIDONE 2 MILLIGRAM(S): 4 TABLET ORAL at 20:37

## 2019-05-29 NOTE — PROGRESS NOTE BEHAVIORAL HEALTH - NSBHFUPINTERVALHXFT_PSY_A_CORE
Pt was seen, evaluated and chart reviewed.  Case discussed in tx team meeting. No significant interval events are reported, except that patient has been in variable behavioral control for several days, and fresh air walks are DC due to patient having more episodes of verba agitation and threatening staff  .  Patient continues on Enhanced Supervision.   Patient is hyperverbal at times, , and remains disorganized, with hx of inappropriate behavior, and now has more  episodes of verbal agitation.  Patient has been verbally agitated after learning he would not be returning to [facility], and has been threatening staff.   Patient is more labile, continues angry  at times, pleasant at other times.  Patient continues paranoid today, and reports that the staff are his enemies..    Compliant with meds, and redirection.    .  Denies any SI, HI, AH or VH.  No Rx SE or sx TD/EPS are noted or reported.

## 2019-05-30 PROCEDURE — 99233 SBSQ HOSP IP/OBS HIGH 50: CPT

## 2019-05-30 RX ORDER — HALOPERIDOL DECANOATE 100 MG/ML
5 INJECTION INTRAMUSCULAR ONCE
Refills: 0 | Status: DISCONTINUED | OUTPATIENT
Start: 2019-05-30 | End: 2019-05-30

## 2019-05-30 RX ORDER — CLONAZEPAM 1 MG
0.5 TABLET ORAL DAILY
Refills: 0 | Status: DISCONTINUED | OUTPATIENT
Start: 2019-05-30 | End: 2019-06-05

## 2019-05-30 RX ORDER — CLONAZEPAM 1 MG
1 TABLET ORAL AT BEDTIME
Refills: 0 | Status: DISCONTINUED | OUTPATIENT
Start: 2019-05-30 | End: 2019-06-05

## 2019-05-30 RX ORDER — HALOPERIDOL DECANOATE 100 MG/ML
5 INJECTION INTRAMUSCULAR ONCE
Refills: 0 | Status: COMPLETED | OUTPATIENT
Start: 2019-05-30 | End: 2019-05-30

## 2019-05-30 RX ADMIN — Medication 50 MILLIGRAM(S): at 22:03

## 2019-05-30 RX ADMIN — Medication 1 MILLIGRAM(S): at 08:32

## 2019-05-30 RX ADMIN — Medication 0.5 MILLIGRAM(S): at 08:32

## 2019-05-30 RX ADMIN — Medication 2 MILLIGRAM(S): at 11:21

## 2019-05-30 RX ADMIN — HALOPERIDOL DECANOATE 5 MILLIGRAM(S): 100 INJECTION INTRAMUSCULAR at 11:21

## 2019-05-30 RX ADMIN — Medication 1 MILLIGRAM(S): at 22:03

## 2019-05-30 RX ADMIN — RISPERIDONE 2 MILLIGRAM(S): 4 TABLET ORAL at 22:03

## 2019-05-30 RX ADMIN — Medication 50 MILLIGRAM(S): at 08:33

## 2019-05-30 RX ADMIN — RISPERIDONE 2 MILLIGRAM(S): 4 TABLET ORAL at 08:33

## 2019-05-30 RX ADMIN — Medication 1 MILLIGRAM(S): at 22:02

## 2019-05-30 NOTE — PROGRESS NOTE BEHAVIORAL HEALTH - OTHER
poor dentition, hyperverbal at times irritable at times, calm at other times older than stated age irritable verbal behavior at times, uncooperative at times tenuous

## 2019-05-30 NOTE — PROGRESS NOTE BEHAVIORAL HEALTH - NSBHFUPINTERVALHXFT_PSY_A_CORE
Pt was seen, evaluated and chart reviewed.  Case discussed in tx team meeting. No significant interval events are reported, except that patient had verbal agitation and threatening staff today, and refuse po haldol and Ativan, so was given IM Haldol and Ativan. Patient was cooperative with injection.   .  Patient continues on Enhanced Supervision.   Patient is hyperverbal at times, , and remains disorganized, with hx of inappropriate behavior, and now has more  episodes of verbal agitation.  Patient has been verbally agitated after learning he would not be returning to [facility], and has been threatening staff.  Patient continues paranoid today, and reports that he is running everything.   .  Denies any SI, HI, AH or VH.  No Rx SE or sx TD/EPS are noted or reported.

## 2019-05-30 NOTE — PROVIDER CONTACT NOTE (OTHER) - RECOMMENDATIONS
Writer will encourage pt to continue attending daily leisure groups, socialize with peers, maintain ADLS, maintain behavioral control, and demonstrate medication adherence.

## 2019-05-30 NOTE — PROVIDER CONTACT NOTE (OTHER) - ASSESSMENT
Pt. remains visible on the milieu as he walks around most of the day. He is friendly and will talk to writer multiple times during the day when our paths cross in the shields. Pt. is unable to sit for the hour that behavioral health skilled occupational therapy groups require, so he usually does not attend. Pt. knows to come in the afternoon, during social hour, to make himself a cup of coffee: no milk with 2 sugars. He prefers to prepare it himself as opposed to staff doing it for him. He is a limited man, who requires assistance with certain tasks. He is on the unit this long due to a disposition issue that SW has been working on.  Prior notes from this writer regarding this pt. are in the hard chart.  -Shirley Taylor MS, OTR/L
Pt has attended a few groups offered over the past week with prompting by staff and writer.  Pt is not appropriate for discussion based groups at this time due to being unable to stop talking despite redirection.  While in leisure based groups pt is cooperative however appears restless, coming in and out of groups several times during session.  Pt has been increasing irritable and labile over the past week, declining to engage with this writer despite multiple attempts.  Pt has been observed being increasingly verbally aggressive with staff.  Pt impulse control is tenuous, insight and judgment are limited.  Pt remains disorganized and maintaining poor ADLs.  Pt makes partial eye contact and has unclear speech, hyperverbal at times and difficult to interrupt.  Pt is minimally social with peers, often times isolating himself from the milieu.

## 2019-05-31 PROCEDURE — 99232 SBSQ HOSP IP/OBS MODERATE 35: CPT

## 2019-05-31 RX ORDER — TUBERCULIN PURIFIED PROTEIN DERIVATIVE 5 [IU]/.1ML
5 INJECTION, SOLUTION INTRADERMAL ONCE
Refills: 0 | Status: COMPLETED | OUTPATIENT
Start: 2019-05-31 | End: 2019-05-31

## 2019-05-31 RX ADMIN — Medication 1 MILLIGRAM(S): at 21:30

## 2019-05-31 RX ADMIN — RISPERIDONE 2 MILLIGRAM(S): 4 TABLET ORAL at 21:28

## 2019-05-31 RX ADMIN — RISPERIDONE 2 MILLIGRAM(S): 4 TABLET ORAL at 09:16

## 2019-05-31 RX ADMIN — Medication 50 MILLIGRAM(S): at 09:16

## 2019-05-31 RX ADMIN — Medication 1 MILLIGRAM(S): at 09:16

## 2019-05-31 RX ADMIN — Medication 50 MILLIGRAM(S): at 21:28

## 2019-05-31 RX ADMIN — TUBERCULIN PURIFIED PROTEIN DERIVATIVE 5 UNIT(S): 5 INJECTION, SOLUTION INTRADERMAL at 16:25

## 2019-05-31 RX ADMIN — Medication 1 MILLIGRAM(S): at 21:28

## 2019-05-31 RX ADMIN — Medication 0.5 MILLIGRAM(S): at 09:16

## 2019-05-31 RX ADMIN — Medication 2 MILLIGRAM(S): at 13:52

## 2019-05-31 NOTE — PROGRESS NOTE BEHAVIORAL HEALTH - OTHER
tenuous poor dentition, hyperverbal at times irritable at times, calm at other times older than stated age irritable verbal behavior at times, uncooperative at times

## 2019-05-31 NOTE — PROGRESS NOTE BEHAVIORAL HEALTH - NSBHFUPINTERVALHXFT_PSY_A_CORE
Pt was seen, evaluated and chart reviewed.  Case discussed in tx team meeting. No significant interval events are reported, except that patient had verbal agitation and threatening staff today, and was given po Ativan. . .  Patient continues on Enhanced Supervision.   Patient is hyperverbal at times, , and remains disorganized, with hx of inappropriate behavior, and now has more  episodes of verbal agitation.  Patient has been verbally agitated after learning he would not be returning to [facility], and has been threatening staff.  Patient continues paranoid today, and reports staff is against him because he was in the [employer].   .  Denies any SI, HI, AH or VH.  No Rx SE or sx TD/EPS are noted or reported. Pt was seen, evaluated and chart reviewed.  Case discussed in tx team meeting. No significant interval events are reported, except that patient had verbal agitation and threatening staff today, and was given po Ativan with good results.   Patient continues on Enhanced Supervision.   Patient is hyperverbal at times, , and remains disorganized, with hx of inappropriate behavior, and now has more  episodes of verbal agitation.  Patient has been verbally agitated after learning he would not be returning to [facility], and has been threatening staff.  Patient continues paranoid today, and reports staff is against him because he was in the [employer].   .  Denies any SI, HI, AH or VH.  No Rx SE or sx TD/EPS are noted or reported.

## 2019-06-01 RX ADMIN — Medication 2 MILLIGRAM(S): at 07:55

## 2019-06-01 RX ADMIN — Medication 1 MILLIGRAM(S): at 20:10

## 2019-06-01 RX ADMIN — Medication 50 MILLIGRAM(S): at 09:17

## 2019-06-01 RX ADMIN — RISPERIDONE 2 MILLIGRAM(S): 4 TABLET ORAL at 09:17

## 2019-06-01 RX ADMIN — HALOPERIDOL DECANOATE 5 MILLIGRAM(S): 100 INJECTION INTRAMUSCULAR at 20:10

## 2019-06-01 RX ADMIN — Medication 50 MILLIGRAM(S): at 20:10

## 2019-06-01 RX ADMIN — Medication 1 MILLIGRAM(S): at 09:17

## 2019-06-01 RX ADMIN — Medication 0.5 MILLIGRAM(S): at 09:18

## 2019-06-01 RX ADMIN — RISPERIDONE 2 MILLIGRAM(S): 4 TABLET ORAL at 20:10

## 2019-06-02 RX ADMIN — RISPERIDONE 2 MILLIGRAM(S): 4 TABLET ORAL at 09:47

## 2019-06-02 RX ADMIN — Medication 50 MILLIGRAM(S): at 20:49

## 2019-06-02 RX ADMIN — HALOPERIDOL DECANOATE 5 MILLIGRAM(S): 100 INJECTION INTRAMUSCULAR at 20:49

## 2019-06-02 RX ADMIN — Medication 1 MILLIGRAM(S): at 09:47

## 2019-06-02 RX ADMIN — Medication 1 MILLIGRAM(S): at 20:49

## 2019-06-02 RX ADMIN — Medication 50 MILLIGRAM(S): at 09:47

## 2019-06-02 RX ADMIN — RISPERIDONE 2 MILLIGRAM(S): 4 TABLET ORAL at 20:49

## 2019-06-02 RX ADMIN — Medication 0.5 MILLIGRAM(S): at 09:47

## 2019-06-02 NOTE — PROGRESS NOTE BEHAVIORAL HEALTH - OTHER
older than stated age irritable verbal behavior at times, uncooperative at times tenuous poor dentition, hyperverbal at times irritable at times, calm at other times

## 2019-06-02 NOTE — PROGRESS NOTE BEHAVIORAL HEALTH - NSBHFUPINTERVALHXFT_PSY_A_CORE
Pt was seen, evaluated and chart reviewed.   No significant interval events are reported. Patient continues on Enhanced Supervision.   Patient is hyperverbal at times, , and remains disorganized, with hx of inappropriate behavior, and now has more  episodes of verbal agitation.  Patient continues paranoid today, and reports staff is against him because he was in the [employer].  Denies any SI, HI, AH or VH.  No Rx SE or sx TD/EPS are noted or reported.

## 2019-06-03 PROCEDURE — 99232 SBSQ HOSP IP/OBS MODERATE 35: CPT

## 2019-06-03 RX ADMIN — Medication 1 MILLIGRAM(S): at 08:50

## 2019-06-03 RX ADMIN — Medication 50 MILLIGRAM(S): at 20:38

## 2019-06-03 RX ADMIN — Medication 2 MILLIGRAM(S): at 08:23

## 2019-06-03 RX ADMIN — RISPERIDONE 2 MILLIGRAM(S): 4 TABLET ORAL at 08:50

## 2019-06-03 RX ADMIN — Medication 1 MILLIGRAM(S): at 20:37

## 2019-06-03 RX ADMIN — Medication 50 MILLIGRAM(S): at 08:50

## 2019-06-03 RX ADMIN — Medication 1 MILLIGRAM(S): at 20:38

## 2019-06-03 RX ADMIN — Medication 0.5 MILLIGRAM(S): at 08:50

## 2019-06-03 RX ADMIN — RISPERIDONE 2 MILLIGRAM(S): 4 TABLET ORAL at 20:38

## 2019-06-03 NOTE — PROGRESS NOTE BEHAVIORAL HEALTH - OTHER
poor dentition, hyperverbal at times irritable at times, calm at other times older than stated age irritable verbal behavior at times tenuous

## 2019-06-03 NOTE — PROGRESS NOTE BEHAVIORAL HEALTH - NSBHFUPINTERVALHXFT_PSY_A_CORE
Pt was seen, evaluated and chart reviewed.   No significant interval events are reported. Patient continues on Enhanced Supervision. Patient has been in improved behavioral control, and is somewhat more responsive to staff direction and limits.   Patient is hyperverbal at times, , and remains disorganized, with hx of inappropriate behavior, and now has fewer  episodes of verbal agitation.  Patient continues paranoid today, and reports [facility] him, as he was the head of the [employer].   Denies any SI, HI, AH or VH.  No Rx SE or sx TD/EPS are noted or reported.

## 2019-06-04 LAB
ALBUMIN SERPL ELPH-MCNC: 3.7 G/DL — SIGNIFICANT CHANGE UP (ref 3.3–5)
ALP SERPL-CCNC: 88 U/L — SIGNIFICANT CHANGE UP (ref 30–120)
ALT FLD-CCNC: 51 U/L DA — SIGNIFICANT CHANGE UP (ref 10–60)
ANION GAP SERPL CALC-SCNC: 9 MMOL/L — SIGNIFICANT CHANGE UP (ref 5–17)
AST SERPL-CCNC: 27 U/L — SIGNIFICANT CHANGE UP (ref 10–40)
BILIRUB SERPL-MCNC: 0.5 MG/DL — SIGNIFICANT CHANGE UP (ref 0.2–1.2)
BUN SERPL-MCNC: 23 MG/DL — SIGNIFICANT CHANGE UP (ref 7–23)
CALCIUM SERPL-MCNC: 9.4 MG/DL — SIGNIFICANT CHANGE UP (ref 8.4–10.5)
CHLORIDE SERPL-SCNC: 100 MMOL/L — SIGNIFICANT CHANGE UP (ref 96–108)
CO2 SERPL-SCNC: 26 MMOL/L — SIGNIFICANT CHANGE UP (ref 22–31)
CREAT SERPL-MCNC: 0.66 MG/DL — SIGNIFICANT CHANGE UP (ref 0.5–1.3)
GLUCOSE SERPL-MCNC: 123 MG/DL — HIGH (ref 70–99)
HCT VFR BLD CALC: 45.6 % — SIGNIFICANT CHANGE UP (ref 39–50)
HGB BLD-MCNC: 15.5 G/DL — SIGNIFICANT CHANGE UP (ref 13–17)
MCHC RBC-ENTMCNC: 30.8 PG — SIGNIFICANT CHANGE UP (ref 27–34)
MCHC RBC-ENTMCNC: 34 GM/DL — SIGNIFICANT CHANGE UP (ref 32–36)
MCV RBC AUTO: 90.5 FL — SIGNIFICANT CHANGE UP (ref 80–100)
NRBC # BLD: 0 /100 WBCS — SIGNIFICANT CHANGE UP (ref 0–0)
PLATELET # BLD AUTO: 310 K/UL — SIGNIFICANT CHANGE UP (ref 150–400)
POTASSIUM SERPL-MCNC: 4.2 MMOL/L — SIGNIFICANT CHANGE UP (ref 3.5–5.3)
POTASSIUM SERPL-SCNC: 4.2 MMOL/L — SIGNIFICANT CHANGE UP (ref 3.5–5.3)
PROT SERPL-MCNC: 7.4 G/DL — SIGNIFICANT CHANGE UP (ref 6–8.3)
RBC # BLD: 5.04 M/UL — SIGNIFICANT CHANGE UP (ref 4.2–5.8)
RBC # FLD: 11.6 % — SIGNIFICANT CHANGE UP (ref 10.3–14.5)
SODIUM SERPL-SCNC: 135 MMOL/L — SIGNIFICANT CHANGE UP (ref 135–145)
WBC # BLD: 8.12 K/UL — SIGNIFICANT CHANGE UP (ref 3.8–10.5)
WBC # FLD AUTO: 8.12 K/UL — SIGNIFICANT CHANGE UP (ref 3.8–10.5)

## 2019-06-04 PROCEDURE — 99232 SBSQ HOSP IP/OBS MODERATE 35: CPT

## 2019-06-04 RX ADMIN — HALOPERIDOL DECANOATE 5 MILLIGRAM(S): 100 INJECTION INTRAMUSCULAR at 10:52

## 2019-06-04 RX ADMIN — Medication 2 MILLIGRAM(S): at 10:52

## 2019-06-04 RX ADMIN — Medication 0.5 MILLIGRAM(S): at 08:05

## 2019-06-04 RX ADMIN — Medication 50 MILLIGRAM(S): at 22:00

## 2019-06-04 RX ADMIN — Medication 50 MILLIGRAM(S): at 08:05

## 2019-06-04 RX ADMIN — Medication 1 MILLIGRAM(S): at 22:02

## 2019-06-04 RX ADMIN — RISPERIDONE 2 MILLIGRAM(S): 4 TABLET ORAL at 08:05

## 2019-06-04 RX ADMIN — Medication 1 MILLIGRAM(S): at 08:05

## 2019-06-04 RX ADMIN — Medication 1 MILLIGRAM(S): at 22:00

## 2019-06-04 RX ADMIN — RISPERIDONE 2 MILLIGRAM(S): 4 TABLET ORAL at 22:00

## 2019-06-04 NOTE — PROGRESS NOTE BEHAVIORAL HEALTH - NSBHFUPINTERVALHXFT_PSY_A_CORE
Pt was seen, evaluated and chart reviewed. Case discussed in tx team meeting.   No significant interval events are reported. Patient continues on Enhanced Supervision. Patient has been in improved behavioral control, and is somewhat more responsive to staff direction and limits.   Patient is hyperverbal at times, , and remains disorganized, with hx of inappropriate behavior, and now has occasional  episodes of verbal agitation.  Patient continues paranoid today, and reports staff is causing his leg to grow out of his head.   Denies any SI, HI, AH or VH.  No Rx SE or sx TD/EPS are noted or reported. Patient ambulates around unit, does not attend groups

## 2019-06-05 PROCEDURE — 99232 SBSQ HOSP IP/OBS MODERATE 35: CPT

## 2019-06-05 RX ORDER — CLONAZEPAM 1 MG
1 TABLET ORAL AT BEDTIME
Refills: 0 | Status: DISCONTINUED | OUTPATIENT
Start: 2019-06-05 | End: 2019-06-11

## 2019-06-05 RX ORDER — CLONAZEPAM 1 MG
0.5 TABLET ORAL DAILY
Refills: 0 | Status: DISCONTINUED | OUTPATIENT
Start: 2019-06-05 | End: 2019-06-11

## 2019-06-05 RX ADMIN — Medication 1 MILLIGRAM(S): at 20:31

## 2019-06-05 RX ADMIN — RISPERIDONE 2 MILLIGRAM(S): 4 TABLET ORAL at 09:08

## 2019-06-05 RX ADMIN — RISPERIDONE 2 MILLIGRAM(S): 4 TABLET ORAL at 20:30

## 2019-06-05 RX ADMIN — Medication 50 MILLIGRAM(S): at 09:08

## 2019-06-05 RX ADMIN — Medication 50 MILLIGRAM(S): at 20:31

## 2019-06-05 RX ADMIN — Medication 0.5 MILLIGRAM(S): at 09:08

## 2019-06-05 RX ADMIN — Medication 1 MILLIGRAM(S): at 09:08

## 2019-06-05 NOTE — PROGRESS NOTE BEHAVIORAL HEALTH - NSBHFUPINTERVALHXFT_PSY_A_CORE
Pt was seen, evaluated and chart reviewed. Case discussed in tx team meeting.   No significant interval events are reported. Patient continues on Enhanced Supervision. Patient has been in improved behavioral control, and is somewhat more responsive to staff direction and limits.   Patient is hyperverbal at times, and remains disorganized, with hx of inappropriate behavior, and now has occasional  episodes of verbal agitation.  Patient continues paranoid today, and reports the [employer] is preventing him from returning to his home, and that they are watching his home.   Denies any SI, HI, AH or VH.  No Rx SE or sx TD/EPS are noted or reported. Patient ambulates around unit, does not attend groups, and is verbal with staff. He is easily frustrated at times

## 2019-06-06 LAB
CK SERPL-CCNC: 84 U/L — SIGNIFICANT CHANGE UP (ref 39–308)
HBA1C BLD-MCNC: 6.5 % — HIGH (ref 4–5.6)

## 2019-06-06 PROCEDURE — 99232 SBSQ HOSP IP/OBS MODERATE 35: CPT

## 2019-06-06 RX ORDER — HALOPERIDOL DECANOATE 100 MG/ML
5 INJECTION INTRAMUSCULAR ONCE
Refills: 0 | Status: COMPLETED | OUTPATIENT
Start: 2019-06-06 | End: 2019-06-06

## 2019-06-06 RX ORDER — DIPHENHYDRAMINE HCL 50 MG
25 CAPSULE ORAL EVERY 6 HOURS
Refills: 0 | Status: DISCONTINUED | OUTPATIENT
Start: 2019-06-06 | End: 2019-06-20

## 2019-06-06 RX ADMIN — RISPERIDONE 2 MILLIGRAM(S): 4 TABLET ORAL at 09:21

## 2019-06-06 RX ADMIN — Medication 50 MILLIGRAM(S): at 09:21

## 2019-06-06 RX ADMIN — Medication 1 MILLIGRAM(S): at 21:32

## 2019-06-06 RX ADMIN — Medication 2 MILLIGRAM(S): at 11:00

## 2019-06-06 RX ADMIN — Medication 0.5 MILLIGRAM(S): at 09:21

## 2019-06-06 RX ADMIN — Medication 1 MILLIGRAM(S): at 21:33

## 2019-06-06 RX ADMIN — HALOPERIDOL DECANOATE 5 MILLIGRAM(S): 100 INJECTION INTRAMUSCULAR at 11:00

## 2019-06-06 RX ADMIN — RISPERIDONE 2 MILLIGRAM(S): 4 TABLET ORAL at 21:32

## 2019-06-06 RX ADMIN — Medication 50 MILLIGRAM(S): at 21:32

## 2019-06-06 RX ADMIN — Medication 1 MILLIGRAM(S): at 09:22

## 2019-06-06 RX ADMIN — Medication 25 MILLIGRAM(S): at 21:32

## 2019-06-06 NOTE — PROGRESS NOTE BEHAVIORAL HEALTH - NSBHFUPINTERVALHXFT_PSY_A_CORE
Pt was seen, evaluated and chart reviewed. Case discussed in tx team meeting.   No significant interval events are reported, except that patient became extremely verbally agitated and threatening staff today, after he was unable to complete [facility]. He required IM Ativan and Haldol, and was cooperative with the injection.  Patient continues on Enhanced Supervision. Patient has been in variable behavioral control, and is less responsive to staff direction and limits.   Patient is hyperverbal at times, and remains disorganized, with hx of inappropriate behavior, and now has   episodes of verbal agitation.  Patient continues paranoid today, and reports the staff caused the color guard to try to drown him.    Denies any SI, HI, AH or VH.  No Rx SE or sx TD/EPS are noted or reported. Patient ambulates around unit, does not attend groups, and is verbal with staff. He is easily frustrated at times

## 2019-06-06 NOTE — PROGRESS NOTE BEHAVIORAL HEALTH - OTHER
older than stated age irritable verbal behavior at times tenuous, impaired at times poor dentition, hyperverbal at times irritable and angry

## 2019-06-07 PROCEDURE — 99232 SBSQ HOSP IP/OBS MODERATE 35: CPT

## 2019-06-07 PROCEDURE — 99231 SBSQ HOSP IP/OBS SF/LOW 25: CPT

## 2019-06-07 RX ORDER — HALOPERIDOL DECANOATE 100 MG/ML
5 INJECTION INTRAMUSCULAR EVERY 6 HOURS
Refills: 0 | Status: DISCONTINUED | OUTPATIENT
Start: 2019-06-07 | End: 2019-06-13

## 2019-06-07 RX ADMIN — Medication 2 MILLIGRAM(S): at 09:40

## 2019-06-07 RX ADMIN — Medication 0.5 MILLIGRAM(S): at 08:27

## 2019-06-07 RX ADMIN — Medication 25 MILLIGRAM(S): at 21:04

## 2019-06-07 RX ADMIN — Medication 1 MILLIGRAM(S): at 21:06

## 2019-06-07 RX ADMIN — Medication 50 MILLIGRAM(S): at 08:27

## 2019-06-07 RX ADMIN — Medication 50 MILLIGRAM(S): at 21:05

## 2019-06-07 RX ADMIN — Medication 1 MILLIGRAM(S): at 08:27

## 2019-06-07 RX ADMIN — HALOPERIDOL DECANOATE 5 MILLIGRAM(S): 100 INJECTION INTRAMUSCULAR at 09:40

## 2019-06-07 RX ADMIN — RISPERIDONE 2 MILLIGRAM(S): 4 TABLET ORAL at 08:27

## 2019-06-07 RX ADMIN — RISPERIDONE 2 MILLIGRAM(S): 4 TABLET ORAL at 21:05

## 2019-06-07 NOTE — PROGRESS NOTE BEHAVIORAL HEALTH - NSBHFUPINTERVALHXFT_PSY_A_CORE
Pt was seen, evaluated and chart reviewed. Case discussed in tx team meeting.  He required IM Ativan and Haldol, and was cooperative with the injection.  Patient continues on Enhanced Supervision. Patient has been in variable behavioral control, and is less responsive to staff direction and limits.   Patient is hyperverbal at times, and remains disorganized, with hx of inappropriate behavior, and now has   episodes of verbal agitation.    Denies any SI, HI, AH or VH.  No Rx SE or sx TD/EPS are noted or reported. Patient ambulates around unit, does not attend groups, and is verbal with staff. He is easily frustrated at times

## 2019-06-07 NOTE — CONSULT NOTE ADULT - SUBJECTIVE AND OBJECTIVE BOX
63M with HTN and Schizophrnia is currently getting treatment for his schizophrenia in our inpatient psych unit here at [place].  During his stay here, the staff had wanted to wash his clothes for him causing the patient to become agitated.  During this time he was in the shower and after exiting he decided to grab his clothes and run after staff who might have taken his clothes to the wash.  During this episode as he was running back and forth the patient slipped on the wet floor from the shower and landed on his back side.  The patient immediately got up and was calmed down. I was called to evaluate for the fall. Patient has no pain and is walking around.  Fall was witnessed by multiple members of the staff and no reported head trauma.     REVIEW OF SYSTEMS:  CONSTITUTIONAL: No fever, weight loss, or fatigue  EYES: No eye pain, visual disturbances, or discharge  ENMT:  No difficulty hearing, tinnitus, vertigo; No sinus or throat pain  NECK: No pain or stiffness  RESPIRATORY: No cough, wheezing, chills or hemoptysis; No shortness of breath  CARDIOVASCULAR: No chest pain, palpitations, dizziness, or leg swelling  GASTROINTESTINAL: No abdominal or epigastric pain. No nausea, vomiting, or hematemesis; No diarrhea or constipation. No melena or hematochezia.  GENITOURINARY: No dysuria, frequency, hematuria, or incontinence  NEUROLOGICAL: No headaches, memory loss, loss of strength, numbness, or tremors  MUSCULOSKELETAL: No muscle or back pain      PAST MEDICAL & SURGICAL HISTORY:  No pertinent past medical history  No significant past surgical history      SOCIAL HISTORY:  Tobacco; EtOH; Illicit Drugs    Allergies    Mellaril (Unknown)  Orange Juice (Rash)  penicillin (Unknown)  Stelazine (Unknown)    Intolerances        MEDICATIONS  (STANDING):  benztropine 1 milliGRAM(s) Oral two times a day  clonazePAM  Tablet 1 milliGRAM(s) Oral at bedtime  clonazePAM  Tablet 0.5 milliGRAM(s) Oral daily  metoprolol succinate ER 50 milliGRAM(s) Oral daily  risperiDONE   Tablet 2 milliGRAM(s) Oral two times a day  traZODone 50 milliGRAM(s) Oral <User Schedule>    MEDICATIONS  (PRN):  ammonium lactate 12% Lotion 1 Application(s) Topical two times a day PRN dry skin  atropine 1% Ophthalmic Solution for SubLingual Use 1 Drop(s) SubLingual every 4 hours PRN excessive drooling  diphenhydrAMINE 25 milliGRAM(s) Oral every 6 hours PRN Extrapyramidal sx  haloperidol     Tablet 5 milliGRAM(s) Oral every 6 hours PRN agitation  haloperidol    Injectable 5 milliGRAM(s) IntraMuscular every 6 hours PRN agitation  LORazepam     Tablet 2 milliGRAM(s) Oral every 6 hours PRN Agitation  LORazepam   Injectable 2 milliGRAM(s) IntraMuscular every 6 hours PRN Agitation  magnesium hydroxide Suspension 30 milliLiter(s) Oral daily PRN Constipation  nicotine - Inhaler 1 Each Inhalation every 4 hours PRN nicotine cravings  polyethylene glycol 3350 17 Gram(s) Oral daily PRN Constipation  traZODone 50 milliGRAM(s) Oral at bedtime PRN insomnia      FAMILY HISTORY:  No pertinent family history in first degree relatives      Vital Signs Last 24 Hrs  T(C): 36.4 (07 Jun 2019 08:32), Max: 36.4 (07 Jun 2019 08:13)  T(F): 97.6 (07 Jun 2019 08:32), Max: 97.6 (07 Jun 2019 08:13)  HR: 92 (07 Jun 2019 16:08) (92 - 92)  BP: 140/86 (07 Jun 2019 16:08) (140/86 - 163/84)  BP(mean): --  RR: 16 (07 Jun 2019 16:08) (16 - 17)  SpO2: --    PHYSICAL EXAM:    GENERAL: NAD, well-developed  HEAD:  Atraumatic, Normocephalic  EYES: EOMI, PERRLA, conjunctiva and sclera clear  ENMT: No tonsillar erythema, exudates, or enlargement; Moist mucous membranes, Good dentition, No lesions  NECK: Supple, No JVD, Normal thyroid  NERVOUS SYSTEM:  Alert & Oriented X3, Good concentration; Moving all 4 extremities; No gross sensory deficits  CHEST/LUNG: Clear to auscultation bilaterally; No rales, rhonchi, wheezing, or rubs  HEART: Regular rate and rhythm; No murmurs, rubs, or gallops  ABDOMEN: Soft, Nontender, Nondistended; Bowel sounds present  EXTREMITIES:  2+ Peripheral Pulses, No clubbing, cyanosis, or edema      LABS:              CAPILLARY BLOOD GLUCOSE          RADIOLOGY & ADDITIONAL STUDIES:    EKG:   Personally Reviewed:  [ ] YES     Imaging:   Personally Reviewed:  [ ] YES     Consultant(s) Notes Reviewed:      Care Discussed with Consultants/Other Providers:

## 2019-06-07 NOTE — CONSULT NOTE ADULT - ASSESSMENT
63M with HTN and Schizophrenia being seen and consulted for fall.  Patient showing no sings of bruising or pain. No tenderness to palpation. Observe for now - no need for imaging.    Schizophrenia management as per psych.

## 2019-06-08 RX ADMIN — Medication 2 MILLIGRAM(S): at 09:19

## 2019-06-08 RX ADMIN — RISPERIDONE 2 MILLIGRAM(S): 4 TABLET ORAL at 20:07

## 2019-06-08 RX ADMIN — Medication 1 MILLIGRAM(S): at 20:07

## 2019-06-08 RX ADMIN — Medication 50 MILLIGRAM(S): at 20:07

## 2019-06-08 RX ADMIN — Medication 1 MILLIGRAM(S): at 08:19

## 2019-06-08 RX ADMIN — HALOPERIDOL DECANOATE 5 MILLIGRAM(S): 100 INJECTION INTRAMUSCULAR at 09:19

## 2019-06-08 RX ADMIN — Medication 0.5 MILLIGRAM(S): at 08:19

## 2019-06-08 RX ADMIN — Medication 50 MILLIGRAM(S): at 08:19

## 2019-06-08 RX ADMIN — RISPERIDONE 2 MILLIGRAM(S): 4 TABLET ORAL at 08:19

## 2019-06-09 RX ADMIN — RISPERIDONE 2 MILLIGRAM(S): 4 TABLET ORAL at 08:06

## 2019-06-09 RX ADMIN — Medication 1 MILLIGRAM(S): at 20:22

## 2019-06-09 RX ADMIN — Medication 50 MILLIGRAM(S): at 08:06

## 2019-06-09 RX ADMIN — RISPERIDONE 2 MILLIGRAM(S): 4 TABLET ORAL at 20:22

## 2019-06-09 RX ADMIN — Medication 0.5 MILLIGRAM(S): at 08:06

## 2019-06-09 RX ADMIN — Medication 50 MILLIGRAM(S): at 20:22

## 2019-06-09 RX ADMIN — Medication 1 MILLIGRAM(S): at 08:06

## 2019-06-10 PROCEDURE — 99232 SBSQ HOSP IP/OBS MODERATE 35: CPT

## 2019-06-10 RX ADMIN — Medication 50 MILLIGRAM(S): at 08:25

## 2019-06-10 RX ADMIN — RISPERIDONE 2 MILLIGRAM(S): 4 TABLET ORAL at 20:21

## 2019-06-10 RX ADMIN — Medication 1 MILLIGRAM(S): at 20:21

## 2019-06-10 RX ADMIN — Medication 50 MILLIGRAM(S): at 20:22

## 2019-06-10 RX ADMIN — RISPERIDONE 2 MILLIGRAM(S): 4 TABLET ORAL at 08:25

## 2019-06-10 RX ADMIN — Medication 25 MILLIGRAM(S): at 20:56

## 2019-06-10 RX ADMIN — Medication 1 MILLIGRAM(S): at 08:25

## 2019-06-10 RX ADMIN — Medication 50 MILLIGRAM(S): at 20:21

## 2019-06-10 RX ADMIN — Medication 0.5 MILLIGRAM(S): at 08:25

## 2019-06-10 NOTE — PROGRESS NOTE BEHAVIORAL HEALTH - NSBHFUPINTERVALHXFT_PSY_A_CORE
Pt was seen, evaluated and chart reviewed. Case discussed in tx team meeting.   Patient continues on Enhanced Supervision. Patient has been in variable behavioral control, and is less responsive to staff direction and limits.   Patient is hyperverbal at times, and remains disorganized, with hx of inappropriate behavior, and now has   episodes of verbal agitation.    Denies any SI, HI, AH or VH.  No Rx SE or sx TD/EPS are noted or reported. Patient ambulates around unit, does not attend groups, and is verbal with staff. He is easily frustrated.

## 2019-06-11 PROCEDURE — 99232 SBSQ HOSP IP/OBS MODERATE 35: CPT

## 2019-06-11 RX ORDER — CLONAZEPAM 1 MG
1 TABLET ORAL AT BEDTIME
Refills: 0 | Status: DISCONTINUED | OUTPATIENT
Start: 2019-06-11 | End: 2019-06-11

## 2019-06-11 RX ORDER — CLONAZEPAM 1 MG
1 TABLET ORAL AT BEDTIME
Refills: 0 | Status: DISCONTINUED | OUTPATIENT
Start: 2019-06-11 | End: 2019-06-17

## 2019-06-11 RX ORDER — CLONAZEPAM 1 MG
0.5 TABLET ORAL DAILY
Refills: 0 | Status: DISCONTINUED | OUTPATIENT
Start: 2019-06-11 | End: 2019-06-11

## 2019-06-11 RX ORDER — CLONAZEPAM 1 MG
0.5 TABLET ORAL DAILY
Refills: 0 | Status: DISCONTINUED | OUTPATIENT
Start: 2019-06-11 | End: 2019-06-17

## 2019-06-11 RX ADMIN — HALOPERIDOL DECANOATE 5 MILLIGRAM(S): 100 INJECTION INTRAMUSCULAR at 20:55

## 2019-06-11 RX ADMIN — Medication 1 MILLIGRAM(S): at 20:54

## 2019-06-11 RX ADMIN — Medication 50 MILLIGRAM(S): at 20:56

## 2019-06-11 RX ADMIN — Medication 50 MILLIGRAM(S): at 08:21

## 2019-06-11 RX ADMIN — PALIPERIDONE 156 MILLIGRAM(S): 1.5 TABLET, EXTENDED RELEASE ORAL at 17:54

## 2019-06-11 RX ADMIN — HALOPERIDOL DECANOATE 5 MILLIGRAM(S): 100 INJECTION INTRAMUSCULAR at 08:52

## 2019-06-11 RX ADMIN — Medication 2 MILLIGRAM(S): at 08:52

## 2019-06-11 RX ADMIN — Medication 50 MILLIGRAM(S): at 20:54

## 2019-06-11 RX ADMIN — RISPERIDONE 2 MILLIGRAM(S): 4 TABLET ORAL at 08:21

## 2019-06-11 RX ADMIN — Medication 0.5 MILLIGRAM(S): at 08:21

## 2019-06-11 RX ADMIN — Medication 1 MILLIGRAM(S): at 08:21

## 2019-06-11 RX ADMIN — RISPERIDONE 2 MILLIGRAM(S): 4 TABLET ORAL at 20:54

## 2019-06-11 RX ADMIN — Medication 25 MILLIGRAM(S): at 20:55

## 2019-06-11 NOTE — PROGRESS NOTE BEHAVIORAL HEALTH - NSBHFUPINTERVALHXFT_PSY_A_CORE
Pt was seen, evaluated and chart reviewed. Case discussed in tx team meeting.  No significant interval events are reported, except patient had court hearing today, and per Caden Berger [facility] atty, patient is committed for 6 more months.   Patient continues on Enhanced Supervision. Patient has been in variable behavioral control, and is less responsive to staff direction and limits.   Patient is hyperverbal at times, and remains disorganized, with hx of inappropriate behavior, and now has   episodes of verbal agitation.  Continues delusional, and believes the FBI is preventing him from going home due to spies in the [facility] house.    Denies any SI, HI, AH or VH.  No Rx SE or sx TD/EPS are noted or reported. Patient ambulates around unit, does not attend groups, and is verbal with staff. He is easily frustrated.

## 2019-06-11 NOTE — PROGRESS NOTE BEHAVIORAL HEALTH - OTHER
poor dentition, hyperverbal at times irritable and angry older than stated age irritable verbal behavior at times tenuous, impaired at times

## 2019-06-12 LAB — CK SERPL-CCNC: 206 U/L — SIGNIFICANT CHANGE UP (ref 39–308)

## 2019-06-12 PROCEDURE — 99233 SBSQ HOSP IP/OBS HIGH 50: CPT

## 2019-06-12 RX ORDER — HALOPERIDOL DECANOATE 100 MG/ML
5 INJECTION INTRAMUSCULAR ONCE
Refills: 0 | Status: COMPLETED | OUTPATIENT
Start: 2019-06-12 | End: 2019-06-12

## 2019-06-12 RX ADMIN — HALOPERIDOL DECANOATE 5 MILLIGRAM(S): 100 INJECTION INTRAMUSCULAR at 09:22

## 2019-06-12 RX ADMIN — Medication 50 MILLIGRAM(S): at 20:51

## 2019-06-12 RX ADMIN — Medication 0.5 MILLIGRAM(S): at 08:15

## 2019-06-12 RX ADMIN — Medication 2 MILLIGRAM(S): at 15:23

## 2019-06-12 RX ADMIN — Medication 2 MILLIGRAM(S): at 09:23

## 2019-06-12 RX ADMIN — Medication 1 MILLIGRAM(S): at 20:50

## 2019-06-12 RX ADMIN — HALOPERIDOL DECANOATE 5 MILLIGRAM(S): 100 INJECTION INTRAMUSCULAR at 15:24

## 2019-06-12 RX ADMIN — Medication 1 MILLIGRAM(S): at 08:15

## 2019-06-12 RX ADMIN — Medication 50 MILLIGRAM(S): at 08:15

## 2019-06-12 RX ADMIN — Medication 1 DROP(S): at 12:10

## 2019-06-12 RX ADMIN — RISPERIDONE 2 MILLIGRAM(S): 4 TABLET ORAL at 20:51

## 2019-06-12 RX ADMIN — RISPERIDONE 2 MILLIGRAM(S): 4 TABLET ORAL at 08:15

## 2019-06-12 RX ADMIN — Medication 1 EACH: at 12:10

## 2019-06-12 NOTE — PROGRESS NOTE BEHAVIORAL HEALTH - OTHER
older than stated age irritable verbal behavior at times, hostile to staff at times tenuous, impaired at times yelling at times poor dentition, hyperverbal at times irritable and angry

## 2019-06-13 PROCEDURE — 99233 SBSQ HOSP IP/OBS HIGH 50: CPT

## 2019-06-13 RX ORDER — HALOPERIDOL DECANOATE 100 MG/ML
5 INJECTION INTRAMUSCULAR ONCE
Refills: 0 | Status: COMPLETED | OUTPATIENT
Start: 2019-06-13 | End: 2019-06-13

## 2019-06-13 RX ORDER — HALOPERIDOL DECANOATE 100 MG/ML
5 INJECTION INTRAMUSCULAR EVERY 6 HOURS
Refills: 0 | Status: DISCONTINUED | OUTPATIENT
Start: 2019-06-13 | End: 2019-06-20

## 2019-06-13 RX ADMIN — Medication 0.5 MILLIGRAM(S): at 08:15

## 2019-06-13 RX ADMIN — RISPERIDONE 2 MILLIGRAM(S): 4 TABLET ORAL at 08:15

## 2019-06-13 RX ADMIN — Medication 50 MILLIGRAM(S): at 08:15

## 2019-06-13 RX ADMIN — Medication 2 MILLIGRAM(S): at 09:52

## 2019-06-13 RX ADMIN — HALOPERIDOL DECANOATE 5 MILLIGRAM(S): 100 INJECTION INTRAMUSCULAR at 09:52

## 2019-06-13 RX ADMIN — Medication 2 MILLIGRAM(S): at 11:37

## 2019-06-13 RX ADMIN — HALOPERIDOL DECANOATE 5 MILLIGRAM(S): 100 INJECTION INTRAMUSCULAR at 11:38

## 2019-06-13 RX ADMIN — Medication 1 MILLIGRAM(S): at 08:15

## 2019-06-13 NOTE — PROGRESS NOTE BEHAVIORAL HEALTH - NSBHFUPINTERVALHXFT_PSY_A_CORE
Pt was seen, evaluated and chart reviewed. Case discussed in tx team meeting.  No significant interval events are reported, except that patient continues very angry and verbally abusive/threatening to staff.  Was given prn Haldol and Ativan IM, and was cooperative with the injection.    Patient continues on Enhanced Supervision. Patient has been in poor behavioral control, and is much less responsive to staff direction and limits.   Patient is hyperverbal at times, and remains disorganized, with hx of inappropriate behavior, and now has increased episodes of verbal agitation and threatening staff. .  Continues delusional, and believes the FBI is trying to keep him from going to his [facility]  home, , and he needs a taxi to take him home.   Denies any SI, HI, AH or VH.  No Rx SE or sx TD/EPS are noted or reported. Patient ambulates around unit, does not attend groups, and is verbal with staff. He is easily frustrated and angered.

## 2019-06-14 PROCEDURE — 99232 SBSQ HOSP IP/OBS MODERATE 35: CPT

## 2019-06-14 RX ADMIN — Medication 1 MILLIGRAM(S): at 08:07

## 2019-06-14 RX ADMIN — Medication 1 MILLIGRAM(S): at 20:38

## 2019-06-14 RX ADMIN — Medication 50 MILLIGRAM(S): at 00:58

## 2019-06-14 RX ADMIN — Medication 50 MILLIGRAM(S): at 20:38

## 2019-06-14 RX ADMIN — Medication 1 MILLIGRAM(S): at 00:58

## 2019-06-14 RX ADMIN — RISPERIDONE 2 MILLIGRAM(S): 4 TABLET ORAL at 20:38

## 2019-06-14 RX ADMIN — Medication 0.5 MILLIGRAM(S): at 08:07

## 2019-06-14 RX ADMIN — Medication 1 MILLIGRAM(S): at 00:59

## 2019-06-14 RX ADMIN — RISPERIDONE 2 MILLIGRAM(S): 4 TABLET ORAL at 08:07

## 2019-06-14 RX ADMIN — Medication 50 MILLIGRAM(S): at 08:07

## 2019-06-14 RX ADMIN — Medication 2 MILLIGRAM(S): at 01:55

## 2019-06-14 RX ADMIN — HALOPERIDOL DECANOATE 5 MILLIGRAM(S): 100 INJECTION INTRAMUSCULAR at 01:56

## 2019-06-14 RX ADMIN — Medication 1 EACH: at 09:57

## 2019-06-14 RX ADMIN — RISPERIDONE 2 MILLIGRAM(S): 4 TABLET ORAL at 00:59

## 2019-06-14 NOTE — PROGRESS NOTE BEHAVIORAL HEALTH - NSBHFUPINTERVALHXFT_PSY_A_CORE
Pt was seen, evaluated and chart reviewed. Case discussed in tx team meeting.  No significant interval events are reported, except that patient exhibits improved behavioral control today.    Patient continues on Enhanced Supervision. Patient has been in better behavioral control, and is more responsive to staff direction and limits.   Patient is hyperverbal at times, and remains disorganized, with hx of inappropriate behavior.  Fresh [employer] walks reinstated. . .  Continues delusional, and believes the FBI is wants him to ejaculate so he can behave.   Denies any SI, HI, AH or VH.  No Rx SE or sx TD/EPS are noted or reported. Patient ambulates around unit, does not attend groups, and is verbal with staff.

## 2019-06-14 NOTE — CONSULT NOTE ADULT - SUBJECTIVE AND OBJECTIVE BOX
Patient seen at bedside in [facility].  Patients speech was slurred but was able to communicate.  Patient offered no complains of pain or pedal problems.  Consulted for mycotic toenails.  The patient's toenails were thick, discolored and mycotic in nature.      Palpable pedal pulses were noted bilateral  Capillary refill time was noted to be 1 second x10  There was no edema, no erythema, no calor and no active signs of gross infection  Mycotic toenails were noted which were thick with subungual debris    Reviewed labs, PMH, PSH, MEDs and allergies.

## 2019-06-14 NOTE — PROGRESS NOTE BEHAVIORAL HEALTH - OTHER
poor dentition, hyperverbal at times older than stated age irritable verbal behavior at times, cooperative at other times tenuous, impaired at times yelling at times

## 2019-06-15 RX ADMIN — Medication 2 MILLIGRAM(S): at 02:50

## 2019-06-15 RX ADMIN — RISPERIDONE 2 MILLIGRAM(S): 4 TABLET ORAL at 20:50

## 2019-06-15 RX ADMIN — Medication 1 MILLIGRAM(S): at 20:50

## 2019-06-15 RX ADMIN — Medication 25 MILLIGRAM(S): at 20:51

## 2019-06-15 RX ADMIN — Medication 0.5 MILLIGRAM(S): at 09:34

## 2019-06-15 RX ADMIN — Medication 50 MILLIGRAM(S): at 09:35

## 2019-06-15 RX ADMIN — Medication 1 MILLIGRAM(S): at 09:34

## 2019-06-15 RX ADMIN — Medication 50 MILLIGRAM(S): at 20:51

## 2019-06-15 RX ADMIN — RISPERIDONE 2 MILLIGRAM(S): 4 TABLET ORAL at 09:34

## 2019-06-16 RX ADMIN — Medication 50 MILLIGRAM(S): at 08:22

## 2019-06-16 RX ADMIN — Medication 1 MILLIGRAM(S): at 08:22

## 2019-06-16 RX ADMIN — Medication 50 MILLIGRAM(S): at 20:39

## 2019-06-16 RX ADMIN — Medication 1 MILLIGRAM(S): at 20:39

## 2019-06-16 RX ADMIN — RISPERIDONE 2 MILLIGRAM(S): 4 TABLET ORAL at 20:39

## 2019-06-16 RX ADMIN — RISPERIDONE 2 MILLIGRAM(S): 4 TABLET ORAL at 08:22

## 2019-06-16 RX ADMIN — Medication 0.5 MILLIGRAM(S): at 08:22

## 2019-06-17 PROCEDURE — 99232 SBSQ HOSP IP/OBS MODERATE 35: CPT

## 2019-06-17 RX ORDER — PALIPERIDONE 1.5 MG/1
156 TABLET, EXTENDED RELEASE ORAL ONCE
Refills: 0 | Status: CANCELLED | OUTPATIENT
Start: 2019-07-02 | End: 2019-06-20

## 2019-06-17 RX ORDER — CLONAZEPAM 1 MG
0.5 TABLET ORAL DAILY
Refills: 0 | Status: DISCONTINUED | OUTPATIENT
Start: 2019-06-17 | End: 2019-06-20

## 2019-06-17 RX ORDER — CLONAZEPAM 1 MG
1 TABLET ORAL AT BEDTIME
Refills: 0 | Status: DISCONTINUED | OUTPATIENT
Start: 2019-06-17 | End: 2019-06-20

## 2019-06-17 RX ADMIN — Medication 0.5 MILLIGRAM(S): at 08:42

## 2019-06-17 RX ADMIN — Medication 1 MILLIGRAM(S): at 21:11

## 2019-06-17 RX ADMIN — Medication 50 MILLIGRAM(S): at 08:42

## 2019-06-17 RX ADMIN — RISPERIDONE 2 MILLIGRAM(S): 4 TABLET ORAL at 08:42

## 2019-06-17 RX ADMIN — Medication 50 MILLIGRAM(S): at 21:12

## 2019-06-17 RX ADMIN — RISPERIDONE 2 MILLIGRAM(S): 4 TABLET ORAL at 21:11

## 2019-06-17 RX ADMIN — Medication 1 MILLIGRAM(S): at 08:42

## 2019-06-17 NOTE — PROGRESS NOTE BEHAVIORAL HEALTH - OTHER
older than stated age irritable verbal behavior at times, cooperative at other times tenuous, impaired at times yelling at times poor dentition, hyperverbal at times "marvelous, wonderful"

## 2019-06-17 NOTE — PROGRESS NOTE BEHAVIORAL HEALTH - NSBHFUPINTERVALHXFT_PSY_A_CORE
Pt was seen, evaluated and chart reviewed. Case discussed in tx team meeting.  No significant interval events are reported, except that patient exhibits improved behavioral control today.    Patient continues on Enhanced Supervision. Patient has been in better behavioral control, and is more responsive to staff direction and limits.   Patient is hyperverbal at times, and remains disorganized, with hx of inappropriate behavior.  Fresh Air walks reinstated. . .  Continues delusional, and believes the FBI is staying at [employer], and he can go there.  Explained to patient that [employer] is no longer is existence.   Denies any SI, HI, AH or VH.  No Rx SE or sx TD/EPS are noted or reported. Patient ambulates around unit, does not attend groups, and is verbal with staff.  Increase Invega Sustenna to q3 weeks due to episodes of acting out behavior.

## 2019-06-18 PROCEDURE — 99232 SBSQ HOSP IP/OBS MODERATE 35: CPT

## 2019-06-18 RX ADMIN — Medication 50 MILLIGRAM(S): at 08:42

## 2019-06-18 RX ADMIN — RISPERIDONE 2 MILLIGRAM(S): 4 TABLET ORAL at 08:42

## 2019-06-18 RX ADMIN — RISPERIDONE 2 MILLIGRAM(S): 4 TABLET ORAL at 21:01

## 2019-06-18 RX ADMIN — Medication 1 MILLIGRAM(S): at 08:42

## 2019-06-18 RX ADMIN — Medication 1 MILLIGRAM(S): at 21:00

## 2019-06-18 RX ADMIN — Medication 0.5 MILLIGRAM(S): at 08:42

## 2019-06-18 RX ADMIN — Medication 50 MILLIGRAM(S): at 21:01

## 2019-06-18 NOTE — PROGRESS NOTE BEHAVIORAL HEALTH - NSBHFUPINTERVALHXFT_PSY_A_CORE
Pt was seen, evaluated and chart reviewed. Case discussed in tx team meeting.  No significant interval events are reported, except that patient exhibits some  improved behavioral control today.    Patient continues on Enhanced Supervision. Patient has been in better behavioral control, and is more responsive to staff direction and limits.   Patient is hyperverbal at times, and remains disorganized, with hx of inappropriate behavior. and pacing with hyperverbal angry speech.  Fresh Air walks reinstated. . .  Continues delusional, and believes the FBI is staying at the [facility], and he can go there.   Denies any SI, HI, AH or VH.  No Rx SE or sx TD/EPS are noted or reported. Patient ambulates around unit, does not attend groups, and is verbal with staff.  Increase Invega Sustenna to q3 weeks due to episodes of acting out behavior.

## 2019-06-18 NOTE — PROGRESS NOTE BEHAVIORAL HEALTH - OTHER
older than stated age irritable verbal behavior at times, cooperative at other times tenuous, impaired at times yelling at times poor dentition, hyperverbal at times "ok"

## 2019-06-19 PROCEDURE — 99233 SBSQ HOSP IP/OBS HIGH 50: CPT

## 2019-06-19 RX ORDER — HALOPERIDOL DECANOATE 100 MG/ML
5 INJECTION INTRAMUSCULAR
Refills: 0 | Status: DISCONTINUED | OUTPATIENT
Start: 2019-06-20 | End: 2019-06-20

## 2019-06-19 RX ORDER — TRAZODONE HCL 50 MG
1 TABLET ORAL
Qty: 0 | Refills: 0 | DISCHARGE
Start: 2019-06-19

## 2019-06-19 RX ORDER — HALOPERIDOL DECANOATE 100 MG/ML
1 INJECTION INTRAMUSCULAR
Qty: 0 | Refills: 0 | DISCHARGE
Start: 2019-06-19

## 2019-06-19 RX ORDER — CLONAZEPAM 1 MG
1 TABLET ORAL
Qty: 0 | Refills: 0 | DISCHARGE
Start: 2019-06-19

## 2019-06-19 RX ORDER — POLYETHYLENE GLYCOL 3350 17 G/17G
17 POWDER, FOR SOLUTION ORAL
Qty: 0 | Refills: 0 | DISCHARGE
Start: 2019-06-19

## 2019-06-19 RX ORDER — MAGNESIUM HYDROXIDE 400 MG/1
30 TABLET, CHEWABLE ORAL
Qty: 0 | Refills: 0 | DISCHARGE
Start: 2019-06-19

## 2019-06-19 RX ORDER — SOD,AMMONIUM,POTASSIUM LACTATE
1 CREAM (GRAM) TOPICAL
Qty: 0 | Refills: 0 | DISCHARGE
Start: 2019-06-19

## 2019-06-19 RX ORDER — HALOPERIDOL DECANOATE 100 MG/ML
5 INJECTION INTRAMUSCULAR
Qty: 0 | Refills: 0 | DISCHARGE
Start: 2019-06-19

## 2019-06-19 RX ORDER — METOPROLOL TARTRATE 50 MG
1 TABLET ORAL
Qty: 0 | Refills: 0 | DISCHARGE
Start: 2019-06-19

## 2019-06-19 RX ORDER — PALIPERIDONE 1.5 MG/1
156 TABLET, EXTENDED RELEASE ORAL
Qty: 0 | Refills: 0 | DISCHARGE
Start: 2019-06-19

## 2019-06-19 RX ORDER — DIPHENHYDRAMINE HCL 50 MG
1 CAPSULE ORAL
Qty: 0 | Refills: 0 | DISCHARGE
Start: 2019-06-19

## 2019-06-19 RX ORDER — BENZTROPINE MESYLATE 1 MG
1 TABLET ORAL
Qty: 0 | Refills: 0 | DISCHARGE
Start: 2019-06-19

## 2019-06-19 RX ORDER — RISPERIDONE 4 MG/1
1 TABLET ORAL
Qty: 0 | Refills: 0 | DISCHARGE
Start: 2019-06-19

## 2019-06-19 RX ADMIN — Medication 50 MILLIGRAM(S): at 20:21

## 2019-06-19 RX ADMIN — Medication 0.5 MILLIGRAM(S): at 09:37

## 2019-06-19 RX ADMIN — RISPERIDONE 2 MILLIGRAM(S): 4 TABLET ORAL at 20:21

## 2019-06-19 RX ADMIN — RISPERIDONE 2 MILLIGRAM(S): 4 TABLET ORAL at 09:37

## 2019-06-19 RX ADMIN — Medication 1 MILLIGRAM(S): at 20:21

## 2019-06-19 RX ADMIN — Medication 1 MILLIGRAM(S): at 09:37

## 2019-06-19 RX ADMIN — Medication 50 MILLIGRAM(S): at 09:37

## 2019-06-19 NOTE — PROGRESS NOTE BEHAVIORAL HEALTH - NSBHFUPINTERVALHXFT_PSY_A_CORE
Pt was seen, evaluated and chart reviewed. Case discussed in tx team meeting.  No significant interval events are reported, except that patient exhibits some  improved behavioral control today.    Patient continues on Enhanced Supervision. Patient has been in somewhat improved  behavioral control, and is more responsive to staff direction and limits.   Patient is hyperverbal at times, and remains disorganized, with hx of inappropriate behavior. and today patient has episodes of  pacing on unit with hyperverbal angry speech.  Fresh Air walks reinstated. . .  Continues delusional, and believes the FBI is after him because he masturbates and ejaculates. .   Denies any SI, HI, AH or VH.  No Rx SE or sx TD/EPS are noted or reported. Patient ambulates around unit, does not attend groups, and is verbal with staff. Pt was seen, evaluated and chart reviewed. Case discussed in tx team meeting.  No significant interval events are reported, except that patient exhibits some  improved behavioral control today.    Patient continues on Enhanced Supervision. Patient has been in somewhat improved  behavioral control, and is more responsive to staff direction and limits.   Patient is hyperverbal at times, and remains disorganized, with hx of inappropriate behavior. and today patient has episodes of  pacing on unit with hyperverbal angry speech.  Fresh Air walks reinstated. . .  Continues delusional, and believes the FBI is after him because he masturbates and ejaculates. .   Denies any SI, HI, AH or VH.  No Rx SE or sx TD/EPS are noted or reported. Patient ambulates around unit, does not attend groups, and is verbal with staff.  Met with patient's sister to discuss patient's transfer to [facility].  She is ok with this, and discusses that she hopes he can go back to [facility] eventually.  She is supportive of patient, and agrees with current tx plan.

## 2019-06-19 NOTE — DISCHARGE NOTE BEHAVIORAL HEALTH - HPI (INCLUDE ILLNESS QUALITY, SEVERITY, DURATION, TIMING, CONTEXT, MODIFYING FACTORS, ASSOCIATED SIGNS AND SYMPTOMS)
Patient is a 62 y/o M with reported hx of schizophrenia sent in from [facility] residence for report of agitation.  Was agitated in [place] ED, and transferred to [facility] for inpatient psychiatric admission for safety and tx. Was initially started on Seroquel, changed to Haldol, then to Risperdal. More verbal on 4/8, increased Risperdal to 1mg bid.  on 4/9, increased Risperdal to 1qam, 2mg hs.  Improved sx. Began Invega Sustenna on 4/11. Combative behavior on 4/12, stat Haldol and Ativan IM with good results. Patient continues on enhanced supervision for safety due to angry, irritable mood and psychotic sx. He continues to be irritable at times and needed prn Haldol and Ativan. PT does appear restless and did benefit from a small dose of Clonazepam for possible akathisia.   Started on Clonazepam 0.25mg po daily  for restlessness secondary to antipsychotics due to poor sleep, Klonopin increased to 1mg at hs. Invega Sustenna 156mg given 4/16. Trazodone 50mg po q 6pm and may have another prn of 50mg at qhs if needed.   Risperidone 2mg BID   Haldol 5mg/Ativan 2mg prns    Invega Sustenna started 5/15, excessive drooling    Atropine gtts SL ordered.    Cogentin started as standing due to hand tremors with improved sx noted.  Pt cannot return to [facility].  will be applying for [facility]  Invega Sustena Inj due 7/2.  Court retention for 6 mnths on 6/11..for transfer to [facility] on 6/20.

## 2019-06-19 NOTE — DISCHARGE NOTE BEHAVIORAL HEALTH - NSBHDCLABSFT_PSY_A_CORE
1313  Pt is awake, alert, oriented x 4. Sitting at edge of bed and eating breakfast.  Dressing to anterior neck dry and intact. Wearing a hard c collar. Pt has  TLS drain with small amt of  Pinkish serous drainage. 9:57 AM   Pt sitting at edge of bed. Pain level 6/10. Percocet 5/325 mg po given. Lungs are clear. Abdomen with active BS.  1000  Pt ambulated with RN in hallway. Did well with ambulation. No dizziness noted. 12:13 PM Pt resting in bed. Pain level 5-6/10. Will take 2 tabs Percocet instead of 1 next time available. 1330  TLS drain removed. Dressing removed. Incision intact with glue. Gauze and tegaderm applied. 1:56 PM   Pt medicated with Percocet 5/325 2 tabs po for pain level 6/10.   1532  Discharge instructions given by Lawrence Castle RN. Pt discharged per wheelchair accompanied by wife. suggest repeat HgbA1C in 2 months

## 2019-06-19 NOTE — DISCHARGE NOTE BEHAVIORAL HEALTH - MEDICATION SUMMARY - MEDICATIONS TO TAKE
I will START or STAY ON the medications listed below when I get home from the hospital:    magnesium hydroxide 8% oral suspension  -- 30 milliliter(s) by mouth once a day, As needed, Constipation  -- Indication: For constipation    LORazepam 2 mg oral tablet  -- 1 tab(s) by mouth every 6 hours, As needed, Agitation  -- Indication: For agitation    LORazepam  -- 2 milligram(s) intramuscular every 6 hours, As Needed for agitation  -- Indication: For agitation    clonazePAM 1 mg oral tablet  -- 1 tab(s) by mouth once a day (at bedtime)  -- Indication: For insomnia    clonazePAM 0.5 mg oral tablet  -- 1 tab(s) by mouth once a day  -- Indication: For EPS    traZODone 50 mg oral tablet  -- 1 tab(s) by mouth   -- Indication: For insomnia    traZODone 50 mg oral tablet  -- 1 tab(s) by mouth once a day (at bedtime), As needed, insomnia  -- Indication: For insomnia    diphenhydrAMINE 25 mg oral capsule  -- 1 cap(s) by mouth every 6 hours, As needed, Extrapyramidal sx  -- Indication: For EPS    benztropine 1 mg oral tablet  -- 1 tab(s) by mouth 2 times a day  -- Indication: For EPS    haloperidol 5 mg oral tablet  -- 1 tab(s) by mouth every 6 hours, As needed, agitation  -- Indication: For Residual schizophrenia    haloperidol  -- 5 milligram(s) intramuscularly every 6 hours, As Needed agitation  -- Indication: For Residual schizophrenia    risperiDONE 2 mg oral tablet  -- 1 tab(s) by mouth 2 times a day  -- Indication: For Residual schizophrenia    paliperidone  -- 156 milligram(s) intramuscular every three weeks--next dose due 7/2  -- Indication: For Residual schizophrenia    metoprolol succinate 50 mg oral tablet, extended release  -- 1 tab(s) by mouth once a day  -- Indication: For Essential hypertension    ammonium lactate 12% topical lotion  -- 1 application on skin 2 times a day, As needed, dry skin  -- Indication: For dry skin    polyethylene glycol 3350 oral powder for reconstitution  -- 17 gram(s) by mouth once a day, As needed, Constipation  -- Indication: For constipation

## 2019-06-19 NOTE — PROGRESS NOTE BEHAVIORAL HEALTH - OTHER
"upset with the FBI" older than stated age irritable verbal behavior at times, cooperative at other times tenuous, impaired at times yelling at times poor dentition, hyperverbal at times

## 2019-06-19 NOTE — DISCHARGE NOTE BEHAVIORAL HEALTH - NSBHDCVIOLSAFETYFT_PSY_A_CORE
Patient to be monitored at  [facility].   Patient denies any current SI or HI, but is at risk for aggressive behavior due to psychosis

## 2019-06-19 NOTE — DISCHARGE NOTE BEHAVIORAL HEALTH - NSBHDCDXVALIDYESFT_PSY_A_CORE
patient has ongoing delusions, and disorganized speech.  He has constricted affect, alogia and avolition

## 2019-06-19 NOTE — DISCHARGE NOTE BEHAVIORAL HEALTH - NSBHDCMEDSFT_PSY_A_CORE
on Risperdal and Invega Sustenna with fair results  on Haldol and Ativan prn for agitated behavior with fair results  on Klonopin and Cogentin for Rx SE

## 2019-06-19 NOTE — DISCHARGE NOTE BEHAVIORAL HEALTH - NSBHDCCRISISPLAN1FT_PSY_A_CORE
use healthy coping skills learned on the unit (listening to music, playing guitar/drums, talking to supports.

## 2019-06-20 VITALS
SYSTOLIC BLOOD PRESSURE: 139 MMHG | RESPIRATION RATE: 18 BRPM | TEMPERATURE: 98 F | OXYGEN SATURATION: 94 % | HEART RATE: 94 BPM | DIASTOLIC BLOOD PRESSURE: 88 MMHG

## 2019-06-20 PROCEDURE — 82962 GLUCOSE BLOOD TEST: CPT

## 2019-06-20 PROCEDURE — 80061 LIPID PANEL: CPT

## 2019-06-20 PROCEDURE — 36415 COLL VENOUS BLD VENIPUNCTURE: CPT

## 2019-06-20 PROCEDURE — 83036 HEMOGLOBIN GLYCOSYLATED A1C: CPT

## 2019-06-20 PROCEDURE — 81001 URINALYSIS AUTO W/SCOPE: CPT

## 2019-06-20 PROCEDURE — 85027 COMPLETE CBC AUTOMATED: CPT

## 2019-06-20 PROCEDURE — 80053 COMPREHEN METABOLIC PANEL: CPT

## 2019-06-20 PROCEDURE — 82550 ASSAY OF CK (CPK): CPT

## 2019-06-20 PROCEDURE — 80048 BASIC METABOLIC PNL TOTAL CA: CPT

## 2019-06-20 PROCEDURE — 86780 TREPONEMA PALLIDUM: CPT

## 2019-06-20 PROCEDURE — 82947 ASSAY GLUCOSE BLOOD QUANT: CPT

## 2019-06-20 PROCEDURE — 99239 HOSP IP/OBS DSCHRG MGMT >30: CPT

## 2019-06-20 PROCEDURE — 97161 PT EVAL LOW COMPLEX 20 MIN: CPT

## 2019-06-20 RX ADMIN — Medication 2 MILLIGRAM(S): at 05:38

## 2019-06-20 RX ADMIN — RISPERIDONE 2 MILLIGRAM(S): 4 TABLET ORAL at 08:32

## 2019-06-20 RX ADMIN — Medication 0.5 MILLIGRAM(S): at 08:32

## 2019-06-20 RX ADMIN — Medication 25 MILLIGRAM(S): at 07:08

## 2019-06-20 RX ADMIN — HALOPERIDOL DECANOATE 5 MILLIGRAM(S): 100 INJECTION INTRAMUSCULAR at 05:38

## 2019-06-20 RX ADMIN — Medication 50 MILLIGRAM(S): at 08:32

## 2019-06-20 RX ADMIN — Medication 1 MILLIGRAM(S): at 08:32

## 2019-06-20 NOTE — PROGRESS NOTE BEHAVIORAL HEALTH - INSIGHT (REGARDING PSYCHIATRIC ILLNESS)
Poor
Other
Poor
Other
Other
Poor

## 2019-06-20 NOTE — PROGRESS NOTE BEHAVIORAL HEALTH - NSBHCONSORIP_PSY_A_CORE
Inpatient Admission...

## 2019-06-20 NOTE — PROGRESS NOTE BEHAVIORAL HEALTH - ORIENTED TO TIME
No
Other
No
Other
No
Yes
Yes
No
Other
No

## 2019-06-20 NOTE — PROGRESS NOTE BEHAVIORAL HEALTH - NS ED BHA MSE SPEECH VOLUME
Normal
Normal
Normal/Loud
Normal
Normal/Loud/Other
Normal
Soft/Normal
Normal
Other/Normal
Loud/Normal/Other
Normal
Loud/Other/Normal
Normal
Normal/Soft
Loud/Normal/Other
Normal
Normal/Loud
Normal/Other/Loud
Other/Normal/Loud
Other/Normal/Loud
Other/Soft
Soft/Other
Normal
Other/Soft
Normal
Other/Normal
Normal
Other/Normal/Loud
Normal
Other/Normal
Normal
Normal
Other
Normal
Normal
Normal/Loud/Other
Other/Soft
Loud/Normal
Normal/Soft
Normal/Other
Normal/Other
Normal

## 2019-06-20 NOTE — PROGRESS NOTE BEHAVIORAL HEALTH - NSBHFUPINTERVALHXFT_PSY_A_CORE
Pt was seen, evaluated and chart reviewed. Case discussed in tx team meeting on 6/19, and all tx team members agreed that patient was appropriate for transfer to [facility].  Case discussed with staff today.    No significant interval events are reported. . Patient has been in somewhat improved  behavioral control, and is more responsive to staff direction and limits.   Patient is hyperverbal at times, and remains disorganized, with hx of inappropriate behavior. and patient has episodes of  pacing on unit with hyperverbal angry speech.  Fresh [employer] walks reinstated. . .  Continues delusional, and believes the FBI is in the [facility] and preventing him from going there. He is agreeable with going to [facility]. .   Denies any SI, HI, AH or VH.  No Rx SE or sx TD/EPS are noted or reported, except some bilateral hand tremors. Patient ambulates around unit, does not attend groups, and is verbal with staff.  Patient is in behavioral control at this time, and is appropriate for transfer to [facility].  Discharge patient to [facility] today. Pt was seen, evaluated and chart reviewed. Case discussed in tx team meeting on 6/19, and all tx team members agreed that patient was appropriate for transfer to [facility].  Case discussed with staff today and staff feels patient is safe for transfer.    No significant interval events are reported. . Patient has been in somewhat improved  behavioral control, and is more responsive to staff direction and limits.   Patient is hyperverbal at times, and remains disorganized, with hx of inappropriate behavior. and patient has episodes of  pacing on unit with hyperverbal angry speech.   Continues delusional, and believes the FBI is in the [facility] and preventing him from going there. He is agreeable with going to [facility]. .   Denies any SI, HI, AH or VH.  No Rx SE or sx TD/EPS are noted or reported, except some bilateral hand tremors. Patient ambulates around unit, does not attend groups, and is verbal with staff.  Patient is in behavioral control at this time, and is appropriate for transfer to [facility].  Discharge patient to [facility] today.

## 2019-06-20 NOTE — PROGRESS NOTE BEHAVIORAL HEALTH - ORIENTED TO PLACE
Yes
Other
Yes
Other
Yes
Other
Yes

## 2019-06-20 NOTE — PROGRESS NOTE BEHAVIORAL HEALTH - BEHAVIOR
Cooperative
Hostile/Other/Cooperative
Cooperative
Other/Cooperative
Other/Cooperative
Cooperative
Other/Cooperative
Uncooperative/Hostile
Other/Cooperative
Cooperative
Cooperative/Other
Cooperative
Cooperative/Hostile/Other/Uncooperative
Hostile/Uncooperative
Cooperative
Cooperative
Cooperative/Other
Other/Cooperative
Cooperative
Cooperative/Hostile/Other
Cooperative/Other
Other/Cooperative
Other/Cooperative
Uncooperative/Hostile
Cooperative
Cooperative/Other
Hostile/Uncooperative
Other/Cooperative
Uncooperative/Hostile
Uncooperative/Hostile
Cooperative
Hostile/Uncooperative
Uncooperative/Hostile
Other/Cooperative
Cooperative
Cooperative/Hostile/Other
Cooperative/Other
Cooperative
Other
Cooperative/Other
Cooperative
Cooperative/Other
Other/Cooperative
Cooperative
Cooperative/Other
Other/Cooperative
Cooperative
Other/Cooperative
Hostile/Uncooperative
Cooperative
Other/Cooperative
Hostile/Uncooperative
Cooperative/Other
Cooperative

## 2019-06-20 NOTE — PROGRESS NOTE BEHAVIORAL HEALTH - NSBHADMITMEDEDU_PSY_A_CORE
yes...
no
yes...
no
yes...
yes...

## 2019-06-20 NOTE — PROGRESS NOTE BEHAVIORAL HEALTH - NSBHCHARTREVIEWINVESTIGATE_PSY_A_CORE FT
Ventricular Rate 109 BPM  Atrial Rate 109 BPM  P-R Interval 148 ms    QRS Duration 92 ms    Q-T Interval 340 ms    QTC Calculation(Bezet) 457 ms    P Axis 53 degrees    R Axis 18 degrees  T Axis 47 degrees  Diagnosis Line Sinus tachycardia with Premature supraventricular complexes  Incomplete right bundle branch block  Borderline ECG    Confirmed by SOTERO CAIN ([id number]) on 4/7/2019 7:51:16 PM
Borderline EKG
Ventricular Rate 109 BPM  Atrial Rate 109 BPM  P-R Interval 148 ms    QRS Duration 92 ms    Q-T Interval 340 ms    QTC Calculation(Bezet) 457 ms    P Axis 53 degrees    R Axis 18 degrees  T Axis 47 degrees  Diagnosis Line Sinus tachycardia with Premature supraventricular complexes  Incomplete right bundle branch block  Borderline ECG    Confirmed by SOETRO CAIN ([id number]) on 4/7/2019 7:51:16 PM
Ventricular Rate 109 BPM  Atrial Rate 109 BPM  P-R Interval 148 ms    QRS Duration 92 ms    Q-T Interval 340 ms    QTC Calculation(Bezet) 457 ms    P Axis 53 degrees    R Axis 18 degrees  T Axis 47 degrees  Diagnosis Line Sinus tachycardia with Premature supraventricular complexes  Incomplete right bundle branch block  Borderline ECG    Confirmed by SOTERO CAIN ([id number]) on 4/7/2019 7:51:16 PM
Ventricular Rate 109 BPM  Atrial Rate 109 BPM  P-R Interval 148 ms    QRS Duration 92 ms    Q-T Interval 340 ms    QTC Calculation(Bezet) 457 ms    P Axis 53 degrees    R Axis 18 degrees  T Axis 47 degrees  Diagnosis Line Sinus tachycardia with Premature supraventricular complexes  Incomplete right bundle branch block  Borderline ECG    Confirmed by SOTERO CAIN ([id number]) on 4/7/2019 7:51:16 PM
Borderline EKG
Ventricular Rate 109 BPM  Atrial Rate 109 BPM    P-R Interval 148 ms  QRS Duration 92 ms    Q-T Interval 340 ms    QTC Calculation(Bezet) 457 ms    P Axis 53 degrees    R Axis 18 degrees    T Axis 47 degrees    Diagnosis Line Sinus tachycardia with Premature supraventricular complexes  Incomplete right bundle branch block  Borderline ECG    Confirmed by SOTERO CAIN ([id number]) on 4/7/2019 7:51:16 PM
Borderline EKG
Ventricular Rate 109 BPM  Atrial Rate 109 BPM    P-R Interval 148 ms  QRS Duration 92 ms    Q-T Interval 340 ms    QTC Calculation(Bezet) 457 ms    P Axis 53 degrees    R Axis 18 degrees    T Axis 47 degrees    Diagnosis Line Sinus tachycardia with Premature supraventricular complexes  Incomplete right bundle branch block  Borderline ECG    Confirmed by SOTERO CAIN ([id number]) on 4/7/2019 7:51:16 PM
Borderline EKG
Ventricular Rate 109 BPM  Atrial Rate 109 BPM  P-R Interval 148 ms    QRS Duration 92 ms    Q-T Interval 340 ms    QTC Calculation(Bezet) 457 ms    P Axis 53 degrees    R Axis 18 degrees  T Axis 47 degrees  Diagnosis Line Sinus tachycardia with Premature supraventricular complexes  Incomplete right bundle branch block  Borderline ECG    Confirmed by SOTERO CAIN ([id number]) on 4/7/2019 7:51:16 PM
Borderline EKG
Ventricular Rate 109 BPM  Atrial Rate 109 BPM    P-R Interval 148 ms  QRS Duration 92 ms    Q-T Interval 340 ms    QTC Calculation(Bezet) 457 ms    P Axis 53 degrees    R Axis 18 degrees    T Axis 47 degrees    Diagnosis Line Sinus tachycardia with Premature supraventricular complexes  Incomplete right bundle branch block  Borderline ECG    Confirmed by SOTERO CAIN ([id number]) on 4/7/2019 7:51:16 PM

## 2019-06-20 NOTE — PROGRESS NOTE BEHAVIORAL HEALTH - AFFECT RANGE
Constricted
Other
Constricted
Constricted/Blunted
Constricted
Labile
Constricted
Labile
Constricted
Labile
Constricted
Constricted
Labile
Constricted

## 2019-06-20 NOTE — PROGRESS NOTE BEHAVIORAL HEALTH - NSBHADMITDANGEROTHERS_PSY_A_CORE
high risk for assault
assaultive behavior/in control today
high risk for assault
assaultive behavior/in control today
high risk for assault
assaultive behavior/in control today
high risk for assault
assaultive behavior/in control today
high risk for assault
assaultive behavior/in control today
assaultive behavior/in control today
high risk for assault
in control today/assaultive behavior
assaultive behavior
high risk for assault
high risk for assault
assaultive behavior/in control today
high risk for assault
aggressive behavior/high risk for assault

## 2019-06-20 NOTE — PROGRESS NOTE BEHAVIORAL HEALTH - IMPULSE CONTROL
Impaired/Other
Other
Other/Normal
Impaired/Other
Normal/Other
Impaired/Other
Other
Other/Impaired
Normal/Other
Other/Impaired
Other
Impaired/Other
Other/Impaired
Impaired/Other
Normal/Other
Impaired
Impaired/Other
Normal/Other
Other/Impaired
Other/Impaired
Other/Normal
Other/Normal
Impaired
Impaired/Other
Normal/Other
Other/Impaired
Other/Normal
Impaired/Other
Other
Other/Impaired
Other
Other/Impaired
Normal/Other
Other
Impaired/Other
Other
Impaired
Other/Normal
Other/Impaired
Impaired/Other
Other/Normal
Impaired/Other
Impaired/Other
Other
Other/Normal
Other/Normal
Impaired/Other
Other/Impaired
Other/Impaired
Normal/Other
Other
Other
Impaired/Other
Impaired/Other
Other/Impaired
Other

## 2019-06-20 NOTE — PROGRESS NOTE BEHAVIORAL HEALTH - EYE CONTACT
Fair
Fair
Poor
Fair
Fair
Poor
Fair
Poor
Fair
Fair
Poor
Fair
Poor
Poor
Fair
Fair
Poor
Fair
Poor
Fair
Poor
Fair
Fair
Poor
Other
Fair
Poor
Fair
Fair
Fair/Other/Poor
Fair
Poor
Fair
Poor
Fair

## 2019-06-20 NOTE — PROGRESS NOTE BEHAVIORAL HEALTH - DETAILS
less restless now on Clonazepam for akathisia, occasional  bilateral hand tremors, improved on cogentin
slightly restless now on Clonazepam for akathisia, bilateral hand tremors, Cogentin changed to bid
less restless now on Clonazepam for akathisia, occasional  bilateral hand tremors, improved on cogentin
ALLERGIC:   GABI YBARRA
ALLERGIC:   GABI YBARRA
less restless now on Clonazepam for akathisia, occasional  bilateral hand tremors, improved on cogentin
slightly restless now on Clonazepam for akathisia, bilateral hand tremors, Cogentin changed to bid
ALLERGIC:   GABI YBARRA
less restless now on Clonazepam for akathisia, occasional  bilateral hand tremors, improved on cogentin
slightly restless now on Clonazepam for akathisia
less restless now on Clonazepam for akathisia, occasional  bilateral hand tremors, improved on cogentin

## 2019-06-20 NOTE — PROGRESS NOTE BEHAVIORAL HEALTH - ESTIMATED INTELLIGENCE
Below Average
Below Average
Average
Average
Below Average
Below Average
Average
Average
Below Average
Average
Below Average
Average
Below Average
Below Average
Average
Average
Below Average
Average
Below Average
Average
Below Average
Average
Below Average
Average
Other
Below Average
Other
Average
Below Average
Average
Other
Average
Average
Other

## 2019-06-20 NOTE — PROGRESS NOTE BEHAVIORAL HEALTH - NSBHADMITNEWMED_PSY_A_CORE
no
yes
no
yes
no
yes
no
yes
yes
no
yes

## 2019-06-20 NOTE — PROGRESS NOTE BEHAVIORAL HEALTH - NS ED [facility] REVIEW OF ED CHART AVAILABLE LABS REVIEWED
Yes
None available
Yes
None available
Yes
Yes
None available
Yes

## 2019-06-20 NOTE — PROGRESS NOTE BEHAVIORAL HEALTH - PRN MEDS
Trazadone
IM Ativan and Haldol due to verbal agitation and threatening staff   Cooperative with injection
, Ativan
Haldol and Ativan po and IM
Haldol and Ativan
Haldol and Ativan
Haldol and Ativan for verbal agitation
IM Ativan and Haldol due to verbal agitation and threatening staff   Cooperative with injection
had prn haldol and ativan on 5/22
Haldol and Ativan
Haldol and Ativan po
Haldol and Ativan po and IM   prn Benadryl for EPS
Trazadone
Ativan
Ativan  and  Haldol
Haldol and Ativan
IM Ativan and Haldol due to verbal agitation and threatening staff   Cooperative with injection
Trazadone
Trazadone
Trazadone for sleep
stat Haldol and Ativan IM due to combative behavior
Trazadone for sleep, Ativan and Haldol for agitated behavior
Trazadone   Haldol  Ativan
Haldol and Ativan
haldol, ativan, trazodone
Haldol and Ativan IM for verbal agitation and threatening staff
haldol and ativan
Haldol and Ativan
Trazadone, Atropine gtts orally for drooling
had Haldol and Ativan this morning
Haldol and Ativan po and IM   prn Benadryl for EPS
, Ativan
Ativan and Haldol
Ativan and Haldol
Haldol and Ativan
Haldol and Ativan po
Trazadone for sleep
Trazadone for sleep, Ativan and Haldol for agitated behavior

## 2019-06-20 NOTE — PROGRESS NOTE BEHAVIORAL HEALTH - NSBHFUPMEDSE_PSY_A_CORE
Yes
None known
Yes
Yes
None known
None known
Yes
None known
Yes
None known
Yes
None known
Yes
None known
None known
Yes
None known
None known
Yes
Yes
None known
Yes
None known
None known
Yes

## 2019-06-20 NOTE — PROGRESS NOTE BEHAVIORAL HEALTH - NSBHADMITIPBHPROVIDER_PSY_A_CORE
yes/outpatient provider contacted by MAURO
outpatient provider contacted by SW/yes
no
yes/outpatient provider contacted by MAURO
outpatient provider contacted by SW/yes
yes/outpatient provider contacted by MAURO
outpatient provider contacted by SW/yes
yes/outpatient provider contacted by MAURO
Dr. Moreira spoke to FACT team/N/A
N/A
yes/outpatient provider contacted by MAURO
outpatient provider contacted by SW/yes
N/A/Dr. Moreira spoke to FACT team
outpatient provider contacted by SW/yes
yes/outpatient provider contacted by MAURO
yes/outpatient provider contacted by MAURO
outpatient provider contacted by SW/yes
yes
no
outpatient provider contacted by SW/yes
yes/outpatient provider contacted by MAURO
outpatient provider contacted by SW/yes
outpatient provider contacted by SW/yes
yes/outpatient provider contacted by MAURO
yes/outpatient provider contacted by MAURO
outpatient provider contacted by SW/yes
yes/outpatient provider contacted by MAURO
yes/outpatient provider contacted by MAURO
no
outpatient provider contacted by SW/yes
outpatient provider contacted by SW/yes
yes/outpatient provider contacted by MAURO
yes/outpatient provider contacted by MAURO
[facility] staff provided info, SW contacted outpatient provider/yes
outpatient provider contacted by SW/yes
yes/outpatient provider contacted by MAURO
Dr. Moreira spoke to FACT team/N/A
N/A/Dr. Moreira spoke to FACT team
outpatient provider contacted by SW/yes
yes/[facility] staff provided info, SW contacted outpatient provider
yes/outpatient provider contacted by MAURO
outpatient provider contacted by SW/yes
yes/outpatient provider contacted by MAURO
Dr. Moreira spoke to FACT team/N/A
N/A
[facility] staff provided info, SW contacted outpatient provider/yes
outpatient provider contacted by SW/yes
outpatient provider contacted by SW/yes
yes

## 2019-06-20 NOTE — PROGRESS NOTE BEHAVIORAL HEALTH - ATTENTION / CONCENTRATION
Impaired
Other
Impaired

## 2019-06-20 NOTE — PROGRESS NOTE BEHAVIORAL HEALTH - RELATEDNESS
Poor
Fair
Poor
Fair
Poor
Fair
Other
Poor
Fair
Poor
Other
Poor
Other
Poor
Fair
Fair
Poor

## 2019-06-20 NOTE — PROGRESS NOTE BEHAVIORAL HEALTH - RISK ASSESSMENT
chronically elevated given schizophrenia and poor impulse control. currently denies SI/HI
Denies SI or HI.  risk factors: psychotic sx, episodes of irritability   Protective factors: IDs reasons to live, future oriented, supportive living situation, +therapeutic relationships, compliant with tx
Patient is at risk of harm to self/others.  Unable to state whether he has thoughts of harm to self or others.   Very disorganized
chronically elevated given schizophrenia and poor impulse control. currently denies SI/HI
Denies SI or HI.  risk factors: psychotic sx, episodes of irritability   Protective factors: IDs reasons to live, future oriented, supportive living situation, +therapeutic relationships, compliant with tx
Denies SI or HI.  risk factors: psychotic sx, episodes of irritability   Protective factors: IDs reasons to live, future oriented, supportive living situation, +therapeutic relationships, compliant with tx
chronically elevated given schizophrenia and poor impulse control. currently denies SI/HI
Patient is at risk of harm to self/others.  is irritable and angry at times.  Denies any SI or HI
Patient is at risk of harm to self/others.  Unable to state whether he has thoughts of harm to self or others.   Very disorganized
chronically elevated given schizophrenia and poor impulse control. currently denies SI/HI
Denies SI or HI.  risk factors: psychotic sx, episodes of irritability and combativeness  Protective factors: IDs reasons to live, future oriented, supportive living situation, +therapeutic relationships, compliant with tx
Patient is at risk of harm to self/others.  Unable to state whether he has thoughts of harm to self or others.   Very disorganized

## 2019-06-20 NOTE — PROGRESS NOTE BEHAVIORAL HEALTH - PERCEPTIONS
No abnormalities
No abnormalities
No abnormalities/Other
Other
No abnormalities
No abnormalities
No abnormalities/Other
No abnormalities
Auditory hallucinations
No abnormalities
Other/No abnormalities
No abnormalities
No abnormalities/Other
Other/No abnormalities
No abnormalities
No abnormalities
Other/No abnormalities
No abnormalities
No abnormalities/Other
Other/No abnormalities
No abnormalities/Other
No abnormalities
Other/No abnormalities
No abnormalities
No abnormalities/Other
Other/No abnormalities
Other
Other/No abnormalities
No abnormalities
No abnormalities/Other
No abnormalities
Other/No abnormalities
No abnormalities
Other/No abnormalities
Other
Other/No abnormalities
Other
Other/No abnormalities

## 2019-06-20 NOTE — PROGRESS NOTE BEHAVIORAL HEALTH - NSBHCHARTREVIEWLAB_PSY_A_CORE FT
CtrB7E=5.7
no new labs.
HlaE8K=9.5
NurG0R=9.7
Na of 5/10=136  for BMP on 5/14
Na of 5/1413=109   glucose of 5/1682=287   daily fingerstick ordered
KhxJ7L=1.7
AhmN7J=4.7
Na of 5/1477=787   glucose of 5/1602=628   daily fingerstick ordered
OtrE8A=3.7
glucose of 5/2378=580
15.7   13.7  )-----------( 263      ( 05 Apr 2019 00:57 )             46.1   04-05    134<L>  |  100  |  15.0  ----------------------------<  128<H>  4.6   |  22.0  |  0.46<L>    Ca    9.3      05 Apr 2019 00:57  Mg     1.8     04-05    TPro  6.4<L>  /  Alb  3.9  /  TBili  0.4  /  DBili  x   /  AST  30  /  ALT  35  /  AlkPhos  82  04-05
AiuQ9I=6.7
FhpZ3C=1.7
FovE6H=9.7
IqcS1G=6.7
MyvW6Y=8.7
Na of 5/8=134  for BMP on 5/9
Na of 5/9=134  for BMP on 5/10
NzyY5L=0.7
PayS1K=1.7
PluK4R=1.7
ThmT7J=4.7
XuuJ6Z=2.7
15.7   13.7  )-----------( 263      ( 05 Apr 2019 00:57 )             46.1   04-05    134<L>  |  100  |  15.0  ----------------------------<  128<H>  4.6   |  22.0  |  0.46<L>    Ca    9.3      05 Apr 2019 00:57  Mg     1.8     04-05    TPro  6.4<L>  /  Alb  3.9  /  TBili  0.4  /  DBili  x   /  AST  30  /  ALT  35  /  AlkPhos  82  04-05
15.7   13.7  )-----------( 263      ( 05 Apr 2019 00:57 )             46.1   04-05    134<L>  |  100  |  15.0  ----------------------------<  128<H>  4.6   |  22.0  |  0.46<L>    Ca    9.3      05 Apr 2019 00:57  Mg     1.8     04-05    TPro  6.4<L>  /  Alb  3.9  /  TBili  0.4  /  DBili  x   /  AST  30  /  ALT  35  /  AlkPhos  82  04-05
CcjK4M=4.7
GhlC4C=7.7
Na of 5/1422=088   glucose of 5/1666=207
Na of 5/1437=639   glucose of 5/1699=661   daily fingerstick ordered
Na of 5/9=134  for BMP on 5/10
Na of 5/9=134  for BMP on 5/10
WdwS3Q=5.5
glucose of 5/2308=869
glucose of 5/2393=172
Na of 5/1495=354   glucose of 5/1608=598
NnaS8H=3.5
BhnX0Q=1.5
no new labs.
CecD5Z=0.7
Na of 5/1408=900   glucose of 5/1617=913   daily fingerstick ordered
Na of 5/10=136
15.7   13.7  )-----------( 263      ( 05 Apr 2019 00:57 )             46.1   04-05    134<L>  |  100  |  15.0  ----------------------------<  128<H>  4.6   |  22.0  |  0.46<L>    Ca    9.3      05 Apr 2019 00:57  Mg     1.8     04-05    TPro  6.4<L>  /  Alb  3.9  /  TBili  0.4  /  DBili  x   /  AST  30  /  ALT  35  /  AlkPhos  82  04-05
BevR0E=1.7
KtyY2E=8.7
HgbA1C  Lipid Panel   RPR  ordered
Na of 5/10=136  for BMP on 5/14
YdnE0W=2.5
ThkA4A=0.5
no new labs.
Na of 5/1477=375   glucose of 5/1655=603
XdnK1O=8.7
PhlW8X=7.7
ZxvA4A=3.7
KswL3O=1.5
Na of 5/1469=208   glucose=186   repeat glucose ordered
Na of 5/1456=646   glucose of 5/1646=207
Na of 5/9=134  for BMP on 5/10
TryE1R=0.5

## 2019-06-20 NOTE — PROGRESS NOTE BEHAVIORAL HEALTH - ORIENTED TO PERSON
Yes
Yes
Other
Other
Yes
Yes
Other
Yes
Other
Yes
Other
Yes
Other
Yes
Other
Yes
Other
Yes
Other
Yes
Other
Yes
Other
Yes
Other
Other
Yes

## 2019-06-20 NOTE — PROGRESS NOTE BEHAVIORAL HEALTH - NSBHADMITCOORDCARE_PSY_A_CORE
yes

## 2019-06-20 NOTE — PROGRESS NOTE BEHAVIORAL HEALTH - NSBHADMITIPOBSFT_PSY_A_CORE
He will continue on constant observation due to reported increased irritability, anger,  and verbal aggression. He  needs redirection because of disorganized behavior.
has increased verbal agitation  Now on Enhanced Supervision due to psychotic sx
poor impulse control, may become disruptive on the unit easily
has increased verbal agitation  Now on Enhanced Supervision due to psychotic sx
has increased verbal agitation  Now on Enhanced Supervision due to psychotic sx
He will continue on constant observation due to reported increased irritability, anger,  and verbal aggression. He  needs redirection because of disorganized behavior.
poor impulse control, may become disruptive on the unit easily
He will continue on constant observation while awake.. He  needs redirection because of disorganized behavior.  Enhanced Supervision at night to provide needed support
He will continue on constant observation due to reported increased irritability, anger,  and verbal aggression. He  needs redirection because of disorganized behavior.
has increased verbal agitation  Now on Enhanced Supervision due to psychotic sx
has episodes of verbal agitation  Now on Enhanced Supervision due to psychotic sx
has increased verbal agitation  Now on Enhanced Supervision due to psychotic sx
has increased verbal agitation  Now on Enhanced Supervision due to psychotic sx
He will continue on constant observation due to reported increased irritability, anger,  and verbal aggression. He  needs redirection because of disorganized behavior.
He will continue on constant observation due to reported increased irritability, anger,  and verbal aggression. He  needs redirection because of disorganized behavior.
1:1 for disorganization
1:1 for disorganization, hx of agitation
He will continue on constant observation while awake.. He  needs redirection because of disorganized behavior.
has increased verbal agitation  Now on Enhanced Supervision due to psychotic sx
exhibited combative, agitated behavior, and constant obs restarted for safety of patient and others.
He will continue on constant observation while awake.. He  needs redirection because of disorganized behavior.  Enhanced Supervision at night to provide needed support
has increased verbal agitation  Now on Enhanced Supervision due to psychotic sx
He will continue on constant observation due to reported increased irritability, anger,  and verbal aggression. He  needs redirection because of disorganized behavior.
He will continue on constant observation due to reported increased irritability, and verbal aggression. He  needs redirection because of disorganized behavior.
poor impulse control, may become disruptive on the unit easily
He will continue on constant observation due to reported increased irritability, anger,  and verbal aggression. He  needs redirection because of disorganized behavior.
He will continue on constant observation while awake.. He  needs redirection because of disorganized behavior.  Enhanced Supervision at night to provide needed support
He will continue on constant observation due to reported increased irritability, anger,  and verbal aggression. He  needs redirection because of disorganized behavior.
He will continue on constant observation while awake.. He  needs redirection because of disorganized behavior.
He will continue on constant observation while awake.. He  needs redirection because of disorganized behavior.  Enhanced Supervision at night to provide needed support
He will continue on constant observation while awake.. He  needs redirection because of disorganized behavior.  Enhanced Supervision at night to provide needed support
has increased verbal agitation  Now on Enhanced Supervision due to psychotic sx
Behavioral control improved.  Now on Enhanced Supervision due to psychotic sx
He will continue on constant observation due to reported increased irritability, anger,  and verbal aggression. He  needs redirection because of disorganized behavior.
He will continue on constant observation while awake.. He  needs redirection because of disorganized behavior.
has increased verbal agitation  Now on Enhanced Supervision due to psychotic sx
Behavioral control improved, but has increased verbal agitation  Now on Enhanced Supervision due to psychotic sx
Behavioral control improved.  Now on Enhanced Supervision due to psychotic sx
Behavioral control improved.  Now on Enhanced Supervision due to psychotic sx
He will continue on constant observation due to reported increased irritability, anger,  and verbal aggression. He  needs redirection because of disorganized behavior.
He will continue on constant observation due to reported increased irritability, anger,  and verbal aggression. He  needs redirection because of disorganized behavior.
has increased verbal agitation  Now on Enhanced Supervision due to psychotic sx
He will continue on constant observation due to reported increased irritability, anger,  and verbal aggression. He  needs redirection because of disorganized behavior.
has episodes of verbal agitation, but is generally in behavioral control
poor impulse control, may become disruptive on the unit easily
He will continue on constant observation WHILE AWAKE ONLY, but not while he is sleeping since he has not been an acute risk to himself or others but needs redirection because of disorganized behavior.
He will continue on constant observation due to reported increased irritability, anger,  and verbal aggression. He  needs redirection because of disorganized behavior.
He will continue on constant observation while awake.. He  needs redirection because of disorganized behavior.  Enhanced Supervision at night to provide needed support
patient has hx of aggressive behavior, and is psychotic
patient has hx of aggressive behavior, and is psychotic
He will continue on constant observation WHILE AWAKE ONLY, but not while he is sleeping since he has not been an acute risk to himself or others but needs redirection because of disorganized behavior.
He will continue on constant observation due to reported increased irritability, anger,  and verbal aggression. He  needs redirection because of disorganized behavior.
in behavioral control, denies any SI or HI, as patient was just taken off 1:1, to have enhanced supervision to monitor and provide support
in behavioral control, denies any SI or HI, as patient was just taken off 1:1, to have enhanced supervision to monitor and provide support
He will continue on constant observation due to reported increased irritability, anger,  and verbal aggression. He  needs redirection because of disorganized behavior.
has episodes of verbal agitation  Now on Enhanced Supervision due to psychotic sx
1:1 for disorganization, hx of agitation
1:1 for disorganization
He will continue on constant observation WHILE AWAKE ONLY, but not while he is sleeping since he has not been an acute risk to himself or others but needs redirection because of disorganized behavior.
He will continue on constant observation due to reported increased irritability, anger,  and verbal aggression. He  needs redirection because of disorganized behavior.
has increased verbal agitation  Now on Enhanced Supervision due to psychotic sx
Patient has recent hx of agitation, is psychotic and is at risk of falling due to unsteady gait.

## 2019-06-20 NOTE — PROGRESS NOTE BEHAVIORAL HEALTH - RECENT MEMORY
Impaired
Impaired
Normal
Impaired
Other
Impaired
Normal
Normal
Impaired
Normal
Impaired
Normal
Normal
Impaired
Normal
Normal
Impaired
Normal
Impaired
Normal
Impaired
Normal
Impaired
Normal
Impaired
Normal
Impaired
Normal
Normal
Impaired

## 2019-06-20 NOTE — PROGRESS NOTE BEHAVIORAL HEALTH - OTHER
" [employer] is in my home" older than stated age irritable verbal behavior at times, cooperative at other times tenuous, impaired at times bilateral hand tremors poor dentition, hyperverbal at times

## 2019-06-20 NOTE — PROGRESS NOTE BEHAVIORAL HEALTH - [id number]_A_CORE
02-May-2019 11:28
03-Jun-2019 15:30
03-May-2019 14:48
05-Apr-2019 15:08
06-Apr-2019 12:35
07-Apr-2019 12:50
07-Jun-2019 14:55
08-Apr-2019 15:38
08-May-2019 12:29
09-Apr-2019 15:55
09-May-2019 11:52
10-Apr-2019 13:38
11-Apr-2019
11-May-2019 11:02
12-Apr-2019 12:34
12-May-2019 10:35
13-Apr-2019 14:09
14-Apr-2019 20:28
14-Jun-2019 11:46
15-Apr-2019 17:28
16-May-2019 14:40
18-Apr-2019 17:02
19-Apr-2019 15:56
19-Jun-2019 11:08
20-Apr-2019 11:43
20-Jun-2019 06:59
21-Apr-2019 13:52
21-May-2019 16:54
23-Apr-2019 14:16
24-Apr-2019 12:50
24-May-2019 16:17
26-Apr-2019 13:01
27-Apr-2019 19:47
28-Apr-2019 11:23
28-May-2019 15:42
30-Apr-2019 15:51
30-May-2019 17:28
18-May-2019 11:18
17-Jun-2019 16:05
15-May-2019 16:39
13-Jun-2019 11:58
06-May-2019 11:43
04-Jun-2019 17:52
16-Apr-2019 13:47
23-May-2019 16:10
29-May-2019 14:47
22-May-2019 15:37
31-May-2019 18:28
02-Jun-2019 18:26
25-Apr-2019 14:38
04-May-2019
13-May-2019 16:28
10-Frank-2019 16:36
04-May-2019
19-May-2019 11:28
06-Jun-2019 16:14
17-Apr-2019 15:46
07-May-2019 16:53
01-May-2019 15:09
11-Jun-2019 14:34
29-Apr-2019 14:57
17-May-2019 14:27
10-May-2019
10-May-2019 18:33
12-Jun-2019 15:51
05-Jun-2019 16:01
17-Jun-2019 16:05
20-May-2019 13:55
13-May-2019 16:28
22-Apr-2019 16:16

## 2019-06-20 NOTE — PROGRESS NOTE BEHAVIORAL HEALTH - BODY HABITUS
Well nourished/Average build
Well nourished/Average build
Average build/Well nourished
Well nourished/Average build
Well nourished/Average build
Average build/Well nourished
Well nourished/Average build
Average build/Well nourished
Well nourished/Average build
Average build/Well nourished
Average build/Well nourished
Well nourished/Average build
Average build/Well nourished
Well nourished
Well nourished
Well nourished/Average build
Average build/Well nourished
Well nourished/Average build
Well nourished/Average build
Average build/Well nourished
Well nourished/Average build
Average build/Well nourished
Well nourished/Average build
Average build/Well nourished
Well nourished/Average build
Average build/Well nourished
Average build/Well nourished
Well nourished
Well nourished/Average build
Well nourished/Average build
Average build/Well nourished
Well nourished/Average build
Average build/Well nourished
Well nourished/Average build

## 2019-06-20 NOTE — PROGRESS NOTE BEHAVIORAL HEALTH - AFFECT QUALITY
Other/Irritable
Other
Anxious
Irritable/Anxious
Other/Irritable
Irritable/Other
Irritable
Irritable
Anxious
Irritable/Other
Other/Irritable
Anxious
Euthymic
Other/Irritable
Other/Irritable
Anxious
Irritable/Anxious
Irritable/Other
Anxious
Irritable/Anxious
Other/Irritable
Anxious
Other/Irritable
Irritable
Anxious
Irritable
Anxious
Other/Irritable
Irritable/Other
Irritable
Other/Irritable
Anxious/Irritable
Euthymic
Irritable
Irritable
Irritable/Other
Other/Anxious/Irritable
Other/Irritable
Anxious
Irritable
Irritable/Other
Other/Irritable
Anxious/Depressed
Other
Irritable
Irritable/Anxious
Other/Irritable
Other/Irritable
Irritable
Other/Irritable
Irritable/Other
Depressed/Anxious
Irritable
Other/Irritable
Irritable/Other
Depressed/Anxious
Other

## 2019-06-20 NOTE — PROGRESS NOTE BEHAVIORAL HEALTH - NS ED [facility] MED ROS NEUROLOGICAL
No complaints
Unable to assess
Unable to assess
No complaints
Unable to assess
No complaints

## 2019-06-20 NOTE — PROGRESS NOTE BEHAVIORAL HEALTH - LANGUAGE
No abnormalities noted
Impaired naming
Impaired naming
No abnormalities noted
Other
Other
No abnormalities noted
Impaired naming
Impaired naming
No abnormalities noted
Impaired naming
Impaired naming
No abnormalities noted
Impaired naming
Impaired naming
No abnormalities noted
No abnormalities noted
Impaired naming
No abnormalities noted
Impaired naming
No abnormalities noted
Impaired naming
No abnormalities noted
Impaired naming
No abnormalities noted
No abnormalities noted
Other
No abnormalities noted
No abnormalities noted
Impaired naming
No abnormalities noted
No abnormalities noted
Impaired naming
No abnormalities noted
Impaired naming
No abnormalities noted
Impaired naming
Impaired naming
Impaired naming/Impaired repetition
Impaired repetition/Impaired naming
No abnormalities noted
Impaired naming
No abnormalities noted
Impaired naming/Impaired repetition
No abnormalities noted
No abnormalities noted
Impaired naming/Impaired repetition

## 2019-06-20 NOTE — PROGRESS NOTE BEHAVIORAL HEALTH - NS ED [facility] MED ROS PSYCHIATRIC
See HPI

## 2019-06-20 NOTE — PROGRESS NOTE BEHAVIORAL HEALTH - FUND OF KNOWLEDGE
Impaired
Impaired
Normal
Impaired
Impaired
Normal
Other
Normal
Impaired
Normal
Impaired
Normal
Normal
Impaired
Normal
Normal
Impaired
Normal
Impaired
Normal
Impaired
Normal
Impaired
Other
Impaired
Normal
Impaired
Normal
Other
Normal
Normal
Other

## 2019-06-20 NOTE — PROGRESS NOTE BEHAVIORAL HEALTH - NS ED [facility] REVIEW OF ED CHART AVAILABLE INVESTIGATIONS REVIEWED
Yes
None available
None available
Yes
None available
Yes

## 2019-06-20 NOTE — PROGRESS NOTE BEHAVIORAL HEALTH - PRIMARY DX
Residual schizophrenia
Schizophrenia, unspecified type
Residual schizophrenia
Schizophrenia, unspecified type
Residual schizophrenia
Schizophrenia, unspecified type
Schizophrenia, unspecified type
Residual schizophrenia
Residual schizophrenia
Schizophrenia, unspecified type
Schizophrenia, unspecified type
Residual schizophrenia
Schizophrenia, unspecified type
Schizophrenia, unspecified type

## 2019-06-20 NOTE — PROGRESS NOTE BEHAVIORAL HEALTH - MOOD
Angry
Anxious
Irritable/Anxious
Irritable/Angry
Irritable
Irritable
Anxious
Angry
Irritable/Angry
Anxious
Normal/Other
Angry/Irritable
Normal
Other
Other/Anxious/Normal
Anxious/Other
Normal
Anxious
Angry
Normal/Other
Normal
Irritable
Other/Normal
Irritable
Anxious
Irritable/Angry
Irritable
Angry
Normal
Angry
Irritable
Normal/Other
Other
Angry/Irritable
Anxious
Anxious
Anxious/Irritable
Irritable
Angry
Angry/Irritable
Angry/Irritable
Anxious
Anxious/Irritable
Irritable
Irritable/Angry
Irritable/Angry
Other
Other/Normal
Irritable
Normal/Other
Other
Angry
Angry
Irritable
Angry
Other
Other
Irritable/Angry
Angry/Irritable
Other
Angry/Irritable
Irritable/Angry
Other
Other
Normal/Other

## 2019-06-20 NOTE — PROGRESS NOTE BEHAVIORAL HEALTH - SUMMARY
Patient is a 64 y/o M with reported hx of schizophrenia sent in from [facility] residence for report of agitation.  Was agitated in [place] ED, and transferred to [facility] for inpatient psychiatric admission for safety and tx. Was initially started on Seroquel, changed to Haldol, then to Risperdal. More verbal on 4/8, increased Risperdal to 1mg bid.  on 4/9, increased Risperdal to 1qam, 2mg hs.  Improved sx. Began Invega Sustenna on 4/11. Combative behavior on 4/12, stat Haldol and Ativan IM with good results. Patient continues on constant observation for safety due to angry, irritable mood and psychotic sx. He continues to be irritable at times and needed prn Haldol and Ativan. PT does appear restless and may benefit from a small dose of Clonazepam for possible akathisia. Plan: Continue one to one for safely  Started on Clonazepam 0.25mg po daily  for restlessness secondary to antipsychotics due to poor sleep, Klonopin increased to 1mg at hs.  Paperwork begun to transfer patient to [facility]. Invega Sustenna 156mg given 4/16. Trazodone 50mg po q 6pm and may have another prn of 50mg at qhs if needed.   Risperidone 2mg BID   Haldol 5mg/Ativan 2mg prns    Invega Sustenna started 5/15, excessive drooling    Atropine gtts SL ordered.    Cogentin started as standing due to hand tremors with improved sx noted.  Pt cannot return to [facility].  will be applying for [facility]  Invega Sustena Inj due 6/11
Patient is a 64 y/o M with reported hx of schizophrenia sent in from [facility] for report of agitation.  Was agitated in [place] ED, and transferred to [facility] for inpatient psychiatric admission for safety and tx. Was initially started on Seroquel, changed to Haldol, then to Risperdal. More verbal on 4/8, increased Risperdal to 1mg bid.  on 4/9, increased Risperdal to 1qam, 2mg hs.  Improved sx. Began Invega Sustenna on 4/11. Combative behavior on 4/12, stat Haldol and Ativan IM with good results. Next dose of Invega Sustenna  due 4/16. Calm since the incident cooperative with writer on 4/13, denies SI/HI. Fair behavioral control. Provided nicotine inhaler for cravings. c/w current psych regimen.
Patient is a 62 y/o M with reported hx of schizophrenia sent in from [facility] residence for report of agitation.  Was agitated in [place] ED, and transferred to [facility] for inpatient psychiatric admission for safety and tx. Was initially started on Seroquel, changed to Haldol, then to Risperdal. More verbal on 4/8, increased Risperdal to 1mg bid.  on 4/9, increased Risperdal to 1qam, 2mg hs.  Improved sx. Began Invega Sustenna on 4/11. Combative behavior on 4/12, stat Haldol and Ativan IM with good results. Patient continues on constant observation for safety due to angry, irritable mood and psychotic sx. He continues to be irritable at times and needed prn Haldol and Ativan. PT does appear restless and may benefit from a small dose of Clonazepam for possible akathisia. Plan: Continue one to one for safely  Started on Clonazepam 0.25mg po daily  for restlessness secondary to antipsychotics due to poor sleep, Klonopin increased to 1mg at hs.  [facility]work begun to [facility] to [facility]. Invega Sustenna 156mg given 4/16. Trazodone 50mg po q 6pm and may have another prn of 50mg at qhs if needed.   Risperidone 2mg BID   Haldol 5mg/Ativan 2mg prns    Invega Sustenna started 5/15, excessive drooling    Atropine gtts SL ordered.    Cogentin started as standing due to hand tremors with improved sx noted.  Pt cannot return to [facility].  will be applying for [facility]
Patient is a 64 y/o M with reported hx of schizophrenia sent in from [facility] residence for report of agitation.  Was agitated in [place] ED, and transferred to [facility] for inpatient psychiatric admission for safety and tx. Was initially started on Seroquel, changed to Haldol, then to Risperdal. More verbal on 4/8, increased Risperdal to 1mg bid.  on 4/9, increased Risperdal to 1qam, 2mg hs.  Improved sx. Began Invega Sustenna on 4/11. Combative behavior on 4/12, stat Haldol and Ativan IM with good results. Patient continues on constant observation for safety due to angry, irritable mood and psychotic sx. He continues to be irritable at times and needed prn Haldol and Ativan. PT does appear restless and may benefit from a small dose of Clonazepam for possible akathisia. Plan: Continue one to one for safely  Started on Clonazepam 0.25mg po daily  for restlessness secondary to antipsychotics due to poor sleep, Klonopin increased to 1mg at hs.  Paperwork begun to transfer [facility] to [facility]. Invega Sustenna 156mg given 4/16. Trazodone 50mg po q 6pm and may have another prn of 50mg at qhs if needed.   Risperidone 2mg BID   Haldol 5mg/Ativan 2mg prns    Invega Sustenna started 5/15, excessive drooling    Atropine gtts SL ordered.    Cogentin started as standing due to hand tremors with improved sx noted.  Pt cannot return to [facility].  will be applying for [facility]
Patient is a 62 y/o M with reported hx of schizophrenia sent in from [facility] for report of agitation.  Was agitated in [place] ED, and transferred to [facility] for inpatient psychiatric admission for safety and tx. Was initially started on Seroquel, changed to Haldol, then to Risperdal. More verbal on 4/8, increased Risperdal to 1mg bid.  on 4/9, increased Risperdal to 1qam, 2mg hs.  Improved sx. Began Invega Sustenna on 4/11. Combative behavior on 4/12, stat Haldol and Ativan IM with good results. Patient continues on constant observation for safety due to angry, irritable mood and psychotic sx. He continues to be irritable at times and needed prn Haldol and Ativan. PT does appear restless and may benefit from a small dose of Clonazepam for possible akathisia. Plan: Continue one to one for safely  Started on Clonazepam 0.25mg po daily  for restlessness secondary to antipsychotics due to poor sleep, Klonopin increased to 1mg at hs.  Paperwork begun to transfer patient to [facility]. Invega Sustenna 156mg given 4/16. Trazodone 50mg po q 6pm and may have another prn of 50mg at qhs if needed.   Risperidone 2mg BID   Haldol 5mg/Ativan 2mg prns    Invega Sustenna started 5/15, excessive drooling    Atropine gtts SL ordered.    Cogentin started as standing due to hand tremors with improved sx noted.
Patient is a 62 y/o M with reported hx of schizophrenia sent in from [facility] for report of agitation.  Was agitated in [place] ED, and transferred to [facility] for inpatient psychiatric admission for safety and tx. Was initially started on Seroquel, changed to Haldol, then to Risperdal. More verbal on 4/8, increased Risperdal to 1mg bid.  on 4/9, increased Risperdal to 1qam, 2mg hs.  Improved sx. Began Invega Sustenna on 4/11. Combative behavior on 4/12, stat Haldol and Ativan IM with good results. Patient continues on constant observation for safety due to angry, irritable mood and psychotic sx. He continues to be irritable at times and needed prn Haldol and Ativan. PT does appear restless and may benefit from a small dose of Clonazepam for possible akathisia. Plan: Continue one to one for safely  Started on Clonazepam 0.25mg po daily  for restlessness secondary to antipsychotics due to poor sleep, Klonopin increased to 1mg at hs.  Paperwork begun to transfer patient to [facility]. Invega Sustenna 156mg given 4/16. Trazodone 50mg po q 6pm and may have another prn of 50mg at qhs if needed.   Risperidone 2mg BID   Haldol 5mg/Ativan 2mg prns    Invega Sustenna given 5/15, excessive drooling    Atropine gtts SL ordered.    Cogentin started as standing due to hand tremors with improved sx noted.
Patient is a 62 y/o M with reported hx of schizophrenia sent in from [facility] residence for report of agitation.  Was agitated in [place] ED, and transferred to [facility] for inpatient psychiatric admission for safety and tx. Was initially started on Seroquel, changed to Haldol, then to Risperdal. More verbal on 4/8, increased Risperdal to 1mg bid.  on 4/9, increased Risperdal to 1qam, 2mg hs.  Improved sx. Began Invega Sustenna on 4/11. Combative behavior on 4/12, stat Haldol and Ativan IM with good results. Patient continues on constant observation for safety due to angry, irritable mood and psychotic sx. He continues to be irritable at times and needed prn Haldol and Ativan. PT does appear restless and may benefit from a small dose of Clonazepam for possible akathisia. Plan: Continue one to one for safely  Started on Clonazepam 0.25mg po daily  for restlessness secondary to antipsychotics due to poor sleep, Klonopin increased to 1mg at hs.  Paperwork begun to transfer patient to [facility]. Invega Sustenna 156mg given 4/16. Trazodone 50mg po q 6pm and may have another prn of 50mg at qhs if needed.   Risperidone 2mg BID   Haldol 5mg/Ativan 2mg prns    Invega Sustenna started 5/15, excessive drooling    Atropine gtts SL ordered.    Cogentin started as standing due to hand tremors with improved sx noted.  Pt cannot return to [facility].  will be applying for [facility]  Invega Sustena Inj due 7/9.  Court retention for 6 mnths on 6/11
Patient is a 64 y/o M with reported hx of schizophrenia sent in from [facility] for report of agitation.  Was agitated in [place] ED, and transferred to [facility] for inpatient psychiatric admission for safety and tx. Was initially started on Seroquel, changed to Haldol, then to Risperdal. More verbal on 4/8, increased Risperdal to 1mg bid.  on 4/9, increased Risperdal to 1qam, 2mg hs.  Improved sx. Began Invega Sustenna on 4/11. Combative behavior on 4/12, stat Haldol and Ativan IM with good results. Patient continues on constant observation for safety due to angry, irritable mood and psychotic sx. He continues to be irritable at times and needed prn Haldol and Ativan. PT does appear restless and may benefit from a small dose of Clonazepam for possible akathisia.   Plan: Continue one to one for safely  Started on Clonazepam 0.25mg po daily  for restlessness secondary to antipsychotics due to poor sleep, Klonopin increased to 1mg at hs.  Paperwork begun to transfer patient to [facility]  Invega Sustenna 156mg given 4/16.  Trazodone 50mg po q 6pm and may have another prn of 50mg at qhs if needed.   Risperidone 2mg BID  Haldol 5mg/Ativan 2mg prns  Invega Sustenna due 5/15
Patient is a 62 y/o M with reported hx of schizophrenia sent in from [facility] for report of agitation.  Was agitated in [place] ED, and transferred to [facility] for inpatient psychiatric admission for safety and tx. Was initially started on Seroquel, changed to Haldol, then to Risperdal. More verbal on 4/8, increased Risperdal to 1mg bid.  on 4/9, increased Risperdal to 1qam, 2mg hs.  Improved sx. Began Invega Sustenna on 4/11. Combative behavior on 4/12, stat Haldol and Ativan IM with good results. Next dose of Invega Sustenna  due 4/16. Calm since the incident cooperative with writer on 4/13, denies SI/HI. Fair behavioral control. Provided nicotine inhaler for cravings. c/w current psych regimen.
Patient is a 64 y/o M with reported hx of schizophrenia sent in from [facility] for report of agitation.  Was agitated in [place] ED, and transferred to [facility] for inpatient psychiatric admission for safety and tx. Was initially started on Seroquel, changed to Haldol, then to Risperdal. More verbal on 4/8, increased Risperdal to 1mg bid.  on 4/9, increased Risperdal to 1qam, 2mg hs.  Improved sx. Began Invega Sustenna on 4/11. Combative behavior on 4/12, stat Haldol and Ativan IM with good results. Patient continues on constant observation for safety due to angry, irritable mood and psychotic sx. He continues to be irritable at times and needed prn Haldol and Ativan. PT does appear restless and may benefit from a small dose of Clonazepam for possible akathisia. Plan: Continue one to one for safely  Started on Clonazepam 0.25mg po daily  for restlessness secondary to antipsychotics due to poor sleep, Klonopin increased to 1mg at hs.  Paperwork begun to transfer patient to [facility]. Invega Sustenna 156mg given 4/16. Trazodone 50mg po q 6pm and may have another prn of 50mg at qhs if needed.   Risperidone 2mg BID Haldol 5mg/Ativan 2mg prns  Invega Sustenna given 5/15, excessive drooling  Atropine gtts SL ordered.  Cogentin started as standing due to hand tremors with improved sx noted.  Hearing on 5/10 to apply to [facility]
Patient is a 62 y/o M with reported hx of schizophrenia sent in from [facility] for report of agitation.  Was agitated in [place] ED, and transferred to [facility] for inpatient psychiatric admission for safety and tx. Was initially started on Seroquel, changed to Haldol, then to Risperdal. More verbal on 4/8, increased Risperdal to 1mg bid.  on 4/9, increased Risperdal to 1qam, 2mg hs.  Improved sx. Began Invega Sustenna on 4/11. Combative behavior on 4/12, stat Haldol and Ativan IM with good results. Patient continues on constant observation for safety due to angry, irritable mood and psychotic sx. He continues to be irritable at times and needed prn Haldol and Ativan. PT does appear restless and may benefit from a small dose of Clonazepam for possible akathisia. Plan: Continue one to one for safely  Started on Clonazepam 0.25mg po daily  for restlessness secondary to antipsychotics due to poor sleep, Klonopin increased to 1mg at hs.  Paperwork begun to transfer patient to [facility]. Invega Sustenna 156mg given 4/16. Trazodone 50mg po q 6pm and may have another prn of 50mg at qhs if needed.   Risperidone 2mg BID Haldol 5mg/Ativan 2mg prns  Invega Sustenna due 5/15, excessive drooling  Atropine gtts SL ordered
Patient is a 62 y/o M with reported hx of schizophrenia sent in from [facility] for report of agitation.  Was agitated in [place] ED, and transferred to [facility] for inpatient psychiatric admission for safety and tx. Was initially started on Seroquel, changed to Haldol, then to Risperdal. More verbal on 4/8, increased Risperdal to 1mg bid.  on 4/9, increased Risperdal to 1qam, 2mg hs.  Improved sx. Began Invega Sustenna on 4/11. Combative behavior on 4/12, stat Haldol and Ativan IM with good results. dose of Invega Sustenna given 4/16. Calm since the incident cooperative with writer on 4/13, denies SI/HI. Fair behavioral control. Provided nicotine inhaler for cravings. c/w current psych regimen.
Patient is a 64 y/o M with reported hx of schizophrenia sent in from [facility] residence for report of agitation.  Was agitated in [place] ED, and transferred to [facility] for inpatient psychiatric admission for safety and tx. Was initially started on Seroquel, changed to Haldol, then to Risperdal. More verbal on 4/8, increased Risperdal to 1mg bid.  on 4/9, increased Risperdal to 1qam, 2mg hs.  Improved sx. Began Invega Sustenna on 4/11. Combative behavior on 4/12, stat Haldol and Ativan IM with good results. Patient continues on constant observation for safety due to angry, irritable mood and psychotic sx. He continues to be irritable at times and needed prn Haldol and Ativan. PT does appear restless and may benefit from a small dose of Clonazepam for possible akathisia. Plan: Continue one to one for safely  Started on Clonazepam 0.25mg po daily  for restlessness secondary to antipsychotics due to poor sleep, Klonopin increased to 1mg at hs.  Paperwork begun to transfer patient to [facility]. Invega Sustenna 156mg given 4/16. Trazodone 50mg po q 6pm and may have another prn of 50mg at qhs if needed.   Risperidone 2mg BID   Haldol 5mg/Ativan 2mg prns    Invega Sustenna started 5/15, excessive drooling    Atropine gtts SL ordered.    Cogentin started as standing due to hand tremors with improved sx noted.  Pt cannot return to [facility].  will be applying for [facility]  Invega Sustena Inj due 7/9.  Court retention for 6 mnths on 6/11
Patient is a 62 y/o M with reported hx of schizophrenia sent in from [facility] residence for report of agitation.  Was agitated in [place] ED, and transferred to [facility] for inpatient psychiatric admission for safety and tx. Was initially started on Seroquel, changed to Haldol, then to Risperdal. More verbal on 4/8, increased Risperdal to 1mg bid.  on 4/9, increased Risperdal to 1qam, 2mg hs.  Improved sx. Began Invega Sustenna on 4/11. Combative behavior on 4/12, stat Haldol and Ativan IM with good results. Patient continues on constant observation for safety due to angry, irritable mood and psychotic sx. He continues to be irritable at times and needed prn Haldol and Ativan. PT does appear restless and may benefit from a small dose of Clonazepam for possible akathisia. Plan: Continue one to one for safely  Started on Clonazepam 0.25mg po daily  for restlessness secondary to antipsychotics due to poor sleep, Klonopin increased to 1mg at hs.  Paperwork begun to transfer patient to [facility]. Invega Sustenna 156mg given 4/16. Trazodone 50mg po q 6pm and may have another prn of 50mg at qhs if needed.   Risperidone 2mg BID   Haldol 5mg/Ativan 2mg prns    Invega Sustenna started 5/15, excessive drooling    Atropine gtts SL ordered.    Cogentin started as standing due to hand tremors with improved sx noted.  Pt cannot return to [facility].  will be applying for [facility]  Invega Sustena Inj due 7/9.  Court retention for 6 mnths on 6/11
Patient is a 62 y/o M with reported hx of schizophrenia sent in from [facility] residence for report of agitation.  Was agitated in [place] ED, and transferred to [facility] for inpatient psychiatric admission for safety and tx. Was initially started on Seroquel, changed to Haldol, then to Risperdal. More verbal on 4/8, increased Risperdal to 1mg bid.  on 4/9, increased Risperdal to 1qam, 2mg hs.  Improved sx. Began Invega Sustenna on 4/11. Combative behavior on 4/12, stat Haldol and Ativan IM with good results. Patient continues on constant observation for safety due to angry, irritable mood and psychotic sx. He continues to be irritable at times and needed prn Haldol and Ativan. PT does appear restless and may benefit from a small dose of Clonazepam for possible akathisia. Plan: Continue one to one for safely  Started on Clonazepam 0.25mg po daily  for restlessness secondary to antipsychotics due to poor sleep, Klonopin increased to 1mg at hs.  Paperwork begun to transfer patient to [facility]. Invega Sustenna 156mg given 4/16. Trazodone 50mg po q 6pm and may have another prn of 50mg at qhs if needed.   Risperidone 2mg BID   Haldol 5mg/Ativan 2mg prns    Invega Sustenna started 5/15, excessive drooling    Atropine gtts SL ordered.    Cogentin started as standing due to hand tremors with improved sx noted.  [facility] to [facility]  will be applying for [facility]  Invega Sustena Inj due 7/2.  Court retention for 6 mnths on 6/11..for transfer to [facility] on 6/20
Patient is a 64 y/o M with reported hx of schizophrenia sent in from [facility] residence for report of agitation.  Was agitated in [place] ED, and transferred to [facility] for inpatient psychiatric admission for safety and tx. Was initially started on Seroquel, changed to Haldol, then to Risperdal. More verbal on 4/8, increased Risperdal to 1mg bid.  on 4/9, increased Risperdal to 1qam, 2mg hs.  Improved sx. Began Invega Sustenna on 4/11. Combative behavior on 4/12, stat Haldol and Ativan IM with good results. Patient continues on constant observation for safety due to angry, irritable mood and psychotic sx. He continues to be irritable at times and needed prn Haldol and Ativan. PT does appear restless and may benefit from a small dose of Clonazepam for possible akathisia. Plan: Continue one to one for safely  Started on Clonazepam 0.25mg po daily  for restlessness secondary to antipsychotics due to poor sleep, Klonopin increased to 1mg at hs.  Paperwork begun to transfer patient to [facility]. Invega Sustenna 156mg given 4/16. Trazodone 50mg po q 6pm and may have another prn of 50mg at qhs if needed.   Risperidone 2mg BID   Haldol 5mg/Ativan 2mg prns    Invega Sustenna started 5/15, excessive drooling    Atropine gtts SL ordered.    Cogentin started as standing due to hand tremors with improved sx noted.  Pt cannot return to [facility].  will be applying for [facility]  Invega Sustena Inj due 6/11
Patient is a 62 y/o M with reported hx of schizophrenia sent in from [facility] for report of agitation.  Was agitated in [place] ED, and transferred to [facility] for inpatient psychiatric admission for safety and tx. Was initially started on Seroquel, changed to Haldol, then to Risperdal. More verbal on 4/8, increased Risperdal to 1mg bid.  on 4/9, increased Risperdal to 1qam, 2mg hs.  Improved sx. Began Invega Sustenna on 4/11. Combative behavior on 4/12, stat Haldol and Ativan IM with good results. Patient continues on constant observation for safety due to angry, irritable mood and psychotic sx. He continues to be irritable at times and needed prn Haldol and Ativan. PT does appear restless and may benefit from a small dose of Clonazepam for possible akathisia.   Plan: Continue one to one for safely  Started on Clonazepam 0.25mg po BID for restlessness secondary to antipsychotics due to poor sleep, Klonopin increased to 1mg at hs.  Paperwork begun to transfer patient to [facility]  Invega Sustenna 156mg given 4/16.  Risperidone 2mg BID  Haldol 5mg/Ativan 2mg prns
Patient is a 64 y/o M with reported hx of schizophrenia sent in from [facility] for report of agitation.  Was agitated in [place] ED, and transferred to [facility] for inpatient psychiatric admission for safety and tx. Was initially started on Seroquel, changed to Haldol, then to Risperdal. More verbal on 4/8, increased Risperdal to 1mg bid.  on 4/9, increased Risperdal to 1qam, 2mg hs.  Improved sx. Began Invega Sustenna on 4/11. Combative behavior on 4/12, stat Haldol and Ativan IM with good results. Patient continues on constant observation for safety due to angry, irritable mood and psychotic sx. He continues to be irritable at times and needed prn Haldol and Ativan. PT does appear restless and may benefit from a small dose of Clonazepam for possible akathisia. Plan: Continue one to one for safely  Started on Clonazepam 0.25mg po daily  for restlessness secondary to antipsychotics due to poor sleep, Klonopin increased to 1mg at hs.  Paperwork begun to transfer patient to [facility]. Invega Sustenna 156mg given 4/16. Trazodone 50mg po q 6pm and may have another prn of 50mg at qhs if needed.   Risperidone 2mg BID Haldol 5mg/Ativan 2mg prns  Invega Sustenna due 5/15, excessive drooling  Atropine gtts SL ordered.  Cogentin started as standing due to hand tremors with improved sx noted
Patient is a 62 y/o M with reported hx of schizophrenia sent in from [facility] residence for report of agitation.  Was agitated in [place] ED, and transferred to [facility] for inpatient psychiatric admission for safety and tx. Was initially started on Seroquel, changed to Haldol, then to Risperdal. More verbal on 4/8, increased Risperdal to 1mg bid.  on 4/9, increased Risperdal to 1qam, 2mg hs.  Improved sx. Began Invega Sustenna on 4/11. Combative behavior on 4/12, stat Haldol and Ativan IM with good results. Patient continues on constant observation for safety due to angry, irritable mood and psychotic sx. He continues to be irritable at times and needed prn Haldol and Ativan. PT does appear restless and may benefit from a small dose of Clonazepam for possible akathisia. Plan: Continue one to one for safely  Started on Clonazepam 0.25mg po daily  for restlessness secondary to antipsychotics due to poor sleep, Klonopin increased to 1mg at hs.  Paperwork begun to transfer patient to [facility]. Invega Sustenna 156mg given 4/16. Trazodone 50mg po q 6pm and may have another prn of 50mg at qhs if needed.   Risperidone 2mg BID   Haldol 5mg/Ativan 2mg prns    Invega Sustenna started 5/15, excessive drooling    Atropine gtts SL ordered.    Cogentin started as standing due to hand tremors with improved sx noted.  Pt cannot return to [facility].  will be applying for [facility]  Invega Sustena Inj due 6/11
Patient is a 64 y/o M with reported hx of schizophrenia sent in from [facility] for report of agitation.  Was agitated in [place] ED, and transferred to [facility] for inpatient psychiatric admission for safety and tx. Was initially started on Seroquel, changed to Haldol, then to Risperdal. More verbal on 4/8, increased Risperdal to 1mg bid.  on 4/9, increased Risperdal to 1qam, 2mg hs.  Improved sx. Began Invega Sustenna on 4/11. Combative behavior on 4/12, stat Haldol and Ativan IM with good results. Patient continues on constant observation for safety due to angry, irritable mood and psychotic sx. He continues to be irritable at times and needed prn Haldol and Ativan. PT does appear restless and may benefit from a small dose of Clonazepam for possible akathisia.   Plan: Continue one to one while awake for safely  Start Clonazepam 0.25mg po BID for restlessness secondary to antipsychotics.  Invega Sustenna 156mg given 4/16.  Risperidone 2mg BID  Haldol 5mg/Ativan 2mg prns
Patient is a 62 y/o M with reported hx of schizophrenia sent in from [facility] for report of agitation.  Was agitated in [place] ED, and transferred to [facility] for inpatient psychiatric admission for safety and tx.  Adjusting very slowly to unit.  Speech is difficult to understand, and patient does not give a lot of information
Patient is a 62 y/o M with reported hx of schizophrenia sent in from [facility] for report of agitation.  Was agitated in [place] ED, and transferred to [facility] for inpatient psychiatric admission for safety and tx. Was initially started on Seroquel, changed to Haldol, then to Risperdal. More verbal on 4/8, increased Risperdal to 1mg bid.  on 4/9, increased Risperdal to 1qam, 2mg hs.  Improved sx. Began Invega Sustenna on 4/11. Combative behavior on 4/12, stat Haldol and Ativan IM with good results. Patient continues on constant observation for safety due to angry, irritable mood and psychotic sx. He continues to be irritable at times and needed prn Haldol and Ativan. PT does appear restless and may benefit from a small dose of Clonazepam for possible akathisia. Plan: Continue one to one for safely  Started on Clonazepam 0.25mg po daily  for restlessness secondary to antipsychotics due to poor sleep, Klonopin increased to 1mg at hs.  Paperwork begun to transfer patient to [facility]. Invega Sustenna 156mg given 4/16. Trazodone 50mg po q 6pm and may have another prn of 50mg at qhs if needed.   Risperidone 2mg BID Haldol 5mg/Ativan 2mg prns  Invega Sustenna given 5/15, excessive drooling  Atropine gtts SL ordered.  Cogentin started as standing due to hand tremors with improved sx noted
Patient is a 64 y/o M with reported hx of schizophrenia sent in from [facility] for report of agitation.  Was agitated in [place] ED, and transferred to [facility] for inpatient psychiatric admission for safety and tx.  Adjusting very slowly to unit.  Speech is difficult to understand, and patient does not give a lot of information.  was initially started on Seroquel, changed to Haldol, then to Risperdal. More verbal on 4/8, increased Risperdal to 1mg bid.  on 4/9, increased Risperdal to 1qam, 2mg hs.  Improved sx
Patient is a 62 y/o M with reported hx of schizophrenia sent in from [facility] residence for report of agitation.  Was agitated in [place] ED, and transferred to [facility] for inpatient psychiatric admission for safety and tx. Was initially started on Seroquel, changed to Haldol, then to Risperdal. More verbal on 4/8, increased Risperdal to 1mg bid.  on 4/9, increased Risperdal to 1qam, 2mg hs.  Improved sx. Began Invega Sustenna on 4/11. Combative behavior on 4/12, stat Haldol and Ativan IM with good results. Patient continues on constant observation for safety due to angry, irritable mood and psychotic sx. He continues to be irritable at times and needed prn Haldol and Ativan. PT does appear restless and may benefit from a small dose of Clonazepam for possible akathisia. Plan: Continue one to one for safely  Started on Clonazepam 0.25mg po daily  for restlessness secondary to antipsychotics due to poor sleep, Klonopin increased to 1mg at hs.  Paperwork begun to transfer patient to [facility]. Invega Sustenna 156mg given 4/16. Trazodone 50mg po q 6pm and may have another prn of 50mg at qhs if needed.   Risperidone 2mg BID   Haldol 5mg/Ativan 2mg prns    Invega Sustenna started 5/15, excessive drooling    Atropine gtts SL ordered.    Cogentin started as standing due to hand tremors with improved sx noted.  Pt cannot return to [facility].  will be applying for [facility]  Invega Sustena Inj due 6/11
Patient is a 62 y/o M with reported hx of schizophrenia sent in from [facility] residence for report of agitation.  Was agitated in [place] ED, and transferred to [facility] for inpatient psychiatric admission for safety and tx. Was initially started on Seroquel, changed to Haldol, then to Risperdal. More verbal on 4/8, increased Risperdal to 1mg bid.  on 4/9, increased Risperdal to 1qam, 2mg hs.  Improved sx. Began Invega Sustenna on 4/11. Combative behavior on 4/12, stat Haldol and Ativan IM with good results. Patient continues on constant observation for safety due to angry, irritable mood and psychotic sx. He continues to be irritable at times and needed prn Haldol and Ativan. PT does appear restless and may benefit from a small dose of Clonazepam for possible akathisia. Plan: Continue one to one for safely  Started on Clonazepam 0.25mg po daily  for restlessness secondary to antipsychotics due to poor sleep, Klonopin increased to 1mg at hs.  Paperwork begun to transfer patient to [facility]. Invega Sustenna 156mg given 4/16. Trazodone 50mg po q 6pm and may have another prn of 50mg at qhs if needed.   Risperidone 2mg BID   Haldol 5mg/Ativan 2mg prns    Invega Sustenna started 5/15, excessive drooling    Atropine gtts SL ordered.    Cogentin started as standing due to hand tremors with improved sx noted.  Pt cannot return to [facility].  will be applying for [facility]  Invega Sustena Inj due 7/2.  Court retention for 6 mnths on 6/11
Patient is a 64 y/o M with reported hx of schizophrenia sent in from [facility] for report of agitation.  Was agitated in [place] ED, and transferred to [facility] for inpatient psychiatric admission for safety and tx. Was initially started on Seroquel, changed to Haldol, then to Risperdal. More verbal on 4/8, increased Risperdal to 1mg bid.  on 4/9, increased Risperdal to 1qam, 2mg hs.  Improved sx. Began Invega Sustenna on 4/11. Combative behavior on 4/12, stat Haldol and Ativan IM with good results. Patient continues on constant observation for safety due to angry, irritable mood and psychotic sx. He continues to be irritable at times and needed prn Haldol and Ativan. PT does appear restless and may benefit from a small dose of Clonazepam for possible akathisia. Plan: Continue one to one for safely  Started on Clonazepam 0.25mg po daily  for restlessness secondary to antipsychotics due to poor sleep, Klonopin increased to 1mg at hs.  Paperwork begun to transfer patient to [facility]. Invega Sustenna 156mg given 4/16. Trazodone 50mg po q 6pm and may have another prn of 50mg at qhs if needed.   Risperidone 2mg BID   Haldol 5mg/Ativan 2mg prns    Invega Sustenna started 5/15, excessive drooling    Atropine gtts SL ordered.    Cogentin started as standing due to hand tremors with improved sx noted.
Patient is a 62 y/o M with [facility] hx of [facility].  Was agitated in [place] ED, and transferred to [facility] for inpatient psychiatric admission for safety and tx. Was initially started on Seroquel, changed to Haldol, then to Risperdal. More verbal on 4/8, increased Risperdal to 1mg bid.  on 4/9, increased Risperdal to 1qam, 2mg hs.  Improved sx. Began Invega Sustenna on 4/11. Combative behavior on 4/12, stat Haldol and Ativan IM with good results. Patient continues on constant observation for safety due to angry, irritable mood and psychotic sx. He continues to be irritable at times and needed prn Haldol and Ativan. PT does appear restless and may benefit from a small dose of Clonazepam for possible akathisia. Plan: Continue one to one for safely  Started on Clonazepam 0.25mg po daily  for restlessness secondary to antipsychotics due to poor sleep, Klonopin increased to 1mg at hs.  Paperwork begun to transfer patient to [facility]. Invega Sustenna 156mg given 4/16. Trazodone 50mg po q 6pm and may have another prn of 50mg at qhs if needed.   Risperidone 2mg BID Haldol 5mg/Ativan 2mg prns  Invega Sustenna due 5/15, excessive drooling  Atropine gtts SL ordered.  Cogentin started as standing due to hand tremors with improved sx noted
Patient is a 64 y/o M with reported hx of schizophrenia sent in from [facility] for report of agitation.  Was agitated in [place] ED, and transferred to [facility] for inpatient psychiatric admission for safety and tx. Was initially started on Seroquel, changed to Haldol, then to Risperdal. More verbal on 4/8, increased Risperdal to 1mg bid.  on 4/9, increased Risperdal to 1qam, 2mg hs.  Improved sx. Began Invega Sustenna on 4/11. Combative behavior on 4/12, stat Haldol and Ativan IM with good results. Patient continues on constant observation for safety due to angry, irritable mood and psychotic sx. He continues to be irritable at times and needed prn Haldol and Ativan. PT does appear restless and may benefit from a small dose of Clonazepam for possible akathisia.   Plan: Continue one to one for safely  Started on Clonazepam 0.25mg po BID for restlessness secondary to antipsychotics due to poor sleep, Klonopin increased to 1mg at hs.  Paperwork begun to transfer patient to [facility]  Invega Sustenna 156mg given 4/16.  Risperidone 2mg BID  Haldol 5mg/Ativan 2mg prns
Patient is a 62 y/o M with reported hx of schizophrenia sent in from [facility] for report of agitation.  Was agitated in [place] ED, and transferred to [facility] for inpatient psychiatric admission for safety and tx. Was initially started on Seroquel, changed to Haldol, then to Risperdal. More verbal on 4/8, increased Risperdal to 1mg bid.  on 4/9, increased Risperdal to 1qam, 2mg hs.  Improved sx. Began Invega Sustenna on 4/11. Combative behavior on 4/12, stat Haldol and Ativan IM with good results. Next dose of Invega Sustenna  due 4/16. Calm since the incident cooperative with writer on 4/13, denies SI/HI. Fair behavioral control. Provided nicotine inhaler for cravings. c/w current psych regimen.
Patient is a 64 y/o M with reported hx of schizophrenia sent in from [facility] for report of agitation.  Was agitated in [place] ED, and transferred to [facility] for inpatient psychiatric admission for safety and tx. Was initially started on Seroquel, changed to Haldol, then to Risperdal. More verbal on 4/8, increased Risperdal to 1mg bid.  on 4/9, increased Risperdal to 1qam, 2mg hs.  Improved sx. Began Invega Sustenna on 4/11. Combative behavior on 4/12, stat Haldol and Ativan IM with good results. Patient continues on constant observation for safety due to angry, irritable mood and psychotic sx. He continues to be irritable at times and needed prn Haldol and Ativan. PT does appear restless and may benefit from a small dose of Clonazepam for possible akathisia. Plan: Continue one to one for safely  Started on Clonazepam 0.25mg po daily  for restlessness secondary to antipsychotics due to poor sleep, Klonopin increased to 1mg at hs.  Paperwork begun to transfer patient to [facility]. Invega Sustenna 156mg given 4/16. Trazodone 50mg po q 6pm and may have another prn of 50mg at qhs if needed.   Risperidone 2mg BID   Haldol 5mg/Ativan 2mg prns    Invega Sustenna started 5/15, excessive drooling    Atropine gtts SL ordered.    Cogentin started as standing due to hand tremors with improved sx noted.
Patient is a 64 y/o M with reported hx of schizophrenia sent in from [facility] for report of agitation.  Was agitated in [place] ED, and transferred to [facility] for inpatient psychiatric admission for safety and tx. Was initially started on Seroquel, changed to Haldol, then to Risperdal. More verbal on 4/8, increased Risperdal to 1mg bid.  on 4/9, increased Risperdal to 1qam, 2mg hs.  Improved sx. Began Invega Sustenna on 4/11. Combative behavior on 4/12, stat Haldol and Ativan IM with good results. Patient continues on constant observation for safety due to angry, irritable mood and psychotic sx. He continues to be irritable at times and needed prn Haldol and Ativan. PT does appear restless and may benefit from a small dose of Clonazepam for possible akathisia. Plan: Continue one to one for safely  Started on Clonazepam 0.25mg po daily  for restlessness secondary to antipsychotics due to poor sleep, Klonopin increased to 1mg at hs.  Paperwork begun to transfer patient to [facility]. Invega Sustenna 156mg given 4/16. Trazodone 50mg po q 6pm and may have another prn of 50mg at qhs if needed.   Risperidone 2mg BID Haldol 5mg/Ativan 2mg prns  Invega Sustenna due 5/15, excessive drooling  Atropine gtts SL ordered.  Cogentin started as standing due to hand tremors
Patient is a 62 y/o M with reported hx of schizophrenia sent in from [facility] residence for report of agitation.  Was agitated in [place] ED, and transferred to [facility] for inpatient psychiatric admission for safety and tx. Was initially started on Seroquel, changed to Haldol, then to Risperdal. More verbal on 4/8, increased Risperdal to 1mg bid.  on 4/9, increased Risperdal to 1qam, 2mg hs.  Improved sx. Began Invega Sustenna on 4/11. Combative behavior on 4/12, stat Haldol and Ativan IM with good results. Patient continues on constant observation for safety due to angry, irritable mood and psychotic sx. He continues to be irritable at times and needed prn Haldol and Ativan. PT does appear restless and may benefit from a small dose of Clonazepam for possible akathisia. Plan: Continue one to one for safely  Started on Clonazepam 0.25mg po daily  for restlessness secondary to antipsychotics due to poor sleep, Klonopin increased to 1mg at hs.  Paperwork begun to transfer [facility] to [facility]. Invega Sustenna 156mg given 4/16. Trazodone 50mg po q 6pm and may have another prn of 50mg at qhs if needed.   Risperidone 2mg BID   Haldol 5mg/Ativan 2mg prns    Invega Sustenna started 5/15, excessive drooling    Atropine gtts SL ordered.    Cogentin started as standing due to hand tremors with improved sx noted.  Pt cannot return to [facility].  will be applying for [facility]
Patient is a 62 y/o M with reported hx of schizophrenia sent in from [facility] for report of agitation.  Was agitated in [place] ED, and transferred to [facility] for inpatient psychiatric admission for safety and tx. Was initially started on Seroquel, changed to Haldol, then to Risperdal. More verbal on 4/8, increased Risperdal to 1mg bid.  on 4/9, increased Risperdal to 1qam, 2mg hs.  Improved sx. Began Invega Sustenna on 4/11. Combative behavior on 4/12, stat Haldol and Ativan IM with good results. Patient continues on constant observation for safety due to angry, irritable mood and psychotic sx. He continues to be irritable at times and needed prn Haldol and Ativan. PT does appear restless and may benefit from a small dose of Clonazepam for possible akathisia. Plan: Continue one to one for safely  Started on Clonazepam 0.25mg po daily  for restlessness secondary to antipsychotics due to poor sleep, Klonopin increased to 1mg at hs.  Paperwork begun to transfer patient to [facility]. Invega Sustenna 156mg given 4/16. Trazodone 50mg po q 6pm and may have another prn of 50mg at qhs if needed.   Risperidone 2mg BID Haldol 5mg/Ativan 2mg prns  Invega Sustenna given 5/15, excessive drooling  Atropine gtts SL ordered.  Cogentin started as standing due to hand tremors with improved sx noted.  Hearing on 5/10 to apply to [facility]
Patient is a 64 y/o M with reported hx of schizophrenia sent in from [facility] for report of agitation.  Was agitated in [place] ED, and transferred to [facility] for inpatient psychiatric admission for safety and tx. Was initially started on Seroquel, changed to Haldol, then to Risperdal. More verbal on 4/8, increased Risperdal to 1mg bid.  on 4/9, increased Risperdal to 1qam, 2mg hs.  Improved sx. Began Invega Sustenna on 4/11. Combative behavior on 4/12, stat Haldol and Ativan IM with good results. Patient continues on constant observation for safety due to angry, irritable mood and psychotic sx. He continues to be irritable at times and needed prn Haldol and Ativan. PT does appear restless and may benefit from a small dose of Clonazepam for possible akathisia. Plan: Continue one to one for safely  Started on Clonazepam 0.25mg po daily  for restlessness secondary to antipsychotics due to poor sleep, Klonopin increased to 1mg at hs.  Paperwork begun to transfer patient to [facility]. Invega Sustenna 156mg given 4/16. Trazodone 50mg po q 6pm and may have another prn of 50mg at qhs if needed.   Risperidone 2mg BID Haldol 5mg/Ativan 2mg prns  Invega Sustenna due 5/15, excessive drooling  Atropine gtts SL ordered.  Cogentin started as standing due to hand tremors with improved sx noted
Patient is a 62 y/o M with reported hx of schizophrenia sent in from [facility] for report of agitation.  Was agitated in [place] ED, and transferred to [facility] for inpatient psychiatric admission for safety and tx. Was initially started on Seroquel, changed to Haldol, then to Risperdal. More verbal on 4/8, increased Risperdal to 1mg bid.  on 4/9, increased Risperdal to 1qam, 2mg hs.  Improved sx. Began Invega Sustenna on 4/11. Combative behavior on 4/12, stat Haldol and Ativan IM with good results. Patient continues on constant observation for safety due to angry, irritable mood and psychotic sx. He continues to be irritable at times and needed prn Haldol and Ativan. PT does appear restless and may benefit from a small dose of Clonazepam for possible akathisia. Plan: Continue one to one for safely  Started on Clonazepam 0.25mg po daily  for restlessness secondary to antipsychotics due to poor sleep, Klonopin increased to 1mg at hs.  Paperwork begun to transfer patient to [facility]. Invega Sustenna 156mg given 4/16. Trazodone 50mg po q 6pm and may have another prn of 50mg at qhs if needed.   Risperidone 2mg BID   Haldol 5mg/Ativan 2mg prns    Invega Sustenna started 5/15, excessive drooling    Atropine gtts SL ordered.    Cogentin started as standing due to hand tremors with improved sx noted.
Patient is a 62 y/o M with reported hx of schizophrenia sent in from [facility] for report of agitation.  Was agitated in [place] ED, and transferred to [facility] for inpatient psychiatric admission for safety and tx. Was initially started on Seroquel, changed to Haldol, then to Risperdal. More verbal on 4/8, increased Risperdal to 1mg bid.  on 4/9, increased Risperdal to 1qam, 2mg hs.  Improved sx. Began Invega Sustenna on 4/11. Combative behavior on 4/12, stat Haldol and Ativan IM with good results. Patient continues on constant observation for safety due to angry, irritable mood and psychotic sx. He continues to be irritable at times and needed prn Haldol and Ativan. PT does appear restless and may benefit from a small dose of Clonazepam for possible akathisia. Plan: Continue one to one for safely  Started on Clonazepam 0.25mg po daily  for restlessness secondary to antipsychotics due to poor sleep, Klonopin increased to 1mg at hs.  Paperwork begun to transfer patient to [facility]. Invega Sustenna 156mg given 4/16. Trazodone 50mg po q 6pm and may have another prn of 50mg at qhs if needed.   Risperidone 2mg BID Haldol 5mg/Ativan 2mg prns  Invega Sustenna due 5/15, excessive drooling  Atropine gtts SL ordered.  Cogentin started as standing due to hand tremors with improved sx noted
Patient is a 64 y/o M with reported hx of schizophrenia sent in from [facility] for report of agitation.  Was agitated in [place] ED, and transferred to [facility] for inpatient psychiatric admission for safety and tx. Was initially started on Seroquel, changed to Haldol, then to Risperdal. More verbal on 4/8, increased Risperdal to 1mg bid.  on 4/9, increased Risperdal to 1qam, 2mg hs.  Improved sx. Began Invega Sustenna on 4/11. Combative behavior on 4/12, stat Haldol and Ativan IM with good results. Patient continues on constant observation for safety due to angry, irritable mood and psychotic sx. He continues to be irritable at times and needed prn Haldol and Ativan. PT does appear restless and may benefit from a small dose of Clonazepam for possible akathisia. Plan: Continue one to one for safely  Started on Clonazepam 0.25mg po daily  for restlessness secondary to antipsychotics due to poor sleep, Klonopin increased to 1mg at hs.  Paperwork begun to transfer patient to [facility]. Invega Sustenna 156mg given 4/16. Trazodone 50mg po q 6pm and may have another prn of 50mg at qhs if needed.   Risperidone 2mg BID Haldol 5mg/Ativan 2mg prns  Invega Sustenna due 5/15, excessive drooling  Atropine gtts SL ordered.  Cogentin started as standing due to hand tremors with improved sx noted
Patient is a 62 y/o M with reported hx of schizophrenia sent in from [facility] residence for report of agitation.  Was agitated in [place] ED, and transferred to [facility] for inpatient psychiatric admission for safety and tx. Was initially started on Seroquel, changed to Haldol, then to Risperdal. More verbal on 4/8, increased Risperdal to 1mg bid.  on 4/9, increased Risperdal to 1qam, 2mg hs.  Improved sx. Began Invega Sustenna on 4/11. Combative behavior on 4/12, stat Haldol and Ativan IM with good results. Patient continues on constant observation for safety due to angry, irritable mood and psychotic sx. He continues to be irritable at times and needed prn Haldol and Ativan. PT does appear restless and may benefit from a small dose of Clonazepam for possible akathisia. Plan: Continue one to one for safely  Started on Clonazepam 0.25mg po daily  for restlessness secondary to antipsychotics due to poor sleep, Klonopin increased to 1mg at hs.  Paperwork begun to transfer patient to [facility]. Invega Sustenna 156mg given 4/16. Trazodone 50mg po q 6pm and may have another prn of 50mg at qhs if needed.   Risperidone 2mg BID   Haldol 5mg/Ativan 2mg prns    Invega Sustenna started 5/15, excessive drooling    Atropine gtts SL ordered.    Cogentin started as standing due to hand tremors with improved sx noted.  Pt cannot return to [facility].  will be applying for [facility]  Invega Sustena Inj due 6/11
Patient is a 62 y/o M with reported hx of schizophrenia sent in from [facility] for report of agitation.  Was agitated in [place] ED, and transferred to [facility] for inpatient psychiatric admission for safety and tx. Was initially started on Seroquel, changed to Haldol, then to Risperdal. More verbal on 4/8, increased Risperdal to 1mg bid.  on 4/9, increased Risperdal to 1qam, 2mg hs.  Improved sx. Began Invega Sustenna on 4/11. Combative behavior on 4/12, stat Haldol and Ativan IM with good results. Patient continues on constant observation for safety due to angry, irritable mood and psychotic sx. He continues to be irritable at times and needed prn Haldol and Ativan. PT does appear restless and may benefit from a small dose of Clonazepam for possible akathisia. Plan: Continue one to one for safely  Started on Clonazepam 0.25mg po daily  for restlessness secondary to antipsychotics due to poor sleep, Klonopin increased to 1mg at hs.  Paperwork begun to transfer patient to [facility]. Invega Sustenna 156mg given 4/16. Trazodone 50mg po q 6pm and may have another prn of 50mg at qhs if needed.   Risperidone 2mg BID   Haldol 5mg/Ativan 2mg prns    Invega Sustenna started 5/15, excessive drooling    Atropine gtts SL ordered.    Cogentin started as standing due to hand tremors with improved sx noted.
Patient is a 62 y/o M with reported hx of schizophrenia sent in from [facility] for report of agitation.  Was agitated in [place] ED, and transferred to [facility] for inpatient psychiatric admission for safety and tx. Was initially started on Seroquel, changed to Haldol, then to Risperdal. More verbal on 4/8, increased Risperdal to 1mg bid.  on 4/9, increased Risperdal to 1qam, 2mg hs.  Improved sx. Began Invega Sustenna on 4/11. Combative behavior on 4/12, stat Haldol and Ativan IM with good results. Patient continues on constant observation for safety due to angry, irritable mood and psychotic sx. He continues to be irritable at times and needed prn Haldol and Ativan. PT does appear restless and may benefit from a small dose of Clonazepam for possible akathisia. Plan: Continue one to one for safely  Started on Clonazepam 0.25mg po daily  for restlessness secondary to antipsychotics due to poor sleep, Klonopin increased to 1mg at hs.  Paperwork begun to transfer patient to [facility]. Invega Sustenna 156mg given 4/16. Trazodone 50mg po q 6pm and may have another prn of 50mg at qhs if needed.   Risperidone 2mg BID   Haldol 5mg/Ativan 2mg prns    Invega Sustenna started 5/15, excessive drooling    Atropine gtts SL ordered.    Cogentin started as standing due to hand tremors with improved sx noted.
Patient is a 64 y/o M with reported hx of schizophrenia sent in from [facility] for report of agitation.  Was agitated in [place] ED, and transferred to [facility] for inpatient psychiatric admission for safety and tx. Was initially started on Seroquel, changed to Haldol, then to Risperdal. More verbal on 4/8, increased Risperdal to 1mg bid.  on 4/9, increased Risperdal to 1qam, 2mg hs.  Improved sx. Began Invega Sustenna on 4/11. Combative behavior on 4/12, stat Haldol and Ativan IM with good results. Patient continues on constant observation for safety due to angry, irritable mood and psychotic sx. He continues to be irritable at times and needed prn Haldol and Ativan. PT does appear restless and may benefit from a small dose of Clonazepam for possible akathisia. Plan: Continue one to one for safely  Started on Clonazepam 0.25mg po daily  for restlessness secondary to antipsychotics due to poor sleep, Klonopin increased to 1mg at hs.  Paperwork begun to transfer patient to [facility]. Invega Sustenna 156mg given 4/16. Trazodone 50mg po q 6pm and may have another prn of 50mg at qhs if needed.   Risperidone 2mg BID Haldol 5mg/Ativan 2mg prns  Invega Sustenna due 5/15, excessive drooling  Atropine gtts SL ordered.  Cogentin started as standing due to hand tremors with improved sx noted
Patient is a 64 y/o M with reported hx of schizophrenia sent in from [facility] for report of agitation.  Was agitated in [place] ED, and transferred to [facility] for inpatient psychiatric admission for safety and tx. Was initially started on Seroquel, changed to Haldol, then to Risperdal. More verbal on 4/8, increased Risperdal to 1mg bid.  on 4/9, increased Risperdal to 1qam, 2mg hs.  Improved sx. Began Invega Sustenna on 4/11. Combative behavior on 4/12, stat Haldol and Ativan IM with good results. Patient continues on constant observation for safety due to angry, irritable mood and psychotic sx. He continues to be irritable at times and needed prn Haldol and Ativan. PT does appear restless and may benefit from a small dose of Clonazepam for possible akathisia. Plan: Continue one to one for safely  Started on Clonazepam 0.25mg po daily  for restlessness secondary to antipsychotics due to poor sleep, Klonopin increased to 1mg at hs.  Paperwork begun to transfer patient to [facility]. Invega Sustenna 156mg given 4/16. Trazodone 50mg po q 6pm and may have another prn of 50mg at qhs if needed.   Risperidone 2mg BID Haldol 5mg/Ativan 2mg prns  Invega Sustenna due 5/15, excessive drooling  Atropine gtts SL ordered.  Cogentin started as standing due to hand tremors with improved sx noted
Patient is a 64 y/o M with reported hx of schizophrenia sent in from [facility] residence for report of agitation.  Was agitated in [place] ED, and transferred to [facility] for inpatient psychiatric admission for safety and tx. Was initially started on Seroquel, changed to Haldol, then to Risperdal. More verbal on 4/8, increased Risperdal to 1mg bid.  on 4/9, increased Risperdal to 1qam, 2mg hs.  Improved sx. Began Invega Sustenna on 4/11. Combative behavior on 4/12, stat Haldol and Ativan IM with good results. Patient continues on constant observation for safety due to angry, irritable mood and psychotic sx. He continues to be irritable at times and needed prn Haldol and Ativan. PT does appear restless and may benefit from a small dose of Clonazepam for possible akathisia. Plan: Continue one to one for safely  Started on Clonazepam 0.25mg po daily  for restlessness secondary to antipsychotics due to poor sleep, Klonopin increased to 1mg at hs.  Paperwork begun to transfer [facility] to [facility]. Invega Sustenna 156mg given 4/16. Trazodone 50mg po q 6pm and may have another prn of 50mg at qhs if needed.   Risperidone 2mg BID   Haldol 5mg/Ativan 2mg prns    Invega Sustenna started 5/15, excessive drooling    Atropine gtts SL ordered.    Cogentin started as standing due to hand tremors with improved sx noted.  Pt cannot return to [facility].  will be applying for [facility]
Patient is a 62 y/o M with reported hx of schizophrenia sent in from [facility] for report of agitation.  Was agitated in [place] ED, and transferred to [facility] for inpatient psychiatric admission for safety and tx. Was initially started on Seroquel, changed to Haldol, then to Risperdal. More verbal on 4/8, increased Risperdal to 1mg bid.  on 4/9, increased Risperdal to 1qam, 2mg hs.  Improved sx. Began Invega Sustenna on 4/11. Combative behavior on 4/12, stat Haldol and Ativan IM with good results. Patient continues on constant observation for safety due to angry, irritable mood and psychotic sx. He continues to be irritable at times and needed prn Haldol and Ativan. PT does appear restless and may benefit from a small dose of Clonazepam for possible akathisia.   Plan: Continue one to one for safely  Started on Clonazepam 0.25mg po daily  for restlessness secondary to antipsychotics due to poor sleep, Klonopin increased to 1mg at hs.  Paperwork begun to transfer patient to [facility]  Invega Sustenna 156mg given 4/16.  Trazodone 50mg po q 6pm and may have another prn of 50mg at qhs if needed.   Risperidone 2mg BID  Haldol 5mg/Ativan 2mg prns  Invega Sustenna due 5/15, excessive drooling  Atropine gtts SL ordered
Patient is a 62 y/o M with reported hx of schizophrenia sent in from [facility] for report of agitation.  Was agitated in [place] ED, and transferred to [facility] for inpatient psychiatric admission for safety and tx. Was initially started on Seroquel, changed to Haldol, then to Risperdal. More verbal on 4/8, increased Risperdal to 1mg bid.  on 4/9, increased Risperdal to 1qam, 2mg hs.  Improved sx. Began Invega Sustenna on 4/11. Combative behavior on 4/12, stat Haldol and Ativan IM with good results. Patient continues on constant observation for safety due to angry, irritable mood and psychotic sx. He continues to be irritable at times and needed prn Haldol and Ativan. PT does appear restless and may benefit from a small dose of Clonazepam for possible akathisia.   Plan: Continue one to one for safely  Started on Clonazepam 0.25mg po daily  for restlessness secondary to antipsychotics due to poor sleep, Klonopin increased to 1mg at hs.  Paperwork begun to transfer patient to [facility]  Invega Sustenna 156mg given 4/16.  Trazodone 50mg po q 6pm and may have another prn of 50mg at qhs if needed.   Risperidone 2mg BID  Haldol 5mg/Ativan 2mg prns  Invega Sustenna due 5/15, excessive drooling  Atropine gtts SL ordered
Patient is a 62 y/o M with reported hx of schizophrenia sent in from [facility] for report of agitation.  Was agitated in [place] ED, and transferred to [facility] for inpatient psychiatric admission for safety and tx. Was initially started on Seroquel, changed to Haldol, then to Risperdal. More verbal on 4/8, increased Risperdal to 1mg bid.  on 4/9, increased Risperdal to 1qam, 2mg hs.  Improved sx. Began Invega Sustenna on 4/11. Combative behavior on 4/12, stat Haldol and Ativan IM with good results. Patient continues on constant observation for safety due to angry, irritable mood and psychotic sx. He continues to be irritable at times and needed prn Haldol and Ativan. PT does appear restless and may benefit from a small dose of Clonazepam for possible akathisia.   Plan: Continue one to one for safely  Started on Clonazepam 0.25mg po daily  for restlessness secondary to antipsychotics due to poor sleep, Klonopin increased to 1mg at hs.  Paperwork begun to transfer patient to [facility]  Invega Sustenna 156mg given 4/16.  Trazodone 50mg po q 6pm and may have another prn of 50mg at qhs if needed.   Risperidone 2mg BID  Haldol 5mg/Ativan 2mg prns  Invega Sustenna due 5/15
Patient is a 64 y/o M with reported hx of schizophrenia sent in from [facility] for report of agitation.  Was agitated in [place] ED, and transferred to [facility] for inpatient psychiatric admission for safety and tx. Was initially started on Seroquel, changed to Haldol, then to Risperdal. More verbal on 4/8, increased Risperdal to 1mg bid.  on 4/9, increased Risperdal to 1qam, 2mg hs.  Improved sx. Began Invega Sustenna on 4/11. Combative behavior on 4/12, stat Haldol and Ativan IM with good results. Next dose of Invega Sustenna  due 4/16. Calm since the incident cooperative with writer on 4/13, denies SI/HI. Fair behavioral control. Provided nicotine inhaler for cravings. c/w current psych regimen.
Patient is a 64 y/o M with reported hx of schizophrenia sent in from [facility] residence for report of agitation.  Was agitated in [place] ED, and transferred to [facility] for inpatient psychiatric admission for safety and tx. Was initially started on Seroquel, changed to Haldol, then to Risperdal. More verbal on 4/8, increased Risperdal to 1mg bid.  on 4/9, increased Risperdal to 1qam, 2mg hs.  Improved sx. Began Invega Sustenna on 4/11. Combative behavior on 4/12, stat Haldol and Ativan IM with good results. Patient continues on constant observation for safety due to angry, irritable mood and psychotic sx. He continues to be irritable at times and needed prn Haldol and Ativan. PT does appear restless and may benefit from a small dose of Clonazepam for possible akathisia. Plan: Continue one to one for safely  Started on Clonazepam 0.25mg po daily  for restlessness secondary to antipsychotics due to poor sleep, Klonopin increased to 1mg at hs.  Paperwork begun to transfer patient to [facility]. Invega Sustenna 156mg given 4/16. Trazodone 50mg po q 6pm and may have another prn of 50mg at qhs if needed.   Risperidone 2mg BID   Haldol 5mg/Ativan 2mg prns    Invega Sustenna started 5/15, excessive drooling    Atropine gtts SL ordered.    Cogentin started as standing due to hand tremors with improved sx noted.  [facility]  will be applying for [facility]  Invega Sustena Inj due 7/2.  Court retention for 6 mnths on 6/11..for transfer to [facility] on 6/20
Patient is a 62 y/o M with reported hx of schizophrenia sent in from [facility] for report of agitation.  Was agitated in [place] ED, and transferred to [facility] for inpatient psychiatric admission for safety and tx. Was initially started on Seroquel, changed to Haldol, then to Risperdal. More verbal on 4/8, increased Risperdal to 1mg bid.  on 4/9, increased Risperdal to 1qam, 2mg hs.  Improved sx. Began Invega Sustenna on 4/11. Combative behavior on 4/12, stat Haldol and Ativan IM with good results. Patient continues on constant observation for safety due to angry, irritable mood and psychotic sx. He continues to be irritable at times and needed prn Haldol and Ativan. PT does appear restless and may benefit from a small dose of Clonazepam for possible akathisia.   Plan: Continue one to one for safely  Started on Clonazepam 0.25mg po BID for restlessness secondary to antipsychotics due to poor sleep, Klonopin increased to 1mg at hs.  Paperwork begun to transfer patient to [facility]  Invega Sustenna 156mg given 4/16.  Risperidone 2mg BID  Haldol 5mg/Ativan 2mg prns
Patient is a 62 y/o M with reported hx of schizophrenia sent in from [facility] for report of agitation.  Was agitated in [place] ED, and transferred to [facility] for inpatient psychiatric admission for safety and tx. Was initially started on Seroquel, changed to Haldol, then to Risperdal. More verbal on 4/8, increased Risperdal to 1mg bid.  on 4/9, increased Risperdal to 1qam, 2mg hs.  Improved sx. Began Invega Sustenna on 4/11. Combative behavior on 4/12, stat Haldol and Ativan IM with good results. Patient continues on constant observation for safety due to angry, irritable mood and psychotic sx. He continues to be irritable at times and needed prn Haldol and Ativan, and is drowsy now.   Plan:  Continue one to one  Invega Sustenna 156mg given 4/16.  Risperidone 2mg BID  Haldol 5mg/Ativan 2mg prns  PRNS
Patient is a 64 y/o M with reported hx of schizophrenia sent in from [facility] for report of agitation.  Was agitated in [place] ED, and transferred to [facility] for inpatient psychiatric admission for safety and tx. Was initially started on Seroquel, changed to Haldol, then to Risperdal. More verbal on 4/8, increased Risperdal to 1mg bid.  on 4/9, increased Risperdal to 1qam, 2mg hs.  Improved sx. Began Invega Sustenna on 4/11. Combative behavior on 4/12, stat Haldol and Ativan IM with good results. Patient continues on constant observation for safety due to angry, irritable mood and psychotic sx. He continues to be irritable at times and needed prn Haldol and Ativan. PT does appear restless and may benefit from a small dose of Clonazepam for possible akathisia. Plan: Continue one to one for safely  Started on Clonazepam 0.25mg po daily  for restlessness secondary to antipsychotics due to poor sleep, Klonopin increased to 1mg at hs.  Paperwork begun to transfer patient to [facility]. Invega Sustenna 156mg given 4/16. Trazodone 50mg po q 6pm and may have another prn of 50mg at qhs if needed.   Risperidone 2mg BID   Haldol 5mg/Ativan 2mg prns    Invega Sustenna given 5/15, excessive drooling    Atropine gtts SL ordered.    Cogentin started as standing due to hand tremors with improved sx noted.
Patient is a 64 y/o M with reported hx of schizophrenia sent in from [facility] for report of agitation.  Was agitated in [place] ED, and transferred to [facility] for inpatient psychiatric admission for safety and tx. Was initially started on Seroquel, changed to Haldol, then to Risperdal. More verbal on 4/8, increased Risperdal to 1mg bid.  on 4/9, increased Risperdal to 1qam, 2mg hs.  Improved sx. Began Invega Sustenna on 4/11. Combative behavior on 4/12, stat Haldol and Ativan IM with good results. Patient continues on constant observation for safety due to angry, irritable mood and psychotic sx. He continues to be irritable at times and needed prn Haldol and Ativan, and is drowsy now.   Plan:  Continue one to one  Invega Sustenna 156mg given 4/16.  Risperidone 2mg BID  Haldol 5mg/Ativan 2mg prns  PRNS
Patient is a 62 y/o M with reported hx of schizophrenia sent in from [facility] for report of agitation.  Was agitated in [place] ED, and transferred to [facility] for inpatient psychiatric admission for safety and tx. Was initially started on Seroquel, changed to Haldol, then to Risperdal. More verbal on 4/8, increased Risperdal to 1mg bid.  on 4/9, increased Risperdal to 1qam, 2mg hs.  Improved sx. Began Invega Sustenna on 4/11. Combative behavior on 4/12, stat Haldol and Ativan IM with good results. Patient continues on constant observation for safety due to angry, irritable mood and psychotic sx. He continues to be irritable at times and needed prn Haldol and Ativan. PT does appear restless and may benefit from a small dose of Clonazepam for possible akathisia.   Plan: Continue one to one for safely  Started on Clonazepam 0.25mg po daily  for restlessness secondary to antipsychotics due to poor sleep, Klonopin increased to 1mg at hs.  Paperwork begun to transfer patient to [facility]  Invega Sustenna 156mg given 4/16.  Trazodone 50mg po q 6pm and may have another prn of 50mg at qhs if needed.   Risperidone 2mg BID  Haldol 5mg/Ativan 2mg prns
Patient is a 62 y/o M with reported hx of schizophrenia sent in from [facility] for report of agitation.  Was agitated in [place] ED, and transferred to [facility] for inpatient psychiatric admission for safety and tx. Was initially started on Seroquel, changed to Haldol, then to Risperdal. More verbal on 4/8, increased Risperdal to 1mg bid.  on 4/9, increased Risperdal to 1qam, 2mg hs.  Improved sx. Began Invega Sustenna on 4/11. Combative behavior on 4/12, stat Haldol and Ativan IM with good results. Patient continues on constant observation for safety due to angry, irritable mood and psychotic sx. He continues to be irritable at times and needed prn Haldol and Ativan. PT does appear restless and may benefit from a small dose of Clonazepam for possible akathisia.   Plan: Continue one to one for safely  Started on Clonazepam 0.25mg po daily  for restlessness secondary to antipsychotics due to poor sleep, Klonopin increased to 1mg at hs.  Paperwork begun to transfer patient to [facility]  Invega Sustenna 156mg given 4/16.  Trazodone 50mg po q 6pm and may have another prn of 50mg at qhs if needed.   Risperidone 2mg BID  Haldol 5mg/Ativan 2mg prns
Patient is a 62 y/o M with reported hx of schizophrenia sent in from [facility] for report of agitation.  Was agitated in [place] ED, and transferred to [facility] for inpatient psychiatric admission for safety and tx. Was initially started on Seroquel, changed to Haldol, then to Risperdal. More verbal on 4/8, increased Risperdal to 1mg bid.  on 4/9, increased Risperdal to 1qam, 2mg hs.  Improved sx. Began Invega Sustenna on 4/11. Combative behavior on 4/12, stat Haldol and Ativan IM with good results. Patient continues on constant observation for safety due to angry, irritable mood and psychotic sx. He continues to be irritable at times and needed prn Haldol and Ativan. PT does appear restless and may benefit from a small dose of Clonazepam for possible akathisia.   Plan: Continue one to one for safely  Started on Clonazepam 0.25mg po BID for restlessness secondary to antipsychotics due to poor sleep, Klonopin increased to 1mg at hs.  Paperwork begun to transfer patient to [facility]  Invega Sustenna 156mg given 4/16.  Risperidone 2mg BID  Haldol 5mg/Ativan 2mg prns
Patient is a 62 y/o M with reported hx of schizophrenia sent in from [facility] for report of agitation.  Was agitated in [place] ED, and transferred to [facility] for inpatient psychiatric admission for safety and tx. Was initially started on Seroquel, changed to Haldol, then to Risperdal. More verbal on 4/8, increased Risperdal to 1mg bid.  on 4/9, increased Risperdal to 1qam, 2mg hs.  Improved sx. Began Invega Sustenna on 4/11. Combative behavior on 4/12, stat Haldol and Ativan IM with good results. Patient continues on constant observation for safety due to angry, irritable mood and psychotic sx. He continues to be irritable at times and needed prn Haldol and Ativan. PT does appear restless and may benefit from a small dose of Clonazepam for possible akathisia.   Plan: Continue one to one for safely  Started on Clonazepam 0.25mg po daily  for restlessness secondary to antipsychotics due to poor sleep, Klonopin increased to 1mg at hs.  Paperwork begun to transfer patient to [facility]  Invega Sustenna 156mg given 4/16.  Trazodone 50mg po q 6pm and may have another prn of 50mg at qhs if needed.   Risperidone 2mg BID  Haldol 5mg/Ativan 2mg prns
Patient is a 62 y/o M with reported hx of schizophrenia sent in from [facility] for report of agitation.  Was agitated in [place] ED, and transferred to [facility] for inpatient psychiatric admission for safety and tx. Was initially started on Seroquel, changed to Haldol, then to Risperdal. More verbal on 4/8, increased Risperdal to 1mg bid.  on 4/9, increased Risperdal to 1qam, 2mg hs.  Improved sx. Began Invega Sustenna on 4/11. Combative behavior on 4/12, stat Haldol and Ativan IM with good results. Patient continues on constant observation for safety due to angry, irritable mood and psychotic sx. He continues to be irritable at times and needed prn Haldol and Ativan. PT does appear restless and may benefit from a small dose of Clonazepam for possible akathisia.   Plan: Continue one to one for safely  Started on Clonazepam 0.25mg po daily  for restlessness secondary to antipsychotics due to poor sleep, Klonopin increased to 1mg at hs.  Paperwork begun to transfer patient to [facility]  Invega Sustenna 156mg given 4/16.  Trazodone 50mg po q 6pm and may have another prn of 50mg at qhs if needed.   Risperidone 2mg BID  Haldol 5mg/Ativan 2mg prns
Patient is a 64 y/o M with reported hx of schizophrenia sent in from [facility] for report of agitation.  Was agitated in [place] ED, and transferred to [facility] for inpatient psychiatric admission for safety and tx.  Adjusting very slowly to unit.  Speech is difficult to understand, and patient does not give a lot of information.  was initially started on Seroquel, changed to Haldol, then to Risperdal. More verbal on 4/8, increased Risperdal to 1mg bid.  on 4/9, increased Risperdal to 1qam, 2mg hs.  Improved sx. to begin Invega Sustenna on 4/11
Patient is a 64 y/o M with reported hx of schizophrenia sent in from [facility] for report of agitation.  Was agitated in [place] ED, and transferred to [facility] for inpatient psychiatric admission for safety and tx.  Adjusting very slowly to unit.  Speech is difficult to understand, and patient does not give a lot of information.  was initially started on Seroquel, changed to Haldol, then to Risperdal. More verbal on 4/8, increased Risperdal to 1mg bid.  on 4/9, increased Risperdal to 1qam, 2mg hs.  Improved sx. to begin Invega Sustenna on 4/11
Patient is a 62 y/o M with reported hx of schizophrenia sent in from [facility] residence for report of agitation.  Was agitated in [place] ED, and transferred to [facility] for inpatient psychiatric admission for safety and tx. Was initially started on Seroquel, changed to Haldol, then to Risperdal. More verbal on 4/8, increased Risperdal to 1mg bid.  on 4/9, increased Risperdal to 1qam, 2mg hs.  Improved sx. Began Invega Sustenna on 4/11. Combative behavior on 4/12, stat Haldol and Ativan IM with good results. Patient continues on constant observation for safety due to angry, irritable mood and psychotic sx. He continues to be irritable at times and needed prn Haldol and Ativan. PT does appear restless and may benefit from a small dose of Clonazepam for possible akathisia. Plan: Continue one to one for safely  Started on Clonazepam 0.25mg po daily  for restlessness secondary to antipsychotics due to poor sleep, Klonopin increased to 1mg at hs.  Paperwork begun to transfer patient to [facility]. Invega Sustenna 156mg given 4/16. Trazodone 50mg po q 6pm and may have another prn of 50mg at qhs if needed.   Risperidone 2mg BID   Haldol 5mg/Ativan 2mg prns    Invega Sustenna started 5/15, excessive drooling    Atropine gtts SL ordered.    Cogentin started as standing due to hand tremors with improved sx noted.  Pt cannot return to [facility].  will be applying for [facility]  Invega Sustena Inj due 7/2.  Court retention for 6 mnths on 6/11
Patient is a 64 y/o M with reported hx of schizophrenia sent in from [facility] for report of agitation.  Was agitated in [place] ED, and transferred to [facility] for inpatient psychiatric admission for safety and tx. Was initially started on Seroquel, changed to Haldol, then to Risperdal. More verbal on 4/8, increased Risperdal to 1mg bid.  on 4/9, increased Risperdal to 1qam, 2mg hs.  Improved sx. Began Invega Sustenna on 4/11. Combative behavior on 4/12, stat Haldol and Ativan IM with good results. Patient continues on constant observation for safety due to angry, irritable mood and psychotic sx. He continues to be irritable at times and needed prn Haldol and Ativan. PT does appear restless and may benefit from a small dose of Clonazepam for possible akathisia. Plan: Continue one to one for safely  Started on Clonazepam 0.25mg po daily  for restlessness secondary to antipsychotics due to poor sleep, Klonopin increased to 1mg at hs.  Paperwork begun to transfer patient to [facility]. Invega Sustenna 156mg given 4/16. Trazodone 50mg po q 6pm and may have another prn of 50mg at qhs if needed.   Risperidone 2mg BID Haldol 5mg/Ativan 2mg prns  Invega Sustenna due 5/15, excessive drooling  Atropine gtts SL ordered.  Cogentin started as standing due to hand tremors with improved sx noted
Patient is a 64 y/o M with reported hx of schizophrenia sent in from [facility] for report of agitation.  Was agitated in [place] ED, and transferred to [facility] for inpatient psychiatric admission for safety and tx.  Adjusting very slowly to unit.  Speech is difficult to understand, and patient does not give a lot of information.  was initially started on Seroquel, changed to Haldol, then to Risperdal. More verbal on 4/8, increased Risperdal to 1mg bid
Patient is a 64 y/o M with reported hx of schizophrenia sent in from [facility] for report of agitation.  Was agitated in [place] ED, and transferred to [facility] for inpatient psychiatric admission for safety and tx.  Adjusting very slowly to unit.  Speech is difficult to understand, and patient does not give a lot of information
Patient is a 62 y/o M with reported hx of schizophrenia sent in from [facility] for report of agitation.  Was agitated in [place] ED, and transferred to [facility] for inpatient psychiatric admission for safety and tx. Was initially started on Seroquel, changed to Haldol, then to Risperdal. More verbal on 4/8, increased Risperdal to 1mg bid.  on 4/9, increased Risperdal to 1qam, 2mg hs.  Improved sx. Began Invega Sustenna on 4/11. Combative behavior on 4/12, stat Haldol and Ativan IM with good results. Patient continues on constant observation for safety due to angry, irritable mood and psychotic sx. He continues to be irritable at times and needed prn Haldol and Ativan. PT does appear restless and may benefit from a small dose of Clonazepam for possible akathisia.   Plan: Continue one to one while awake for safely  Started on Clonazepam 0.25mg po BID for restlessness secondary to antipsychotics.  Invega Sustenna 156mg given 4/16.  Risperidone 2mg BID  Haldol 5mg/Ativan 2mg prns
Patient is a 62 y/o M with reported hx of schizophrenia sent in from [facility] for report of agitation.  Was agitated in [place] ED, and transferred to [facility] for inpatient psychiatric admission for safety and tx. Was initially started on Seroquel, changed to Haldol, then to Risperdal. More verbal on 4/8, increased Risperdal to 1mg bid.  on 4/9, increased Risperdal to 1qam, 2mg hs.  Improved sx. Began Invega Sustenna on 4/11. Combative behavior on 4/12, stat Haldol and Ativan IM with good results. Patient continues on constant observation for safety due to angry, irritable mood and psychotic sx. He continues to be irritable at times and needed prn Haldol and Ativan. PT does appear restless and may benefit from a small dose of Clonazepam for possible akathisia. Plan: Continue one to one for safely  Started on Clonazepam 0.25mg po daily  for restlessness secondary to antipsychotics due to poor sleep, Klonopin increased to 1mg at hs.  Paperwork begun to transfer patient to [facility]. Invega Sustenna 156mg given 4/16. Trazodone 50mg po q 6pm and may have another prn of 50mg at qhs if needed.   Risperidone 2mg BID Haldol 5mg/Ativan 2mg prns  Invega Sustenna due 5/15, excessive drooling  Atropine gtts SL ordered.  Cogentin started as standing due to hand tremors
Patient is a 62 y/o M with reported hx of schizophrenia sent in from [facility] for report of agitation.  Was agitated in [place] ED, and transferred to [facility] for inpatient psychiatric admission for safety and tx. Was initially started on Seroquel, changed to Haldol, then to Risperdal. More verbal on 4/8, increased Risperdal to 1mg bid.  on 4/9, increased Risperdal to 1qam, 2mg hs.  Improved sx. Began Invega Sustenna on 4/11. Combative behavior on 4/12, stat Haldol and Ativan IM with good results. Patient continues on constant observation for safety due to angry, irritable mood and psychotic sx. He continues to be irritable at times and needed prn Haldol and Ativan. PT does appear restless and may benefit from a small dose of Clonazepam for possible akathisia. Plan: Continue one to one for safely  Started on Clonazepam 0.25mg po daily  for restlessness secondary to antipsychotics due to poor sleep, Klonopin increased to 1mg at hs.  Paperwork begun to transfer patient to [facility]. Invega Sustenna 156mg given 4/16. Trazodone 50mg po q 6pm and may have another prn of 50mg at qhs if needed.   Risperidone 2mg BID Haldol 5mg/Ativan 2mg prns  Invega Sustenna due 5/15, excessive drooling  Atropine gtts SL ordered
Patient is a 62 y/o M with reported hx of schizophrenia sent in from [facility] residence for report of agitation.  Was agitated in [place] ED, and transferred to [facility] for inpatient psychiatric admission for safety and tx. Was initially started on Seroquel, changed to Haldol, then to Risperdal. More verbal on 4/8, increased Risperdal to 1mg bid.  on 4/9, increased Risperdal to 1qam, 2mg hs.  Improved sx. Began Invega Sustenna on 4/11. Combative behavior on 4/12, stat Haldol and Ativan IM with good results. Patient continues on constant observation for safety due to angry, irritable mood and psychotic sx. He continues to be irritable at times and needed prn Haldol and Ativan. PT does appear restless and may benefit from a small dose of Clonazepam for possible akathisia. Plan: Continue one to one for safely  Started on Clonazepam 0.25mg po daily  for restlessness secondary to antipsychotics due to poor sleep, Klonopin increased to 1mg at hs.  Paperwork begun to transfer patient to [facility]. Invega Sustenna 156mg given 4/16. Trazodone 50mg po q 6pm and may have another prn of 50mg at qhs if needed.   Risperidone 2mg BID   Haldol 5mg/Ativan 2mg prns    Invega Sustenna started 5/15, excessive drooling    Atropine gtts SL ordered.    Cogentin started as standing due to hand tremors with improved sx noted.  Pt cannot return to [facility].  will be applying for [facility]  Invega Sustena Inj due 7/9.  Court retention for 6 mnths on 6/11
Patient is a 64 y/o M with reported hx of schizophrenia sent in from [facility] residence for report of agitation.  Was agitated in [place] ED, and transferred to [facility] for inpatient psychiatric admission for safety and tx. Was initially started on Seroquel, changed to Haldol, then to Risperdal. More verbal on 4/8, increased Risperdal to 1mg bid.  on 4/9, increased Risperdal to 1qam, 2mg hs.  Improved sx. Began Invega Sustenna on 4/11. Combative behavior on 4/12, stat Haldol and Ativan IM with good results. Patient continues on constant observation for safety due to angry, irritable mood and psychotic sx. He continues to be irritable at times and needed prn Haldol and Ativan. PT does appear restless and may benefit from a small dose of Clonazepam for possible akathisia. Plan: Continue one to one for safely  Started on Clonazepam 0.25mg po daily  for restlessness secondary to antipsychotics due to poor sleep, Klonopin increased to 1mg at hs.  Paperwork begun to transfer patient to [facility]. Invega Sustenna 156mg given 4/16. Trazodone 50mg po q 6pm and may have another prn of 50mg at qhs if needed.   Risperidone 2mg BID   Haldol 5mg/Ativan 2mg prns    Invega Sustenna started 5/15, excessive drooling    Atropine gtts SL ordered.    Cogentin started as standing due to hand tremors with improved sx noted.  Pt cannot return to [facility].  will be applying for [facility]  Invega Sustena Inj due 6/11
Patient is a 64 y/o M with reported hx of schizophrenia sent in from [facility] for report of agitation.  Was agitated in [place] ED, and transferred to [facility] for inpatient psychiatric admission for safety and tx.  Adjusting very slowly to unit.  Speech is difficult to understand, and patient does not give a lot of information.  was initially started on Seroquel, changed to Haldol, then to Risperdal. More verbal on 4/8, increased Risperdal to 1mg bid.  on 4/9, increased Risperdal to 1qam, 2mg hs.  Improved sx. Began Invega Sustenna on 4/11. Combative behavior on 4/12, stat Haldol and Ativan IM with good results. Next dose of Invega Sustenna  due 4/16
Patient is a 64 y/o M with reported hx of schizophrenia sent in from [facility] for report of agitation.  Was agitated in [place] ED, and transferred to [facility] for inpatient psychiatric admission for safety and tx.  Adjusting very slowly to unit.  Speech is difficult to understand, and patient does not give a lot of information

## 2019-06-20 NOTE — PROGRESS NOTE BEHAVIORAL HEALTH - MUSCLE TONE / STRENGTH
Normal muscle tone/strength

## 2019-06-20 NOTE — PROGRESS NOTE BEHAVIORAL HEALTH - HYGIENE
Poor
Poor
Fair
Other/Poor
Poor
Other/Poor
Poor
Poor
Poor/Other
Poor
Poor/Other
Fair
Other/Poor
Poor
Poor/Other
Fair
Poor
Poor/Other
Poor
Other/Poor
Other/Poor
Poor
Poor/Other
Poor
Poor/Other
Other/Poor
Poor
Poor
Poor/Other
Poor
Poor/Other
Poor

## 2019-06-20 NOTE — PROGRESS NOTE BEHAVIORAL HEALTH - NSBHADMITIPOBS_PSY_A_CORE
Enhanced supervision
Constant observation
Enhanced supervision
Constant observation
Enhanced supervision
Constant observation
Enhanced supervision
Constant observation
Constant observation
Enhanced supervision
Constant observation
Enhanced supervision
Constant observation
Constant observation
Enhanced supervision
Constant observation
Constant observation
Enhanced supervision
Constant observation
Routine observation
Constant observation
Enhanced supervision
Enhanced supervision
Constant observation
Enhanced supervision
Constant observation
Enhanced supervision
Constant observation

## 2019-06-20 NOTE — PROGRESS NOTE BEHAVIORAL HEALTH - THOUGHT ASSOCIATIONS
Loose
Loose
Normal
Normal
Loose
Loose
Normal
Loose
Normal
Loose
Normal
Normal
Loose
Loose
Normal
Loose
Normal
Loose
Normal
Loose
Normal
Loose
Normal
Loose
Normal
Loose
Normal
Loose
Normal
Loose
Normal

## 2019-06-20 NOTE — PROGRESS NOTE BEHAVIORAL HEALTH - NS ED BHA MSE SPEECH RATE
Fast, difficult to interrupt
Fast, difficult to interrupt
Normal
Fast, difficult to interrupt
Normal
Normal
Fast, difficult to interrupt
Fast, difficult to interrupt
Normal
Fast, difficult to interrupt
Normal
Fast, difficult to interrupt
Normal
Fast, difficult to interrupt
Normal
Normal
Fast, difficult to interrupt
Normal
Slowed
Slowed
Normal
Fast, difficult to interrupt
Fast, difficult to interrupt
Normal
Fast, difficult to interrupt
Normal
Normal
Fast, difficult to interrupt
Normal
Other
Fast, difficult to interrupt
Normal
Slowed
Normal
Fast, difficult to interrupt

## 2019-06-20 NOTE — PROGRESS NOTE BEHAVIORAL HEALTH - ABNORMAL MOVEMENTS
No abnormal movements
Tremors/Other
No abnormal movements
Other
No abnormal movements
Tremors/Other
No abnormal movements

## 2019-06-20 NOTE — PROGRESS NOTE BEHAVIORAL HEALTH - NSBHLEGALSTATUS_PSY_A_CORE
9.27 (2PC)
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

## 2019-06-20 NOTE — PROGRESS NOTE BEHAVIORAL HEALTH - NS ED BHA REVIEW OF ED CHART VITAL SIGNS REVIEWED
Yes

## 2019-06-20 NOTE — PROGRESS NOTE BEHAVIORAL HEALTH - NSBHATTESTSEENBY_PSY_A_CORE
NP without Attending Psychiatrist
NP without Attending Psychiatrist
attending Psychiatrist without NP/Trainee
NP without Attending Psychiatrist
attending Psychiatrist without NP/Trainee
attending Psychiatrist without NP/Trainee
NP without Attending Psychiatrist
attending Psychiatrist without NP/Trainee
NP without Attending Psychiatrist
attending Psychiatrist without NP/Trainee
NP without Attending Psychiatrist
attending Psychiatrist without NP/Trainee
NP without Attending Psychiatrist
attending Psychiatrist without NP/Trainee
attending Psychiatrist without NP/Trainee
NP without Attending Psychiatrist
attending Psychiatrist without NP/Trainee
NP without Attending Psychiatrist
attending Psychiatrist without NP/Trainee
attending Psychiatrist without NP/Trainee
NP without Attending Psychiatrist
NP without Attending Psychiatrist
attending Psychiatrist without NP/Trainee
NP without Attending Psychiatrist
NP without Attending Psychiatrist
attending Psychiatrist without NP/Trainee
attending Psychiatrist without NP/Trainee
NP without Attending Psychiatrist

## 2019-06-20 NOTE — PROGRESS NOTE BEHAVIORAL HEALTH - NS ED BHA MSE GENERAL APPEARANCE
Other/Well developed
Well developed/Other
Other/Well developed
Well developed/Other
Other/Well developed
Other/Well developed
Well developed/Other
Other/Well developed
Well developed/Other
Other/Well developed
Well developed/Other
Well developed/Other
Other/Well developed
Well developed/Other
Other/Well developed
Other/Well developed
Well developed/Other
Well developed/Other
Other/Well developed
Well developed/Other
Well developed/Other
Other/Well developed
Well developed/Other
Other/Well developed
Well developed/Other
Well developed/Other
Other/Well developed
Other/Well developed
Well developed/Other
Other/Well developed
Well developed/Other
Other/Well developed

## 2019-06-20 NOTE — PROGRESS NOTE BEHAVIORAL HEALTH - NSBHCHARTREVIEWVS_PSY_A_CORE FT
Vital Signs Last 24 Hrs  T(C): 36.7 (17 Jun 2019 08:05), Max: 36.7 (17 Jun 2019 08:05)  T(F): 98 (17 Jun 2019 08:05), Max: 98 (17 Jun 2019 08:05)  HR: 87 (17 Jun 2019 08:05) (87 - 87)  BP: 126/79 (17 Jun 2019 08:05) (126/79 - 126/79)  BP(mean): --  RR: 16 (17 Jun 2019 08:05) (16 - 16)  SpO2: 96% (17 Jun 2019 08:05) (96% - 96%)
Vital Signs Last 24 Hrs  T(C): --  T(F): --  HR: 92 (10 May 2019 15:48) (92 - 92)  BP: 133/76 (10 May 2019 15:48) (133/76 - 133/76)  BP(mean): --  RR: --  SpO2: --
Vital Signs Last 24 Hrs  T(C): --  T(F): --  HR: 78 (18 May 2019 16:59) (76 - 78)  BP: 132/78 (18 May 2019 16:59) (132/78 - 135/78)  BP(mean): --  RR: 17 (18 May 2019 16:59) (17 - 17)  SpO2: --
Vital Signs Last 24 Hrs  T(C): 36.4 (10 Frank 2019 07:40), Max: 36.4 (10 Frank 2019 07:40)  T(F): 97.5 (10 Frank 2019 07:40), Max: 97.5 (10 Frank 2019 07:40)  HR: 103 (10 Frank 2019 16:11) (103 - 111)  BP: 117/71 (10 Frank 2019 16:11) (117/71 - 142/73)  BP(mean): --  RR: 20 (10 Frank 2019 16:11) (18 - 20)  SpO2: 94% (10 Frank 2019 16:11) (94% - 97%)
Vital Signs Last 24 Hrs  T(C): 36.5 (29 May 2019 08:32), Max: 36.5 (29 May 2019 08:32)  T(F): 97.7 (29 May 2019 08:32), Max: 97.7 (29 May 2019 08:32)  HR: 91 (29 May 2019 08:32) (91 - 91)  BP: 137/83 (29 May 2019 08:32) (137/83 - 137/83)  BP(mean): --  RR: 16 (29 May 2019 08:32) (16 - 16)  SpO2: 96% (29 May 2019 08:32) (96% - 96%)
Vital Signs Last 24 Hrs  T(C): 36.7 (23 May 2019 07:30), Max: 36.7 (23 May 2019 07:30)  T(F): 98 (23 May 2019 07:30), Max: 98 (23 May 2019 07:30)  HR: 99 (23 May 2019 07:30) (83 - 99)  BP: 143/80 (23 May 2019 16:10) (143/80 - 158/96)  BP(mean): --  RR: 17 (23 May 2019 16:10) (17 - 18)  SpO2: 96% (22 May 2019 17:30) (96% - 96%)
%Vital Signs Last 24 Hrs  T(C): 36.6 (19 Jun 2019 09:34), Max: 36.6 (19 Jun 2019 09:34)  T(F): 97.8 (19 Jun 2019 09:34), Max: 97.8 (19 Jun 2019 09:34)  HR: 81 (19 Jun 2019 09:34) (81 - 81)  BP: 146/76 (19 Jun 2019 09:34) (130/81 - 146/76)  BP(mean): --  RR: 17 (19 Jun 2019 09:34) (17 - 18)  SpO2: 97% (19 Jun 2019 09:34) (96% - 97%)
-
Vital Signs Last 24 Hrs  T(C): --  T(F): --  HR: --  BP: 147/89 (24 May 2019 08:35) (147/89 - 147/89)  BP(mean): --  RR: 16 (24 May 2019 08:35) (16 - 16)  SpO2: --
Vital Signs Last 24 Hrs  T(C): --  T(F): --  HR: 78 (13 Jun 2019 16:09) (78 - 78)  BP: 146/78 (13 Jun 2019 16:09) (146/78 - 146/78)  BP(mean): --  RR: --  SpO2: --
Vital Signs Last 24 Hrs  T(C): --  T(F): --  HR: 92 (10 May 2019 15:48) (92 - 92)  BP: 133/76 (10 May 2019 15:48) (133/76 - 133/76)  BP(mean): --  RR: --  SpO2: --
Vital Signs Last 24 Hrs  T(C): --  T(F): --  HR: 92 (10 May 2019 15:48) (92 - 92)  BP: 133/76 (10 May 2019 15:48) (133/76 - 133/76)  BP(mean): --  RR: --  SpO2: --
Vital Signs Last 24 Hrs  T(C): --  T(F): --  HR: 99 (27 May 2019 16:22) (99 - 99)  BP: 124/72 (27 May 2019 16:22) (124/72 - 124/72)  BP(mean): --  RR: 18 (27 May 2019 16:22) (18 - 18)  SpO2: --
Vital Signs Last 24 Hrs  T(C): 36.2 (21 May 2019 08:21), Max: 36.2 (21 May 2019 08:21)  T(F): 97.2 (21 May 2019 08:21), Max: 97.2 (21 May 2019 08:21)  HR: 75 (21 May 2019 16:15) (75 - 98)  BP: 126/99 (21 May 2019 16:15) (120/77 - 126/99)  BP(mean): --  RR: 18 (21 May 2019 16:15) (16 - 18)  SpO2: --
Vital Signs Last 24 Hrs  T(C): 36.4 (07 Jun 2019 08:32), Max: 36.4 (07 Jun 2019 08:13)  T(F): 97.6 (07 Jun 2019 08:32), Max: 97.6 (07 Jun 2019 08:13)  HR: 92 (07 Jun 2019 08:32) (79 - 92)  BP: 163/84 (07 Jun 2019 08:32) (115/64 - 163/84)  BP(mean): --  RR: 17 (07 Jun 2019 08:32) (17 - 18)  SpO2: 97% (06 Jun 2019 15:59) (97% - 97%)
Vital Signs Last 24 Hrs  T(C): 36.6 (19 Jun 2019 09:34), Max: 36.6 (19 Jun 2019 09:34)  T(F): 97.8 (19 Jun 2019 09:34), Max: 97.8 (19 Jun 2019 09:34)  HR: 79 (19 Jun 2019 16:32) (79 - 81)  BP: 161/82 (19 Jun 2019 16:32) (146/76 - 161/82)  BP(mean): --  RR: 18 (19 Jun 2019 16:32) (17 - 18)  SpO2: 97% (19 Jun 2019 09:34) (97% - 97%)
Vital Signs Last 24 Hrs  T(C): --  T(F): --  HR: 81 (18 Jun 2019 16:22) (81 - 81)  BP: 130/81 (18 Jun 2019 16:22) (130/81 - 130/81)  BP(mean): --  RR: 18 (18 Jun 2019 16:22) (18 - 18)  SpO2: 96% (18 Jun 2019 16:22) (96% - 96%)
Vital Signs Last 24 Hrs  T(C): 36.7 (17 May 2019 17:20), Max: 36.7 (17 May 2019 17:20)  T(F): 98 (17 May 2019 17:20), Max: 98 (17 May 2019 17:20)  HR: 80 (17 May 2019 17:20) (80 - 80)  BP: 131/84 (17 May 2019 17:20) (131/84 - 164/92)  BP(mean): --  RR: 16 (17 May 2019 17:20) (16 - 16)  SpO2: 96% (17 May 2019 15:49) (96% - 96%)
Vital Signs Last 24 Hrs  T(C): 36.4 (13 Jun 2019 08:04), Max: 36.4 (13 Jun 2019 08:04)  T(F): 97.6 (13 Jun 2019 08:04), Max: 97.6 (13 Jun 2019 08:04)  HR: 103 (13 Jun 2019 08:04) (103 - 103)  BP: 120/76 (13 Jun 2019 08:04) (120/76 - 120/76)  BP(mean): --  RR: 17 (13 Jun 2019 08:04) (17 - 18)  SpO2: ---
Vital Signs Last 24 Hrs  T(C): 36.2 (21 May 2019 08:21), Max: 36.2 (21 May 2019 08:21)  T(F): 97.2 (21 May 2019 08:21), Max: 97.2 (21 May 2019 08:21)  HR: 75 (21 May 2019 16:15) (75 - 98)  BP: 126/99 (21 May 2019 16:15) (120/77 - 126/99)  BP(mean): --  RR: 18 (21 May 2019 16:15) (16 - 18)  SpO2: --
Vital Signs Last 24 Hrs  T(C): --  T(F): --  HR: 93 (30 May 2019 15:57) (93 - 93)  BP: 143/88 (30 May 2019 15:57) (143/88 - 143/88)  BP(mean): --  RR: 17 (30 May 2019 15:57) (17 - 17)  SpO2: --
Vital Signs Last 24 Hrs  T(C): 36.3 (31 May 2019 09:06), Max: 36.3 (31 May 2019 09:06)  T(F): 97.4 (31 May 2019 09:06), Max: 97.4 (31 May 2019 09:06)  HR: 97 (31 May 2019 17:01) (92 - 97)  BP: 130/82 (31 May 2019 17:01) (130/82 - 135/88)  BP(mean): --  RR: 18 (31 May 2019 17:01) (18 - 18)  SpO2: 97% (31 May 2019 09:06) (97% - 97%)
Vital Signs Last 24 Hrs  T(C): 36.6 (02 Jun 2019 10:04), Max: 36.6 (02 Jun 2019 10:04)  T(F): 97.8 (02 Jun 2019 10:04), Max: 97.8 (02 Jun 2019 10:04)  HR: 81 (02 Jun 2019 15:47) (81 - 96)  BP: 122/69 (02 Jun 2019 15:47) (122/69 - 149/77)  BP(mean): --  RR: 16 (02 Jun 2019 15:47) (16 - 18)  SpO2: --
-
VVital Signs Last 24 Hrs  T(C): 36.5 (04 Jun 2019 08:39), Max: 36.5 (04 Jun 2019 08:39)  T(F): 97.7 (04 Jun 2019 08:39), Max: 97.7 (04 Jun 2019 08:39)  HR: 103 (04 Jun 2019 15:49) (103 - 115)  BP: 145/93 (04 Jun 2019 15:49) (121/79 - 145/93)  BP(mean): --  RR: 20 (04 Jun 2019 15:49) (18 - 20)  SpO2: 97% (04 Jun 2019 15:49) (97% - 97%)
Vital Signs Last 24 Hrs  T(C): --  T(F): --  HR: 78 (18 May 2019 16:59) (76 - 78)  BP: 132/78 (18 May 2019 16:59) (132/78 - 135/78)  BP(mean): --  RR: 17 (18 May 2019 16:59) (17 - 17)  SpO2: --
Vital Signs Last 24 Hrs  T(C): --  T(F): --  HR: 79 (06 Jun 2019 15:59) (79 - 79)  BP: 115/64 (06 Jun 2019 15:59) (115/64 - 115/64)  BP(mean): --  RR: 18 (06 Jun 2019 15:59) (18 - 18)  SpO2: 97% (06 Jun 2019 15:59) (97% - 97%)-
Vital Signs Last 24 Hrs  T(C): 36.7 (03 Jun 2019 09:05), Max: 36.7 (03 Jun 2019 09:05)  T(F): 98.1 (03 Jun 2019 09:05), Max: 98.1 (03 Jun 2019 09:05)  HR: 98 (03 Jun 2019 09:05) (81 - 98)  BP: 116/81 (03 Jun 2019 09:05) (116/81 - 122/69)  BP(mean): --  RR: 18 (03 Jun 2019 09:05) (16 - 18)  SpO2: --
Vital Signs Last 24 Hrs  T(C): 36.7 (11 Jun 2019 08:23), Max: 36.7 (11 Jun 2019 08:23)  T(F): 98 (11 Jun 2019 08:23), Max: 98 (11 Jun 2019 08:23)  HR: 102 (11 Jun 2019 08:23) (102 - 103)  BP: 130/83 (11 Jun 2019 08:23) (117/71 - 130/83)  BP(mean): --  RR: 16 (11 Jun 2019 08:23) (16 - 20)  SpO2: 94% (10 Frank 2019 16:11) (94% - 94%)
-
-
Vital Signs Last 24 Hrs  T(C): 36.4 (12 Jun 2019 08:07), Max: 36.4 (12 Jun 2019 08:07)  T(F): 97.6 (12 Jun 2019 08:07), Max: 97.6 (12 Jun 2019 08:07)  HR: 18 (12 Jun 2019 08:07) (18 - 98)  BP: 127/79 (11 Jun 2019 16:26) (127/79 - 127/79)  BP(mean): --  RR: 16 (11 Jun 2019 16:26) (16 - 16)  SpO2: --
Vital Signs Last 24 Hrs  T(C): 36.9 (05 Jun 2019 09:07), Max: 36.9 (05 Jun 2019 09:07)  T(F): 98.4 (05 Jun 2019 09:07), Max: 98.4 (05 Jun 2019 09:07)  HR: 93 (05 Jun 2019 09:07) (93 - 93)  BP: 132/73 (05 Jun 2019 09:07) (132/73 - 132/73)  BP(mean): --  RR: 14 (05 Jun 2019 09:07) (14 - 14)  SpO2: --

## 2019-06-20 NOTE — PROGRESS NOTE BEHAVIORAL HEALTH - NSBHADMITCOUNSEL_PSY_A_CORE
importance of adherence to chosen treatment

## 2019-06-20 NOTE — PROGRESS NOTE BEHAVIORAL HEALTH - AXIS III
HTN

## 2019-06-20 NOTE — PROGRESS NOTE BEHAVIORAL HEALTH - THOUGHT CONTENT
Delusions
Other
Delusions
Ideas of reference/Delusions
Delusions
Other/Delusions
Delusions
Delusions/Other
Delusions
Other/Delusions
Delusions

## 2019-06-20 NOTE — PROGRESS NOTE BEHAVIORAL HEALTH - NSBHTIMEBASEDBILLING_PSY_A_CORE
yes...
yes...
no
yes...
no
no
yes...
no
yes...
no
no
yes...
yes...
no
no
yes...
no
yes...
no
no
yes...
no
yes...

## 2019-06-20 NOTE — PROGRESS NOTE BEHAVIORAL HEALTH - NSBHFUPSUICINTERVAL_PSY_A_CORE
none known
unable to assess
none known
unable to assess
none known
none known
unable to assess

## 2019-06-20 NOTE — PROGRESS NOTE BEHAVIORAL HEALTH - NS ED BHA MED ROS ENDOCRINE
No complaints
Unable to assess
Unable to assess
No complaints
Unable to assess
No complaints

## 2019-06-20 NOTE — PROGRESS NOTE BEHAVIORAL HEALTH - REMOTE MEMORY
Impaired
Impaired
Normal
Impaired
Impaired
Other
Normal
Normal
Impaired
Normal
Impaired
Normal
Normal
Impaired
Normal
Normal
Impaired
Normal
Impaired
Normal
Impaired
Normal
Impaired
Normal
Impaired
Normal
Impaired
Normal
Normal
Impaired

## 2019-06-20 NOTE — PROGRESS NOTE BEHAVIORAL HEALTH - NSBHFUPVIOL_PSY_A_CORE
none known
yes
none known
unable to assess
none known
yes
yes
none known
yes
unable to assess
unable to assess

## 2019-06-20 NOTE — PROGRESS NOTE BEHAVIORAL HEALTH - NS ED BHA MSE SPEECH SPONTANEITY
Normal
Increased latency
Normal
Increased latency
Increased latency
Normal
Increased latency
Normal
Other
Increased latency
Normal

## 2019-06-20 NOTE — PROGRESS NOTE BEHAVIORAL HEALTH - AFFECT CONGRUENCE
Congruent
Congruent
Try Benadryl before bed.  If helpful call for hydroxyzine.  Benadryl dose is on the med sheet.  This may help prevent his night waking.   
Congruent
Other
Congruent
Not congruent
Congruent

## 2019-06-20 NOTE — PROGRESS NOTE BEHAVIORAL HEALTH - JUDGMENT (REGARDING EVERYDAY EVENTS)
Poor
Other
Poor
Other
Other
Poor

## 2019-06-20 NOTE — PROGRESS NOTE BEHAVIORAL HEALTH - GROOMING
Other/Fair
Poor
Fair
Fair
Poor
Poor
Other/Fair
Poor
Poor
Other/Fair
Poor
Fair/Other
Other/Poor
Poor
Fair
Fair
Fair/Other
Poor
Poor
Fair
Fair/Other
Other/Fair
Other/Poor
Poor
Poor/Other
Other/Fair
Poor
Other/Fair
Poor
Poor
Other/Fair
Poor
Poor
Fair
Poor
Poor
Other/Fair
Poor
Other/Poor
Poor
Poor/Other
Fair/Other
Poor
Fair/Other

## 2019-06-20 NOTE — PROGRESS NOTE BEHAVIORAL HEALTH - NSBHFUPINTERVALCCFT_PSY_A_CORE
" I feel better.  The medicine and riding the bike are helping me"
" I always eat a good dinner"
" I am fine"
" I am not on 1:1.  I am going around and around"
" I control all the electricity in the leslie and here"
" I need medicine for constipation."
" I need medicine for constipation."
" I need to ejaculate to behave.  The [employer] wants me to ejaculate so I can behave"
" I play that game (Connect Four) with staff"
" I push buttons and I change my mood"
" I showered and ate ok today"
" I want sugar and milk with my cornflakes"
" I want to go home"
" I want to kiss you!"
" I want you to call me a cab.  But the FBI cant know about it.  They are after me because I masturbate and ejaculate"
" I wanted my clothes"
" I was retired from [employer].  Now I am going back there."
" In bed and on 1:1 for disorganization"
" In bed, wants to get, sedated, with limited answers which seems incoherent ""
" Next time I will remember to bring my sunglasses.  I left them home, and it is almost summer"
" Patient was naked and in bed sleeping "
" The torpedoes are on the way to here.  They want to get us, but they won't"
" You people don't know what your are talking about.  I am the one running things here"
"Call me a cab.  A Cab is waiting.  I'm going to [place]"
"Get out of here."
"I don't have to go to [facility] "
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  Then I worked for [employer], and they stole my truck with everything in it"
"I want a cab now!"
"I want to go home, but the police ruined my life and put me in the hospital"
"I want to go home.  I am feeling ok"
"I want to make a phone call to my doctor and tell him to come and get me"
"The food here is garbage.  Just open the door for me so I can leave. The staff here is against me"
"What do you want from me."
I am fine. What do you want?
Patient is a 62 y/o M with reported hx of schizophrenia sent in from [facility] for report of agitation.  Was agitated in [facility] ED, and transferred to [facility] for inpatient psychiatric admission for safety and tx.
" I'm fine"
"I'm ok."
" I know the FBI is at my home.  I built that home  I want to go back there"
"I need to go on a walk."
"I had a nice birthday"
" I need a taxi to go home to the [place] house.  I built that house and I own it.  The FBI is trying to keep me out of it"
"I was given [place] by the [employer].  I own this whole country"
" You people are a bunch of idiots.  My leg is on my head"
" I am standing here, reading the sign"
" I built the home I lived in.  You are all my enemies, and the soldiers are coming soon to get you"
" I need a jar of mayonnaise to keep in my room, in case I want a sandwich for the police"
" I want to go home.  My daughter is there waiting for me, and she has a sandwich for me.  If I don't go home, the police will amarjit her"
" You people are good for nothing.  I was in the [employer].  I have 20 bullets in my leg"
" I ma doing fine today."
"I want to call a cab to come and get me"
" I want to go home"
"I feel ok"
" I want to go to the  office to get my check."
"I need to cut my toenail.
Pt has been asking for a taxi.
" The color guard tried to drown me in the ocean.  It is all because of you people here.  I want to go home"
"I can be a superhero"
" All you people here are dumbbells. Why can't I have a weekend pass?"
" I want to go home.  I want to go to court and talk to the "
"I need a phone number to call a cab to go home"
"I need a ride home.  What is happening with you?  I have shirts with pockets"
"I have to be careful who I talk to.  People are against me.  I can't tell you why.  No one can know"
"I don't need to be here. Get me out of here."
" I need birth control pills because I am always masturbating and ejaculating all over my clothes and I don't want to get pregnant"
" I want to go back to my home.  I built the house, and I was the  and the supervisor.  The FBI is preventing me from living there"
" I need a cab go to [facility].  I can live there.  That is where the FBI is staying.  Don't you love me? "
"I eat chicken salad here"
" Give me your white coat and I will wear it"
"I need more sleep"

## 2019-06-20 NOTE — PROGRESS NOTE BEHAVIORAL HEALTH - PRN MEDICATIONS SINCE LAST EVAL
yes
no
no
yes
yes
no
no
yes
no
no
yes
no
yes
no
yes
no
yes
no
yes
no
yes
yes
no
yes
no
no
yes
yes

## 2019-06-20 NOTE — PROGRESS NOTE BEHAVIORAL HEALTH - THOUGHT PROCESS
Disorganized/Illogical/Impaired reasoning
Disorganized/Impaired reasoning/Illogical
Illogical/Other/Impaired reasoning/Linear
Other
Disorganized/Illogical/Impaired reasoning
Illogical/Impaired reasoning/Linear/Other
Illogical/Impaired reasoning/Disorganized
Disorganized/Illogical/Impaired reasoning
Illogical/Impaired reasoning/Disorganized
Disorganized
Impaired reasoning/Disorganized/Illogical
Disorganized/Illogical/Impaired reasoning
Disorganized/Illogical/Impaired reasoning
Linear/Illogical/Impaired reasoning/Other
Impaired reasoning/Other/Linear
Other/Impaired reasoning/Linear
Disorganized/Illogical/Impaired reasoning
Disorganized/Illogical/Impaired reasoning
Illogical/Impaired reasoning/Disorganized
Illogical/Impaired reasoning/Disorganized
Impaired reasoning/Disorganized/Illogical
Impaired reasoning/Illogical/Disorganized
Linear/Other/Impaired reasoning
Impaired reasoning/Disorganized/Illogical
Illogical/Impaired reasoning/Disorganized
Illogical/Impaired reasoning/Disorganized
Impaired reasoning/Disorganized
Illogical/Impaired reasoning/Disorganized
Illogical/Impaired reasoning/Disorganized
Disorganized/Impaired reasoning/Illogical
Linear/Impaired reasoning/Other/Illogical
Other/Linear/Impaired reasoning
Impaired reasoning/Other
Illogical/Disorganized/Impaired reasoning
Disorganized/Illogical/Impaired reasoning
Disorganized/Impaired reasoning
Impaired reasoning/Illogical/Disorganized
Linear/Illogical/Impaired reasoning/Other
Linear/Other/Disorganized/Impaired reasoning
Other/Linear/Impaired reasoning
Disorganized/Impaired reasoning/Illogical
Illogical/Impaired reasoning/Disorganized
Illogical/Impaired reasoning/Disorganized
Impaired reasoning/Disorganized
Impaired reasoning/Other/Linear
Linear/Impaired reasoning/Other
Linear/Impaired reasoning/Other/Illogical
Other/Linear/Impaired reasoning
Disorganized/Illogical/Impaired reasoning
Disorganized/Illogical/Impaired reasoning
Illogical/Impaired reasoning/Disorganized
Illogical/Impaired reasoning/Disorganized
Impaired reasoning/Disorganized/Illogical
Impaired reasoning/Illogical/Other/Linear
Linear/Illogical/Impaired reasoning/Other
Linear/Illogical/Other/Impaired reasoning
Disorganized/Illogical/Impaired reasoning
Impaired reasoning/Other/Disorganized
Illogical/Impaired reasoning/Disorganized
Linear/Impaired reasoning/Other
Disorganized/Impaired reasoning
Disorganized/Impaired reasoning/Illogical
Illogical/Impaired reasoning/Disorganized
Other/Impaired reasoning
Disorganized/Illogical/Impaired reasoning
Other/Impaired reasoning/Linear
Other/Impaired reasoning/Linear
Linear/Other/Impaired reasoning
Illogical/Disorganized/Impaired reasoning
Other/Linear/Impaired reasoning

## 2019-06-20 NOTE — PROGRESS NOTE BEHAVIORAL HEALTH - NS ED BHA MSE SPEECH ARTICULATION
Impaired/Other
Normal/Other
Other/Normal
Other/Normal
Normal/Other
Impaired
Other/Normal
Normal/Other
Other/Normal
Impaired/Other
Other/Impaired
Normal/Other
Normal/Other
Other/Impaired
Normal/Other
Other/Normal
Normal/Other
Other/Normal
Other/Normal
Normal/Other
Impaired/Other
Other/Normal
Other/Impaired
Impaired/Other
Other/Normal
Impaired/Other
Impaired/Other
Other/Impaired
Other/Normal
Impaired/Other
Normal/Other
Normal/Other
Other/Impaired
Other/Normal
Impaired/Other
Normal/Other
Other/Impaired
Other/Normal
Other/Impaired
Normal/Other
Impaired/Other
Other/Normal
Normal/Other
Normal/Other
Other/Normal
Impaired/Other
Impaired/Other
Normal/Other
Impaired/Other
Impaired/Other
Other/Impaired
Normal/Other
Impaired/Other
Impaired/Other
Other
Impaired/Other
Normal/Other

## 2019-06-20 NOTE — PROGRESS NOTE BEHAVIORAL HEALTH - NS ED BHA MED ROS ALLERGIC IMMUNOLOGIC
No complaints
Yes
Yes
No complaints
Yes
No complaints

## 2019-06-20 NOTE — PROGRESS NOTE BEHAVIORAL HEALTH - NSBHADMITIPDSM_PSY_A_CORE
see above for Axis I, II, III

## 2019-06-20 NOTE — PROGRESS NOTE BEHAVIORAL HEALTH - NSBHADMITCOORDWITH_PSY_A_CORE
discussed with Dr. Moreira/social work
discussed with Dr. Moreira/social work
social work/discussed with Dr. Moreira
discussed with Dr. Moreira
social work/discussed with Dr. Moreira
discussed with Dr. Moreira/social work
discussed with Dr. Moreira/social work
social work/discussed with Dr. Moreira
social work/discussed with Dr. Moreira
discussed with Dr. Moreira/other
social work/discussed with Dr. Moreira
discussed with Dr. Moreira/social work
discussed with Dr. Moreira
discussed with Dr. Moreira/social work
discussed with Dr. Moreira/social work
social work/discussed with Dr. Moreira
discussed with Dr. Moreira/social work
discussed with Dr. Moreira/social work
social work/discussed with Dr. Davila
social work/discussed with Dr. Moreira
discussed with Dr. Moreira/social work
discussed with Dr. Moreira/social work
social work/discussed with Dr. Moreira
social work/discussed with Dr. Moreira
discussed with Dr. Moreira/social work
discussed with Dr. Moreira/social work
discussed with Dr. Moreira and Dr. Roberson/social work/medical staff
discussed with Dr. Moreira/social work
medical staff/social work/discussed with Dr. Moreira and Dr. Roberson
discussed with Dr. Moreira/social work
social work/discussed with Dr. Moreira
social work/medical staff/discussed with Dr. Moreira and Dr. Roberson
discussed with Dr. Moreira/social work
social work/discussed with Dr. Davila/other
social work/discussed with Dr. Moreira
discussed with Dr. Moreira/social work
social work/discussed with Dr. Moreira
discussed with Dr. Moreira/social work
social work/discussed with Dr. Davila
social work/discussed with Dr. Moreira
social work/discussed with Dr. Moreira
discussed with Dr. Moreira/social work
social work/discussed with Dr. Moreira
discussed with Dr. Moreira/social work
discussed with Dr. Moreira/medical staff/social work

## 2019-06-20 NOTE — PROGRESS NOTE BEHAVIORAL HEALTH - ORIENTED TO SITUATION
No
Other
No
Other
No
Yes
No
Other
No

## 2019-06-20 NOTE — PROGRESS NOTE BEHAVIORAL HEALTH - NSBHLOC_PSY_A_CORE
Alert
Lethargic, arousable to verbal stimulus
Alert

## 2019-06-20 NOTE — PROGRESS NOTE BEHAVIORAL HEALTH - NS ED BHA AXIS I PRIMARY CODE FT
F20.5
F20.9
F20.5
F20.9
F20.5
F20.9
F20.9
F20.5
F20.5
F20.9
F20.9
F20.5
F20.9
F20.9

## 2019-06-20 NOTE — PROGRESS NOTE BEHAVIORAL HEALTH - NSBHFUPTYPE_PSY_A_CORE
Consult Follow Up
Consult Follow Up
Inpatient
Inpatient-On Service Note
Consult Follow Up
Inpatient

## 2019-06-20 NOTE — PROGRESS NOTE BEHAVIORAL HEALTH - NS ED BHA MED ROS RESPIRATORY
No complaints
Unable to assess
Unable to assess
No complaints
Unable to assess
No complaints

## 2019-06-20 NOTE — PROGRESS NOTE BEHAVIORAL HEALTH - GAIT / STATION
Normal gait / station
Other
Other
Normal gait / station
Other
Normal gait / station
Other
Normal gait / station
Other
Normal gait / station
Other
Normal gait / station

## 2019-06-20 NOTE — PROGRESS NOTE BEHAVIORAL HEALTH - NSBHADMITMEDEDUDETAILS_A_CORE FT
Psychoeducation again  given re: need to take Rx as ordered with patient  answering, by saying ok.   Erickson Ivory due on 6/11
PPD read  6/2/2019 at 18:30 - negative   Patient teaching done re: need for Rx adherence for sx management with patient saying ok.  Erickson Wilson due 7/9
Patient teaching  done re:  starting cogentin for tremors with patient saying ok.   Next Invega Sustenna dose due 5/14
PPD read today 6/2/2019 at 18:30 - negative   Patient teaching done re: need for Rx adherence for sx management with patient saying ok.  Erickson Wilson due 6/11
Psychoeducation again  provided re: need for Rx adherence for sx management with patient saying ok.  Reports he is ok with having Invega Sustenna shot today.
PPD read  6/2/2019 at 18:30 - negative   Patient teaching done re: need for Rx adherence for sx management with patient saying ok.  Erickson Wilson due 6/11
Patient teaching done re: need to take Rx as ordered with patient saying "ok"
PPD read  6/2/2019 at 18:30 - negative   Patient teaching done re: need for Rx adherence for sx management with patient saying ok.  Erickson Wilson due 7/9
Patient teaching again done re: need to take Rx as ordered with patient saying "ok" but does not always take po Rx.  Next Invega Sustenna dose due 5/14
Psychoeducation again  given re: need to take Rx as ordered with patient  answering, by saying ok.   Erickson Ivory due on 6/11
PPD read  6/2/2019 at 18:30 - negative   Patient teaching done re: need for Rx adherence for sx management with patient saying ok.  Erickson Wilson due 7/9
PPD read  6/2/2019 at 18:30 - negative   Patient teaching done re: need for Rx adherence for sx management with patient saying ok.  Erickson Ivory due 7/2
PPD read  6/2/2019 at 18:30 - negative   Patient teaching done re: need for Rx adherence for sx management with patient saying ok.  Erickson Wilson due 7/9
PPD read today 6/2/2019 at 18:30 - negative   Patient teaching done re: need for Rx adherence for sx management with patient saying ok.  Erickson Wilson due 6/11
At this time I concur with the plan of transfer to a long-term psychiatric facility.
Patient teaching again done re: need to take Rx as ordered with patient saying "ok"  Next Invega Sustenna dose due 5/14
Psychoeducation again done re: need for Rx compliance with patient saying ok
Psychoeducation again  given re: need to take Rx as ordered with patient not answering.  Erickson Ivory due on 6/11
PPD read  6/2/2019 at 18:30 - negative   Patient teaching done re: need for Rx adherence for sx management with patient saying ok.  Erickson Wilson due 6/11
Psychoeducation done re: need to talk to staff when upset rather than acting out with patient not answering.
PPD read  6/2/2019 at 18:30 - negative   Patient teaching done re: need for Rx adherence for sx management with patient saying ok.  Erickson Ivory due 7/2
Psychoeducation again  given re: need to take Rx as ordered with patient  answering, by saying ok.   Erickson Ivory due on 6/11
Psychoeducation   given re: need to take Rx as ordered with patient not answering.  Invkarina Ivory due on 5/14
Patient teaching again done re: need to take Rx as ordered with patient saying "ok"
importance to speak up about issues
importance to speak up about issues
Patient teaching again done re: need to take Rx as ordered with patient saying "ok"  Next Invega Sustenna dose due 5/14
Patient teaching  done re: need for Rx adherence for sx management with patient saying ok.   Next Invega Sustenna dose due 5/14
Psychoeducation again  given re: need to take Rx as ordered with patient  answering, by saying ok.   Erickson Ivory due on 6/11
PPD read today 6/2/2019 at 18:30 - negative
Patient teaching  done re: need for Rx adherence for sx management with patient saying ok.   Next Invega Sustenna dose due 5/14
Psychoeducation again  given re: need to take Rx as ordered with patient  answering, by cursing and threatening  Invega Linaenna due on 6/11
Psychoeducation again  given re: need to take Rx as ordered with patient  answering, by saying ok.   Erickson Ivory due on 6/11
Psychoeducation again  given re: need to take Rx as ordered with patient  answering, by saying ok.   Erickson Ivory due on 6/11
Patient teaching  done re: need for Rx adherence for sx management with patient saying ok.   Next Invega Sustenna dose due 5/14
Psychoeducation again  given re: need to take Rx as ordered with patient  answering, by saying ok.   Erickson Ivory due on 6/11
Psychoeducation again  given re: need to take Rx as ordered with patient  answering, by saying ok.   Erickson Ivory due on 6/11
Psychoeducation again  given re: need to take Rx as ordered with patient not answering.  Erickson Ivory due on 6/11
At this time I concur with the plan of transfer to a long-term psychiatric facility.
At this time I concur with the plan of transfer to a long-term psychiatric facility.
PPD read  6/2/2019 at 18:30 - negative   Patient teaching done re: need for Rx adherence for sx management with patient saying ok.  Erickson Wilson due 6/11
PPD read  6/2/2019 at 18:30 - negative   Patient teaching done re: need for Rx adherence for sx management with patient saying ok.  Erickson Wilson due 6/11
Psychoeducation again  given re: need to take Rx as ordered with patient  answering, by saying ok.   Erickson Ivory due on 6/11
Psychoeducation provided re: need for Rx adherence for sx management with patient not answering
PPD read  6/2/2019 at 18:30 - negative   Patient teaching done again re: need for Rx adherence for sx management with patient saying ok.  Erickson Ivory due 7/2
Patient teaching again done re: need to take Rx as ordered with patient saying "ok" but does not always take po Rx.  Next Invega Sustenna dose due 5/14
Patient teaching again done re: need to take Rx as ordered with patient saying "ok"  Next Invega Sustenna dose due 5/14
Patient teaching again done re: need to take Rx as ordered with patient saying "ok"  Next Invega Sustenna dose due 5/14
Patient teaching again done re: need to take Rx as ordered with patient saying "ok". Teaching done re: Atropine gtts SL to help decrease his excessive salivation with patient saying ok.   Next Invega Sustenna dose due 5/14
Patient teaching done re: need to take Rx as ordered with patient saying "ok"
Psychoeducation again done re: need for Rx compliance with patient saying ok
Psychoeducation again done re: need for Rx compliance with patient saying ok
Patient teaching again done re: need to take Rx as ordered with patient saying "ok"  Next Invega Sustenna dose due 5/14
Patient teaching again done re: need to take Rx as ordered with patient saying "ok" but does not always take po Rx.  Next Invega Sustenna dose due 5/14
Patient teaching done re: need to take Rx as ordered with patient saying "ok"
PPD read  6/2/2019 at 18:30 - negative   Patient teaching done re: need for Rx adherence for sx management with patient saying ok.  Erickson Ivory due 7/2
Patient teaching  done re: need for Rx adherence for sx management with patient saying ok.   Next Invega Sustenna dose due 5/14
Psychoeducation done re: need for Rx compliance with patient saying ok
Patient teaching again done re: need to take Rx as ordered with patient saying "ok"  Next Invega Sustenna dose due 5/14
Psychoeducation provided re: Starting prn haldol and Ativan  with patient not answering

## 2021-05-18 ENCOUNTER — OUTPATIENT (OUTPATIENT)
Dept: OUTPATIENT SERVICES | Facility: HOSPITAL | Age: 66
LOS: 1 days | End: 2021-05-18
Payer: MEDICARE

## 2021-05-18 DIAGNOSIS — Z20.9 CONTACT WITH AND (SUSPECTED) EXPOSURE TO UNSPECIFIED COMMUNICABLE DISEASE: ICD-10-CM

## 2021-05-18 PROCEDURE — 0225U NFCT DS DNA&RNA 21 SARSCOV2: CPT

## 2022-01-26 NOTE — ED ADULT NURSE NOTE - NS PRO PASSIVE SMOKE EXP
Unknown Oxybutynin Pregnancy And Lactation Text: This medication is Pregnancy Category B and is considered safe during pregnancy. It is unknown if it is excreted in breast milk.

## 2022-04-26 NOTE — ED BEHAVIORAL HEALTH ASSESSMENT NOTE - OTHER PAST PSYCHIATRIC HISTORY (INCLUDE DETAILS REGARDING ONSET, COURSE OF ILLNESS, INPATIENT/OUTPATIENT TREATMENT)
Attending Only
reportedly schizophrenia.   Staff unable to provided family or past psychiatric history.

## 2024-01-01 NOTE — ED ADULT NURSE REASSESSMENT NOTE - NS ED NURSE REASSESS COMMENT FT1
upon assessment of patient, pt found ambulating unsteadily in front of stretcher, mumbling speech, unable to redirect, becoming combative with staff. deescalation techniques attempted with increase in agitation and combative behaviors. dr guevara called to bedside. will order ativan. 24.9

## 2024-09-27 NOTE — ED ADULT NURSE NOTE - TEMPLATE
Mr. Tristan was called today regarding his participation in (IRB #[id number] PI: Leah).   The Verbal Informed Consent was read and discussed by the consenter. The following was discussed:  Types of specimens to be collected  All medical information released to researchers will be stripped of identifiers and no patient information will be given to anyone outside of this research project.   Participating in a research study is not the same as getting regular medical care and will not improve the patient's health. The purpose of a research study is to gather information.  Being in this study does not interfere with your regular medical care.  The patient does not have to participate in this study. If they do not join, their care at [facility] will not be affected.  The person granting permission was provided adequate time to ask questions regarding the scope and purpose of the study.  Permission was obtained by telephone.   The above statements were read by the person obtaining permission to the person granting permission and witnessed by Hermilo Alvarez. The witness information was documented on the verbal consent form as well.  This Verbal Informed Consent process was conducted prior to initiation of any study procedures.      Psych/Behavioral